# Patient Record
Sex: MALE | Race: OTHER | HISPANIC OR LATINO | Employment: FULL TIME | URBAN - METROPOLITAN AREA
[De-identification: names, ages, dates, MRNs, and addresses within clinical notes are randomized per-mention and may not be internally consistent; named-entity substitution may affect disease eponyms.]

---

## 2017-02-01 ENCOUNTER — TELEPHONE (OUTPATIENT)
Dept: SLEEP MEDICINE | Facility: CLINIC | Age: 52
End: 2017-02-01

## 2017-02-10 ENCOUNTER — OFFICE VISIT (OUTPATIENT)
Dept: SLEEP MEDICINE | Facility: CLINIC | Age: 52
End: 2017-02-10
Payer: COMMERCIAL

## 2017-02-10 VITALS
HEIGHT: 72 IN | DIASTOLIC BLOOD PRESSURE: 67 MMHG | HEART RATE: 82 BPM | BODY MASS INDEX: 23.41 KG/M2 | SYSTOLIC BLOOD PRESSURE: 124 MMHG | WEIGHT: 172.81 LBS

## 2017-02-10 DIAGNOSIS — G47.30 SLEEP APNEA, UNSPECIFIED TYPE: Primary | ICD-10-CM

## 2017-02-10 DIAGNOSIS — M26.19 RETROGNATHIA: ICD-10-CM

## 2017-02-10 PROCEDURE — 99204 OFFICE O/P NEW MOD 45 MIN: CPT | Mod: S$GLB,,, | Performed by: PSYCHIATRY & NEUROLOGY

## 2017-02-10 PROCEDURE — 99999 PR PBB SHADOW E&M-EST. PATIENT-LVL III: CPT | Mod: PBBFAC,,, | Performed by: PSYCHIATRY & NEUROLOGY

## 2017-02-10 NOTE — PATIENT INSTRUCTIONS
Patient is candidate for home sleep testing.  Set up testing with [facility].  Scheduled by [facility]    1. Eliza[facility] will contact you to schedule your sleep study [phone] (ext [phone])  2. Sleep Lab is located in the [facility], take [place] elevator to 7th floor; You will see sign for [facility]

## 2017-02-10 NOTE — LETTER
February 10, 2017      Dinesh Meza MD  [address] [facility] [address]           [facility]  [address]  [facility] [address]  Phone: [phone]          Patient: Arpan Gamboa   MR Number: [id number]   YOB: 1965   Date of Visit: 2/10/2017       Dear Dr. Dinesh Meza:    Thank you for referring Arpan Gamboa to me for evaluation. Attached you will find relevant portions of my assessment and plan of care.    If you have questions, please do not hesitate to call me. I look forward to following Arpan Gamboa along with you.    Sincerely,    Jesse Agudelo MD    Enclosure  CC:  No Recipients    If you would like to receive this communication electronically, please contact [email or web address] or [phone] to request more information on [facility] access.    For providers and/or their staff who would like to refer a patient to [facility], please contact us through our one-stop-shop provider referral line, [facility], at [phone].    If you feel you have received this communication in error or would no longer like to receive these types of communications, please e-mail [email or web address]

## 2017-02-10 NOTE — MR AVS SNAPSHOT
[facility]  [address]  [facility] [address]  Phone: [phone]                  Arpan Gamboa   2/10/2017 1:00 PM   Office Visit    Description:  Male : 1965   Provider:  Jesse Agudelo MD   Department:  [facility]           Reason for Visit     Sleep Apnea           Diagnoses this Visit        Comments    Sleep apnea, unspecified type    -  Primary     Retrognathia                To Do List           Goals (5 Years of Data)     None      Follow-Up and Disposition     Return after sleep study.      [facility]     [facility] On Call Nurse Care Line -  Assistance  Registered nurses in the [facility] Call Center provide clinical advisement, health education, appointment booking, and other advisory services.  Call for this free service at [phone].             Medications           Message regarding Medications     Verify the changes and/or additions to your medication regime listed below are the same as discussed with your clinician today.  If any of these changes or additions are incorrect, please notify your healthcare provider.             Verify that the below list of medications is an accurate representation of the medications you are currently taking.  If none reported, the list may be blank. If incorrect, please contact your healthcare provider. Carry this list with you in case of emergency.                Clinical Reference Information           Your Vitals Were     BP Pulse Height Weight BMI    124/67 82 6' (1.829 m) 78.4 kg (172 lb 13.5 oz) 23.44 kg/m2      Blood Pressure          Most Recent Value    BP  124/67      Allergies as of 2/10/2017     Dog Hair Standardized Allergenic Extract      Immunizations Administered on Date of Encounter - 2/10/2017     None      Orders Placed During Today's Visit     Future Labs/Procedures Expected by Expires    Home Sleep Studies  As directed 2/10/2018      [facility]     Activating your  [facility] account is as easy as 1-2-3!     1) Visit [email or web address], select Sign Up Now, enter this activation code and your date of birth, then select Next.  [email or web address]  Expires: 3/27/2017  1:32 PM      2) Create a username and password to use when you visit [facility] in the future and select a security question in case you lose your password and select Next.    3) Enter your e-mail address and click Sign Up!    Additional Information  If you have questions, please e-mail [email or web address] or call [phone] to talk to our [facility] staff. Remember, [facility] is NOT to be used for urgent needs. For medical emergencies, dial 911.         Instructions    Patient is candidate for home sleep testing.  Set up testing with [facility].  Scheduled by [facility]    1. Fawad will contact you to schedule your sleep study [phone] (ext [phone])  2. Sleep Lab is located in the [facility], take [place] elevator to 7th floor; You will see sign for [facility]         Language Assistance Services     ATTENTION: Language assistance services are available, free of charge. Please call [phone].      ATENCIÓN: Si habla español, tiene a connell disposición servicios gratuitos de asistencia lingüística. Llame al [phone].     CHÚ Ý: N?u b?n nói Ti?ng Vi?t, có các d?ch v? h? tr? ngôn ng? mi?n phí dành cho b?n. G?i s? [phone].         [facility] complies with applicable Federal civil rights laws and does not discriminate on the basis of race, color, national origin, age, disability, or sex.

## 2017-02-10 NOTE — PROGRESS NOTES
This 51 y.o. male patient presents for the evaluation of possible obstructive sleep apnea.    Snoring way too much, getting worse  Gasping for air in sleep, at times, self-perceived  Apnea witnessed by girlfriend  Excessive daytime sleepiness/ Waking up tired    ESS = 9    Mother had prior sleep study, and sleep apnea (presumably), then had corrective surgery    Frequent stuffy nose and respiratory allergies    Kulkarni-Parkinson White syndrome    SLEEP ROUTINE:   Bed partner: girlfriend   Time to bed: 11 pm - [place]   Sleep onset latency: no time   Disruptions or awakenings: 1-2 times   Wakeup time: 6-7 am   Perceived sleep quality: poor to fair   Daytime naps: none      Past Medical History   Diagnosis Date    Asthma        History reviewed. No pertinent past surgical history.    Family History   Problem Relation Age of Onset    Lupus Sister        Social History   Substance Use Topics    Smoking status: Never Smoker    Smokeless tobacco: None    Alcohol use None       ALLERGIES: Reviewed in EPIC    CURRENT MEDICATIONS: Reviewed in EPIC    REVIEW OF SYSTEMS:  Sleep related symptoms as per HPI;    Denies weight gain;    Denies sinus problems;    Sometimes dyspnea;    Sometimes palpitations;    Sometimes acid reflux;    Denies polyuria;    Sometimes headaches;    Denies mood disturbance;    Denies anemia;    Otherwise, a balance of systems reviewed is negative.    PHYSICAL EXAM:  Visit Vitals    /67    Pulse 82    Ht 6' (1.829 m)    Wt 78.4 kg (172 lb 13.5 oz)    BMI 23.44 kg/m2     GENERAL: Well groomed; Normally developed;  HEENT: Conjunctivae are non-erythematous; Pupils equal, round, and reactive to light;    Nose is symmetrical; Nasal mucosa is pink and moist; Septum is midline;    Turbinates are normal; Nasal airflow is normal;    Posterior pharynx is pink; Posterior palate is normal; Modified Mallampati: I   Uvula is normal; Tongue is normal; Tonsils +1;    Dentition is fair; No TMJ tenderness;     Mild-mod retrognathia   Jaw opening and protrusion without click and without discomfort.  NECK: Supple. No thyromegaly. No palpable nodes. Neck circumference in inches is 15.6  SKIN: On face and neck: No abrasions, no rashes, no lesions.     No subcutaneous nodules are palpable.  RESPIRATORY: Chest is clear to auscultation.     Normal chest expansion and non-labored breathing at rest.  CARDIOVASCULAR: Normal S1, S2.  No murmurs, gallops or rubs.   No carotid bruits bilaterally.  EXTREMITIES: No clubbing. No cyanosis. Edema is absent.   NEURO: Oriented to time, place and person.    Normal attention span and concentration.  Station normal. Gait normal.  PSYCH: Affect is full. Mood is normal.      ASSESSMENT:    1. Sleep Apnea, unspecified. The patient symptomatically has snoring and witnessed apnea and non-refreshing sleep with exam findings of a crowded oral airway with medical co-morbidities of moderate retrognathia. This warrants further investigation for possible obstructive sleep apnea.    2. Retrognathia - likely a component of increasing sleep apnea risk. May be a good oral appliance candidate, if obstructive sleep apnea is determined.         PLAN:    Diagnostic: Home sleep study. The nature of this procedure and its indication was discussed with the patient.    Education: During our discussion today, we talked about the etiology of obstructive sleep apnea as well as the potential ramifications of untreated sleep apnea, which could include daytime sleepiness, hypertension, heart disease and/or stroke.   Precautions: The patient was advised to abstain from driving should they feel sleepy or drowsy.    Follow up: I will see the patient back after the sleep study has been completed. At this time, we can review the data and discuss potential treatment options. MD/NP

## 2017-03-06 ENCOUNTER — TELEPHONE (OUTPATIENT)
Dept: SLEEP MEDICINE | Facility: OTHER | Age: 52
End: 2017-03-06

## 2017-03-10 ENCOUNTER — TELEPHONE (OUTPATIENT)
Dept: SLEEP MEDICINE | Facility: OTHER | Age: 52
End: 2017-03-10

## 2017-04-05 ENCOUNTER — TELEPHONE (OUTPATIENT)
Dept: SLEEP MEDICINE | Facility: OTHER | Age: 52
End: 2017-04-05

## 2017-04-06 ENCOUNTER — HOSPITAL ENCOUNTER (OUTPATIENT)
Dept: SLEEP MEDICINE | Facility: OTHER | Age: 52
Discharge: HOME | End: 2017-04-06
Attending: PSYCHIATRY & NEUROLOGY
Payer: COMMERCIAL

## 2017-04-06 DIAGNOSIS — G47.33 OSA (OBSTRUCTIVE SLEEP APNEA): ICD-10-CM

## 2017-04-06 DIAGNOSIS — G47.30 SLEEP APNEA, UNSPECIFIED TYPE: ICD-10-CM

## 2017-04-06 PROCEDURE — 95800 SLP STDY UNATTENDED: CPT

## 2017-04-06 PROCEDURE — 95800 SLP STDY UNATTENDED: CPT | Mod: 26,,, | Performed by: PSYCHIATRY & NEUROLOGY

## 2017-04-16 ENCOUNTER — TELEPHONE (OUTPATIENT)
Dept: SLEEP MEDICINE | Facility: OTHER | Age: 52
End: 2017-04-16

## 2017-04-21 ENCOUNTER — OFFICE VISIT (OUTPATIENT)
Dept: SLEEP MEDICINE | Facility: CLINIC | Age: 52
End: 2017-04-21
Payer: COMMERCIAL

## 2017-04-21 VITALS
BODY MASS INDEX: 23.11 KG/M2 | DIASTOLIC BLOOD PRESSURE: 71 MMHG | WEIGHT: 170.63 LBS | HEART RATE: 75 BPM | SYSTOLIC BLOOD PRESSURE: 119 MMHG | HEIGHT: 72 IN

## 2017-04-21 DIAGNOSIS — G47.33 OBSTRUCTIVE SLEEP APNEA: Primary | ICD-10-CM

## 2017-04-21 PROCEDURE — 1160F RVW MEDS BY RX/DR IN RCRD: CPT | Mod: S$GLB,,, | Performed by: NURSE PRACTITIONER

## 2017-04-21 PROCEDURE — 99214 OFFICE O/P EST MOD 30 MIN: CPT | Mod: S$GLB,,, | Performed by: NURSE PRACTITIONER

## 2017-04-21 PROCEDURE — 99999 PR PBB SHADOW E&M-EST. PATIENT-LVL III: CPT | Mod: PBBFAC,,, | Performed by: NURSE PRACTITIONER

## 2017-04-21 RX ORDER — TOBRAMYCIN AND DEXAMETHASONE 3; 1 MG/ML; MG/ML
SUSPENSION/ DROPS OPHTHALMIC
Refills: 1 | COMMUNITY
Start: 2017-03-20 | End: 2017-06-26

## 2017-04-21 NOTE — MR AVS SNAPSHOT
[facility]  [address]  [facility] [address]  Phone: [phone]                  Arpan Gamboa   2017 3:40 PM   Office Visit    Description:  Male : 1965   Provider:  Donna Major NP   Department:  [facility]           Reason for Visit     Snoring     Sleep Apnea           Diagnoses this Visit        Comments    Obstructive sleep apnea    -  Primary            To Do List           Goals (5 Years of Data)     None      [facility]     [facility] On Call Nurse Care Line -  Assistance  Unless otherwise directed by your provider, please contact [facility], our nurse care line that is available for  assistance.     Registered nurses in the [facility] On Call Center provide: appointment scheduling, clinical advisement, health education, and other advisory services.  Call: [phone] (toll free)               Medications           Message regarding Medications     Verify the changes and/or additions to your medication regime listed below are the same as discussed with your clinician today.  If any of these changes or additions are incorrect, please notify your healthcare provider.             Verify that the below list of medications is an accurate representation of the medications you are currently taking.  If none reported, the list may be blank. If incorrect, please contact your healthcare provider. Carry this list with you in case of emergency.           Current Medications     tobramycin-dexamethasone 0.3-0.1% (TOBRADEX) 0.3-0.1 % DrpS INSTILL 1 DROP INTO BOTH EYES 4 TIMES A DAY           Clinical Reference Information           Your Vitals Were     BP Pulse Height Weight BMI    119/71 75 6' (1.829 m) 77.4 kg (170 lb 10.2 oz) 23.14 kg/m2      Blood Pressure          Most Recent Value    BP  119/71      Allergies as of 2017     Dog Hair Standardized Allergenic Extract      Immunizations Administered on Date of Encounter -  4/21/2017     None      [facility] Sign-Up     Activating your [facility] account is as easy as 1-2-3!     1) Visit [email or web address], select Sign Up Now, enter this activation code and your date of birth, then select Next.  [email or web address]  Expires: 6/5/2017  4:23 PM      2) Create a username and password to use when you visit [facility] in the future and select a security question in case you lose your password and select Next.    3) Enter your e-mail address and click Sign Up!    Additional Information  If you have questions, please e-mail [email or web address] or call [phone] to talk to our [facility] staff. Remember, [facility] is NOT to be used for urgent needs. For medical emergencies, dial 911.         Instructions      Obstructive Sleep Apnea  Obstructive sleep apnea is a condition that causes your air passages to become narrowed or blocked during sleep. As a result, breathing stops for short periods. Your body wakes up enough for breathing to begin again, though you don't remember it. The cycle of stopped breathing and brief awakenings can repeat dozens of times a night. This prevents the body from getting to the deeper stages of sleep that are needed for good rest.  Signs of sleep apnea include loud snoring, noisy breathing, and gasping sounds during sleep. Daytime symptoms include waking up tired after a full night's sleep, waking up with headaches, feeling very sleepy or falling asleep during the day, and having problems with memory or concentration.  Risk factors for sleep apnea include:  · Being overweight  · Being a man, or a woman in menopause  · Smoking  · Using alcohol or sedating medications or herbs  · Having enlarged structures in the nose or throat  Home care  Lifestyle changes that can help treat snoring and sleep apnea include the following:  · If you are overweight, lose weight. Talk to your healthcare provider about a weight-loss plan for you.  · Avoid alcohol for 3 to 4 hours before  bedtime. Avoid sedating medications. Ask your healthcare provider about the medications you take.  · If you smoke, talk to your healthcare provider about ways to quit.  · Sleep on your side. This can help prevent gravity from pulling relaxed throat tissues into your breathing passages.  · If you have allergies or sinus problems that block your nose, ask your healthcare provider for help.  Follow up  Follow up with your healthcare provider as advised. A diagnosis of sleep apnea is made with a sleep study. Your healthcare provider can tell you more about this test.  When to seek medical care  Sleep apnea can make you more likely to have certain health problems. These include high blood pressure, heart attack, stroke, and sexual dysfunction. If you have sleep apnea, talk to your healthcare provider about the best treatments for you.  Date Last Reviewed: 5/3/2015  © 6327-1753 [employer]. [address] [place] [address]. All rights reserved. This information is not intended as a substitute for professional medical care. Always follow your healthcare professional's instructions.        Continuous Positive Air Pressure (CPAP)  Continuous positive air pressure (CPAP) uses gentle air pressure to hold the airway open. CPAP is often the most effective treatment for sleep apnea and severe snoring. It works very well for many people. But keep in mind that it can take several adjustments before the setup is right for you.    How CPAP works  The CPAPmachine  is a small portable pump beside the bed. The pump sends air through a hose, which is held over your nose and mouth by a mask. Mild air pressure is gently pushed through your airway. The air pressure nudges sagging tissues aside. This widens the airway so you can breathe better. CPAP may be combined with other kinds of therapy for sleep apnea.     A mask over the nose gently directs air into the throat to keep the airway open.   Types of air pressure  treatments  There are different types of CPAP. Your doctor or CPAP technician will help you decide which type is best for you:  · Basic CPAP keeps the pressure constant all night long.  · A bilevel device (BiPAP) provides more pressure when you breathe in and less when you breathe out. A BiPAP machine also may be set to provide automatic breaths to maintain breathing if you stop breathing while sleeping.  · An autoCPAP device automatically adjusts pressure throughout the night and in response to changes such as body position, sleep stage, and snoring.  Date Last Reviewed: 8/10/2015  © 6082-0247 [employer]. [address] [place] [address]. All rights reserved. This information is not intended as a substitute for professional medical care. Always follow your healthcare professional's instructions.        Mouthpieces for Sleep Apnea  For simple snoring or mild to moderate apnea, a special mouthpiece may help. A dental specialist works with your healthcare provider to build and fit a mouthpiece just for you. A follow-up sleep study checks how well the device is working for you. Mouthpieces are also called oral appliances.     Moving the jaw and tongue forward with a mouthpiece can open the airway to reduce sleep apnea.   Moving the jaw forward  Most mouthpieces move the jaw and tongue forward. That keeps the tongue from blocking the airway. Mouthpieces can work well, but they are not for everyone. Work with your healthcare provider to get a mouthpiece that fits just right for you. Oral appliances are usually custom made for you by a dental professional. And, avoid over-the-counter mouthpieces -- they often do not work.  Tips  To have the most success with your mouthpiece, keep these tips in mind:  · It will take some time to get used to wearing a mouthpiece. At first it may feel uncomfortable or make your mouth water. If these problems last, tell your healthcare provider.  · Expect several  rounds of adjustments to get the mouthpiece to fit and work just right for you.  · Mouthpieces dont cure the problems that cause snoring or sleep apnea. So you need to use your mouthpiece all night, every night.  · Follow your healthcare providers instructions for keeping the mouthpiece clean.  · When youre not wearing your mouthpiece, store it in its case.  Date Last Reviewed: 8/9/2015 © 2000-2016 [employer]. [address] [place] [address], [place] [address]. All rights reserved. This information is not intended as a substitute for professional medical care. Always follow your healthcare professional's instructions.             Language Assistance Services     ATTENTION: Language assistance services are available, free of charge. Please call [phone].      ATENCIÓN: Si paul eladio, tiene a connell disposición servicios gratuitos de asistencia lingüística. Llame al [phone].     CHÚ Ý: N?u b?n nói Ti?ng Vi?t, có các d?ch v? h? tr? ngôn ng? mi?n phí dành cho b?n. G?i s? [phone].         [facility] complies with applicable Federal civil rights laws and does not discriminate on the basis of race, color, national origin, age, disability, or sex.

## 2017-04-21 NOTE — PROGRESS NOTES
This 51 y.o. male patient returns today for the mgt of obstructive sleep apnea. Last seen by MD Agudelo 2/10/17, this is my initial visit with him.     Since then, he has undergone a home sleep study which was reviewed with him today. +snoring, + air gasping +witnessed apneas, + waking up tired/EDS, + disrupted sleep. Less loud snoring on his side. Tries to stay on his side. Nocturia now 3-4x! ESS=12. Occasional sinus congestion (dust allergy). Prn use flonase ns and allegra.     HST 4/7/17: AHI 35 (supine ahi 52/side ahi 7/low sat 86%        HISTORY:  2/10/17:  Snoring way too much, getting worse  Gasping for air in sleep, at times, self-perceived  Apnea witnessed by girlfriend  Excessive daytime sleepiness/ Waking up tired    ESS = 9    Mother had prior sleep study, and sleep apnea (presumably), then had corrective surgery    Frequent stuffy nose and respiratory allergies    Kulkarni-Parkinson White syndrome    SLEEP ROUTINE:   Bed partner: girlfriend   Time to bed: 11 pm - [place]   Sleep onset latency: no time   Disruptions or awakenings: 1-2 times   Wakeup time: 6-7 am   Perceived sleep quality: poor to fair   Daytime naps: none    REVIEW OF SYSTEMS:  Sleep related symptoms as per HPI; 2# loss, otherwise a balance review of 10-systems is negative.     PHYSICAL EXAM:  /71  Pulse 75  Ht 6' (1.829 m)  Wt 77.4 kg (170 lb 10.2 oz)  BMI 23.14 kg/m2  GENERAL: Well groomed; W/D, W/N   Mild-mod retrognathia     ASSESSMENT:    1. AMANDEEP, severe, supine worse. He has medical co-morbidities of moderate retrognathia. This warrants treatment for obstructive sleep apnea.           PLAN:  We discussed potential treatment options, which could include weight loss (10-15%), body positioning (showed position devices, side ahi mild and less snoring), continuous positive airway pressure (CPAP-defintive), mandibular advancement splint by dentist if intolerable to trial of PAP therapy, or referral for surgical consideration. Discussed  etiology of AMANDEEP and potential ramifications of untreated AMANDEEP, including heart disease, hypertension, cognitive difficulties, stroke, and diabetes. AHI>15 associated with increased cardiovascular risk.     The patient should avoid sleeping supine; the non-supine RDI is 5.7 times less severe than the supine RDI.    Do not drive sleepy.     He will consider options and notify me next week

## 2017-04-21 NOTE — PATIENT INSTRUCTIONS
Obstructive Sleep Apnea  Obstructive sleep apnea is a condition that causes your air passages to become narrowed or blocked during sleep. As a result, breathing stops for short periods. Your body wakes up enough for breathing to begin again, though you don't remember it. The cycle of stopped breathing and brief awakenings can repeat dozens of times a night. This prevents the body from getting to the deeper stages of sleep that are needed for good rest.  Signs of sleep apnea include loud snoring, noisy breathing, and gasping sounds during sleep. Daytime symptoms include waking up tired after a full night's sleep, waking up with headaches, feeling very sleepy or falling asleep during the day, and having problems with memory or concentration.  Risk factors for sleep apnea include:  · Being overweight  · Being a man, or a woman in menopause  · Smoking  · Using alcohol or sedating medications or herbs  · Having enlarged structures in the nose or throat  Home care  Lifestyle changes that can help treat snoring and sleep apnea include the following:  · If you are overweight, lose weight. Talk to your healthcare provider about a weight-loss plan for you.  · Avoid alcohol for 3 to 4 hours before bedtime. Avoid sedating medications. Ask your healthcare provider about the medications you take.  · If you smoke, talk to your healthcare provider about ways to quit.  · Sleep on your side. This can help prevent gravity from pulling relaxed throat tissues into your breathing passages.  · If you have allergies or sinus problems that block your nose, ask your healthcare provider for help.  Follow up  Follow up with your healthcare provider as advised. A diagnosis of sleep apnea is made with a sleep study. Your healthcare provider can tell you more about this test.  When to seek medical care  Sleep apnea can make you more likely to have certain health problems. These include high blood pressure, heart attack, stroke, and sexual  dysfunction. If you have sleep apnea, talk to your healthcare provider about the best treatments for you.  Date Last Reviewed: 5/3/2015  © 3600-4838 20:[employer]. [address] [place] [address]. All rights reserved. This information is not intended as a substitute for professional medical care. Always follow your healthcare professional's instructions.        Continuous Positive Air Pressure (CPAP)  Continuous positive air pressure (CPAP) uses gentle air pressure to hold the airway open. CPAP is often the most effective treatment for sleep apnea and severe snoring. It works very well for many people. But keep in mind that it can take several adjustments before the setup is right for you.    How CPAP works  The CPAPmachine  is a small portable pump beside the bed. The pump sends air through a hose, which is held over your nose and mouth by a mask. Mild air pressure is gently pushed through your airway. The air pressure nudges sagging tissues aside. This widens the airway so you can breathe better. CPAP may be combined with other kinds of therapy for sleep apnea.     A mask over the nose gently directs air into the throat to keep the airway open.   Types of air pressure treatments  There are different types of CPAP. Your doctor or CPAP technician will help you decide which type is best for you:  · Basic CPAP keeps the pressure constant all night long.  · A bilevel device (BiPAP) provides more pressure when you breathe in and less when you breathe out. A BiPAP machine also may be set to provide automatic breaths to maintain breathing if you stop breathing while sleeping.  · An autoCPAP device automatically adjusts pressure throughout the night and in response to changes such as body position, sleep stage, and snoring.  Date Last Reviewed: 8/10/2015  © 7359-1837 20:20 [employer]. [address] [place] [address]. All rights reserved. This information is not intended as a  substitute for professional medical care. Always follow your healthcare professional's instructions.        Mouthpieces for Sleep Apnea  For simple snoring or mild to moderate apnea, a special mouthpiece may help. A dental specialist works with your healthcare provider to build and fit a mouthpiece just for you. A follow-up sleep study checks how well the device is working for you. Mouthpieces are also called oral appliances.     Moving the jaw and tongue forward with a mouthpiece can open the airway to reduce sleep apnea.   Moving the jaw forward  Most mouthpieces move the jaw and tongue forward. That keeps the tongue from blocking the airway. Mouthpieces can work well, but they are not for everyone. Work with your healthcare provider to get a mouthpiece that fits just right for you. Oral appliances are usually custom made for you by a dental professional. And, avoid over-the-counter mouthpieces -- they often do not work.  Tips  To have the most success with your mouthpiece, keep these tips in mind:  · It will take some time to get used to wearing a mouthpiece. At first it may feel uncomfortable or make your mouth water. If these problems last, tell your healthcare provider.  · Expect several rounds of adjustments to get the mouthpiece to fit and work just right for you.  · Mouthpieces dont cure the problems that cause snoring or sleep apnea. So you need to use your mouthpiece all night, every night.  · Follow your healthcare providers instructions for keeping the mouthpiece clean.  · When youre not wearing your mouthpiece, store it in its case.  Date Last Reviewed: 8/9/2015  © 7682-1282 [employer]. [address] [place] [address], [place] [address]. All rights reserved. This information is not intended as a substitute for professional medical care. Always follow your healthcare professional's instructions.

## 2017-05-08 PROBLEM — I45.6 WOLFF-PARKINSON-WHITE SYNDROME: Status: ACTIVE | Noted: 2017-05-08

## 2017-05-17 ENCOUNTER — OFFICE VISIT (OUTPATIENT)
Dept: SLEEP MEDICINE | Facility: CLINIC | Age: 52
End: 2017-05-17
Payer: COMMERCIAL

## 2017-05-17 VITALS
SYSTOLIC BLOOD PRESSURE: 120 MMHG | BODY MASS INDEX: 22.99 KG/M2 | HEART RATE: 80 BPM | HEIGHT: 72 IN | WEIGHT: 169.75 LBS | DIASTOLIC BLOOD PRESSURE: 64 MMHG

## 2017-05-17 DIAGNOSIS — G47.33 OBSTRUCTIVE SLEEP APNEA: Primary | ICD-10-CM

## 2017-05-17 PROCEDURE — 99499 UNLISTED E&M SERVICE: CPT | Mod: S$GLB,,, | Performed by: NURSE PRACTITIONER

## 2017-05-17 PROCEDURE — 99999 PR PBB SHADOW E&M-EST. PATIENT-LVL III: CPT | Mod: PBBFAC,,, | Performed by: NURSE PRACTITIONER

## 2017-06-26 ENCOUNTER — OFFICE VISIT (OUTPATIENT)
Dept: UROLOGY | Facility: CLINIC | Age: 52
End: 2017-06-26
Payer: COMMERCIAL

## 2017-06-26 VITALS
BODY MASS INDEX: 23.57 KG/M2 | SYSTOLIC BLOOD PRESSURE: 127 MMHG | HEIGHT: 72 IN | WEIGHT: 174 LBS | HEART RATE: 96 BPM | DIASTOLIC BLOOD PRESSURE: 71 MMHG

## 2017-06-26 DIAGNOSIS — R35.1 NOCTURIA: ICD-10-CM

## 2017-06-26 DIAGNOSIS — N40.1 BENIGN NON-NODULAR PROSTATIC HYPERPLASIA WITH LOWER URINARY TRACT SYMPTOMS: Primary | ICD-10-CM

## 2017-06-26 DIAGNOSIS — N20.0 KIDNEY STONE: ICD-10-CM

## 2017-06-26 PROCEDURE — 99204 OFFICE O/P NEW MOD 45 MIN: CPT | Mod: S$GLB,,, | Performed by: UROLOGY

## 2017-06-26 PROCEDURE — 99999 PR PBB SHADOW E&M-EST. PATIENT-LVL III: CPT | Mod: PBBFAC,,, | Performed by: UROLOGY

## 2017-06-26 NOTE — PROGRESS NOTES
Subjective:      Patient ID: Arpan Gamboa is a 51 y.o. male.    Chief Complaint: No chief complaint on file.    Patient is a 51 y.o. male who is new to our clinic and referred by their PCP, Dr. Mckee for evaluation of BPH.     HPI  Benign Prostatic Hypertrophy  Patient complains of lower urinary tract symptoms. He reports frequency, nocturia four times a night and urgency. He denies incomplete emptying, intermittency, straining and weak stream. Patient states symptoms are of moderate severity. Onset of symptoms was 4 months ago and was sudden in onset. His AUA Symptom Score is, 18/3 manifested as irritative symptoms including frequency, urgency, nocturia. He has no personal history but a questionable family history of prostate cancer with his father who is undergoing biopsies currently. He reports a history of kidney stones. He denies flank pain, gross hematuria and recurrent UTI.    He denies any prostate medications.   He drinks 1-2 cups/day. Goes to bed at midnight.  Eats dinner at 8-9pm.  Drinks water or a glass of wine with dinner.  Does not drink fluid between dinner and bedtime.   He has AMANDEEP and uses CPAP--started 1 month ago, compliant 7days/week.      Review of Systems   All other systems reviewed and negative except pertinent positives noted in HPI.    Objective:     Physical Exam   Nursing note and vitals reviewed.  Constitutional: He is oriented to person, place, and time. Vital signs are normal. He appears well-developed and well-nourished. He is cooperative.   HENT:   Head: Normocephalic.   Neck: No tracheal deviation present.   Cardiovascular: Normal rate and intact distal pulses.    Pulmonary/Chest: Effort normal. No accessory muscle usage. No tachypnea. No respiratory distress.   Abdominal: Soft. Normal appearance. He exhibits no distension, no fluid wave, no ascites and no mass. There is no tenderness. There is no rebound and no CVA tenderness. No hernia. Hernia confirmed negative in the  right inguinal area and confirmed negative in the left inguinal area.   Liver/spleen non-palpable.   Genitourinary: Prostate normal, testes normal and penis normal. Rectal exam shows no external hemorrhoid, no mass, no tenderness and anal tone normal. Prostate is not tender. Right testis shows no mass and no tenderness. Right testis is descended. Left testis shows no mass and no tenderness. Left testis is descended. Uncircumcised.   Genitourinary Comments: Scrotum showed no rashes or lesions.  Anus/perineum without lesion.  Epididymis showed no masses or tenderness. No meatal stenosis, meatus in normal location. No penile discharge/plaques. Prostate smooth, no nodules, 30 grams.  Seminal vesicles non-palpable.  Normal sphincter tone.        Musculoskeletal: Normal range of motion.   Lymphadenopathy: No inguinal adenopathy noted on the right or left side.        Right: No inguinal adenopathy present.        Left: No inguinal adenopathy present.   Neurological: He is alert and oriented to person, place, and time. He has normal strength.   Skin: No bruising and no rash noted.     Psychiatric: He has a normal mood and affect. His speech is normal and behavior is normal. Thought content normal.        Assessment:     1. Benign non-nodular prostatic hyperplasia with lower urinary tract symptoms    2. Nocturia    3. Kidney stone      Plan:     1. BPH:  -A post void residual bladder scan was performed immediately after voiding. The patient's PVR was noted to be 47mL.  -Avoid bladder irritants including but not limited to caffeine, alcohol, smoking, spicy foods, acidic foods, tomato-based products, citrus, artificial sweeteners, chocolate, coffee or tea.  -prostate meds: none; offered flomax.  We will hold off for right now.       2. Nocturia:  -Limit fluids 2 hours before bedtime.  Elevated legs 2 hours before bedtime.  No caffeine, cokes, teas after 3 pm in the afternoon.    3. Kidney stones:  -CT RSS to assess stone  burden.

## 2017-06-30 ENCOUNTER — HOSPITAL ENCOUNTER (OUTPATIENT)
Dept: RADIOLOGY | Facility: HOSPITAL | Age: 52
Discharge: HOME | End: 2017-06-30
Attending: UROLOGY
Payer: COMMERCIAL

## 2017-06-30 DIAGNOSIS — N20.0 KIDNEY STONE: ICD-10-CM

## 2017-06-30 PROCEDURE — 74176 CT ABD & PELVIS W/O CONTRAST: CPT | Mod: 26,,, | Performed by: RADIOLOGY

## 2017-06-30 PROCEDURE — 74176 CT ABD & PELVIS W/O CONTRAST: CPT | Mod: TC

## 2017-07-11 ENCOUNTER — TELEPHONE (OUTPATIENT)
Dept: CARDIOLOGY | Facility: CLINIC | Age: 52
End: 2017-07-11

## 2017-07-24 ENCOUNTER — OFFICE VISIT (OUTPATIENT)
Dept: SLEEP MEDICINE | Facility: CLINIC | Age: 52
End: 2017-07-24
Payer: COMMERCIAL

## 2017-07-24 VITALS
BODY MASS INDEX: 23.71 KG/M2 | DIASTOLIC BLOOD PRESSURE: 70 MMHG | WEIGHT: 175.06 LBS | HEART RATE: 64 BPM | SYSTOLIC BLOOD PRESSURE: 140 MMHG | HEIGHT: 72 IN

## 2017-07-24 DIAGNOSIS — G47.33 OBSTRUCTIVE SLEEP APNEA: Primary | ICD-10-CM

## 2017-07-24 PROCEDURE — 99999 PR PBB SHADOW E&M-EST. PATIENT-LVL III: CPT | Mod: PBBFAC,,, | Performed by: NURSE PRACTITIONER

## 2017-07-24 PROCEDURE — 99213 OFFICE O/P EST LOW 20 MIN: CPT | Mod: S$GLB,,, | Performed by: NURSE PRACTITIONER

## 2017-07-24 NOTE — PROGRESS NOTES
This 51 y.o. male patient returns today for the mgt of obstructive sleep apnea. Last seen 4/2/17    He has since been setup with apap (6/10/17) 4-16cm. Hated it initially but since acclimated, he now likes it and can use it. Traveling with equipment. Feels more mentally sharp/clearer thinking. Snoring minimal. Much less disrupted sleep, now once. ESS=9. Denies nasal drying despite low heat setting (adds water though).Denies pressure intolernace. Using chin strap with dreamwear mask. Occasional mask leaks.     Interrogation- AHI 7.6-9.6, 0% periodic. avg use 5.1h/n. 23/30d>4h. 86-95 (1d)% leak (was blowing in hotel room recently while not in use).           HISTORY:  2/10/17:  Snoring way too much, getting worse  Gasping for air in sleep, at times, self-perceived  Apnea witnessed by girlfriend  Excessive daytime sleepiness/ Waking up tired    ESS = 9    Mother had prior sleep study, and sleep apnea (presumably), then had corrective surgery    Frequent stuffy nose and respiratory allergies    Kulkarni-Parkinson White syndrome    SLEEP ROUTINE:   Bed partner: girlfriend   Time to bed: 11 pm - [place]   Sleep onset latency: no time   Disruptions or awakenings: 1-2 times   Wakeup time: 6-7 am   Perceived sleep quality: poor to fair   Daytime naps: none    4/21/17:   Since then, he has undergone a home sleep study which was reviewed with him today. +snoring, + air gasping +witnessed apneas, + waking up tired/EDS, + disrupted sleep. Less loud snoring on his side. Tries to stay on his side. Nocturia now 3-4x! ESS=12. Occasional sinus congestion (dust allergy). Prn use flonase ns and allegra.     HST 4/7/17: AHI 35 (supine ahi 52/side ahi 7/low sat 86%      REVIEW OF SYSTEMS:  Sleep related symptoms as per HPI;5# gain, otherwise a balance review of 10-systems is negative.     PHYSICAL EXAM:  BP (!) 140/70   Pulse 64   Ht 6' (1.829 m)   Wt 79.4 kg (175 lb 0.7 oz)   BMI 23.74 kg/m²   GENERAL: Well groomed; W/D, W/N   Mild-mod  retrognathia     ASSESSMENT:    1. AMANDEEP, severe, supine worse. 7/24/17: Adherent with apap, benefiting from therapy. Residual elevated mild ahi could be from leak or pressure adjustment.Symptoms improved  He has medical co-morbidities of moderate retrognathia. This warrants treatment for obstructive sleep apnea.           PLAN:  Continue APAP, ajdust today 7-14cm. Continue to improve mask on time/sleep time. Avoid mask leaks    Discussed etiology of AMANDEEP and potential ramifications of untreated AMANDEEP, including heart disease, hypertension, cognitive difficulties, stroke, and diabetes. AHI>15 associated with increased cardiovascular risk.     RTC 5 wk adherence

## 2017-07-31 DIAGNOSIS — I45.6 WPW (WOLFF-PARKINSON-WHITE SYNDROME): Primary | ICD-10-CM

## 2017-08-29 ENCOUNTER — TELEPHONE (OUTPATIENT)
Dept: ELECTROPHYSIOLOGY | Facility: CLINIC | Age: 52
End: 2017-08-29

## 2017-08-29 NOTE — TELEPHONE ENCOUNTER
----- Message from Ata Hines MD sent at 8/25/2017  4:56 PM CDT -----  Tx Conor  He canceled his appointment on 8/2 for some scheduling conflict  We will reach out to him for rescheduling  Best  Ata  ----- Message -----  From: Conor Garcia MD  Sent: 5/8/2017   2:41 PM  To: MD Dr. Ata Wei,    Advised to see you for WPW.    Please see my enclosed office note.    Conor Garcia M.D.

## 2017-08-29 NOTE — TELEPHONE ENCOUNTER
Pt scheduled for consult apt on 9/28. Called pt to offer sooner apt, no answer. Left voicemail requesting pt call back to clinic if he prefers to be seen sooner by Dr Valerio.

## 2018-05-04 ENCOUNTER — OFFICE VISIT (OUTPATIENT)
Dept: CARDIOLOGY | Facility: CLINIC | Age: 53
End: 2018-05-04
Payer: COMMERCIAL

## 2018-05-04 ENCOUNTER — CLINICAL SUPPORT (OUTPATIENT)
Dept: CARDIOLOGY | Facility: CLINIC | Age: 53
End: 2018-05-04
Payer: COMMERCIAL

## 2018-05-04 VITALS
DIASTOLIC BLOOD PRESSURE: 70 MMHG | WEIGHT: 182 LBS | HEIGHT: 72 IN | BODY MASS INDEX: 24.65 KG/M2 | SYSTOLIC BLOOD PRESSURE: 124 MMHG | HEART RATE: 72 BPM

## 2018-05-04 DIAGNOSIS — I45.6 WPW (WOLFF-PARKINSON-WHITE SYNDROME): ICD-10-CM

## 2018-05-04 DIAGNOSIS — R00.2 PALPITATIONS: ICD-10-CM

## 2018-05-04 DIAGNOSIS — I47.10 PSVT (PAROXYSMAL SUPRAVENTRICULAR TACHYCARDIA): ICD-10-CM

## 2018-05-04 DIAGNOSIS — G47.33 OBSTRUCTIVE SLEEP APNEA SYNDROME: ICD-10-CM

## 2018-05-04 DIAGNOSIS — I45.6 WOLFF-PARKINSON-WHITE SYNDROME: Primary | ICD-10-CM

## 2018-05-04 PROCEDURE — 93000 ELECTROCARDIOGRAM COMPLETE: CPT | Mod: S$GLB,,, | Performed by: INTERNAL MEDICINE

## 2018-05-04 PROCEDURE — 99999 PR PBB SHADOW E&M-EST. PATIENT-LVL II: CPT | Mod: PBBFAC,,, | Performed by: INTERNAL MEDICINE

## 2018-05-04 PROCEDURE — 99245 OFF/OP CONSLTJ NEW/EST HI 55: CPT | Mod: S$GLB,,, | Performed by: INTERNAL MEDICINE

## 2018-05-04 RX ORDER — ALBUTEROL SULFATE 90 UG/1
2 AEROSOL, METERED RESPIRATORY (INHALATION) DAILY PRN
COMMUNITY
Start: 2017-12-12 | End: 2019-09-08 | Stop reason: SDUPTHER

## 2018-05-04 RX ORDER — OXYBUTYNIN CHLORIDE 10 MG/1
10 TABLET, EXTENDED RELEASE ORAL DAILY
COMMUNITY
End: 2020-06-09

## 2018-05-04 NOTE — PROGRESS NOTES
Subjective:   Patient ID:  Arpan Gamboa is a 52 y.o. male     Chief complaint:Irregular Heart Beat (WPW since teenager)      HPI  Background:  Arpan Gamboa is a 52 y.o. male from [facility].   Initially seen by dr Garcia.   Referred to me last year. Canceled several appts in [place].   He was initially diagnosed with WPW at age 17 when in [facility]. This was after an SVT episode that started while playing basket ball and it lasted for 2 hrs. He was given propranolol (once a day) but had frequent tachycardias.He stopped the med after a couple years. He then resumed exercising w/o issues.    Between 1987 and 2006 he essentially did not have any spells. Since then however, he has been having essentially yearly spellls of tachycardias.   In 2007 at age 42, he was admitted to a hospital in [place] with a 4 hour long tachycardia and then had a heart rate of 230-240 bpm.   More recently, he has been having a bit more issues. He has had 2 attacks the past year, the last of which was last month.  On average, each episode lasts 10-20 minutes. Nothing aggravates the symptoms.   Episodes occur always as he is going to bed.   He has never been offered an ablation. He was told about vagal maneuvers and he uses that effectively at times (cold bucket)  Pertinent negatives include no anxiety, chest fullness, chest pain, coughing, diaphoresis, dizziness, fever, irregular heartbeat, malaise/fatigue, nausea, near-syncope, numbness, shortness of breath, syncope, vomiting or weakness. He has tried beta blockers for the symptoms.   He does not exercise as much due to knee injury (during a half marathon).  Travels a lot. Works for [employer].   Has sleep apnea and uses CPAP-- loves it.   I have reviewed the actual image of the ECG tracing obtained today and it shows NSR with possible pre-excitation and . If there is true preexcitation, the delta wave axis may be c/w an AS BT.          Current Outpatient Prescriptions    Medication Sig    oxybutynin (DITROPAN XL) 10 MG 24 hr tablet Take 10 mg by mouth once daily.     No current facility-administered medications for this visit.      Review of Systems   Constitution: Negative for decreased appetite, weakness, malaise/fatigue, weight gain and weight loss.   Eyes: Negative for blurred vision.   Cardiovascular: Positive for irregular heartbeat and palpitations. Negative for chest pain, claudication, cyanosis, dyspnea on exertion, leg swelling, near-syncope and orthopnea.   Respiratory: Positive for snoring. Negative for cough, shortness of breath, sleep disturbances due to breathing and wheezing.    Endocrine: Negative for heat intolerance.   Hematologic/Lymphatic: Does not bruise/bleed easily.   Musculoskeletal: Negative for muscle weakness and myalgias.   Gastrointestinal: Negative for melena, nausea and vomiting.   Genitourinary: Positive for frequency. Negative for nocturia.   Neurological: Negative for excessive daytime sleepiness, dizziness, headaches and light-headedness.   Psychiatric/Behavioral: Negative for depression, memory loss and substance abuse. The patient does not have insomnia and is not nervous/anxious.        Objective:   Physical Exam   Constitutional: He is oriented to person, place, and time. He appears well-developed and well-nourished.   HENT:   Head: Normocephalic and atraumatic.   Right Ear: External ear normal.   Left Ear: External ear normal.   Eyes: Conjunctivae are normal. Pupils are equal, round, and reactive to light. Right eye exhibits no discharge. Left eye exhibits no discharge. Right conjunctiva is not injected. Left conjunctiva has no hemorrhage.   Neck: Neck supple. No JVD present. No thyromegaly present.   Cardiovascular: Normal rate, regular rhythm, normal heart sounds and intact distal pulses.  PMI is not displaced.  Exam reveals no gallop, no friction rub, no midsystolic click and no opening snap.    No murmur heard.  Pulses:       Carotid  pulses are 2+ on the right side, and 2+ on the left side.       Radial pulses are 2+ on the right side, and 2+ on the left side.        Dorsalis pedis pulses are 2+ on the right side, and 2+ on the left side.        Posterior tibial pulses are 2+ on the right side, and 2+ on the left side.   Pulmonary/Chest: Effort normal and breath sounds normal. No respiratory distress. He has no wheezes. He has no rales. He exhibits no tenderness.   Abdominal: Soft. Normal appearance. He exhibits no pulsatile liver. There is no hepatomegaly. There is no tenderness. There is no rebound and no guarding.   Musculoskeletal: Normal range of motion. He exhibits no edema or tenderness.        Right knee: He exhibits no swelling.        Left knee: He exhibits no swelling.        Right ankle: He exhibits no swelling.        Left ankle: He exhibits no swelling.        Right lower leg: He exhibits no swelling.        Left lower leg: He exhibits no swelling.        Right foot: There is no swelling.        Left foot: There is no swelling.   Neurological: He is alert and oriented to person, place, and time. He has normal strength and normal reflexes. No cranial nerve deficit. Coordination normal.   Skin: Skin is warm, dry and intact. No rash noted. He is not diaphoretic. No cyanosis.   Psychiatric: He has a normal mood and affect. His behavior is normal.   Nursing note and vitals reviewed.    /70 (BP Location: Right arm, Patient Position: Sitting)   Pulse 72   Ht 6' (1.829 m)   Wt 82.6 kg (182 lb)   BMI 24.68 kg/m²      Assessment:      1. Sebastián-Parkinson-White syndrome    2. Palpitations    3. PSVT (paroxysmal supraventricular tachycardia)    4. Obstructive sleep apnea syndrome        Plan:    Discussed options -- he would like to proceed with RFA  But in a few weeks or so -- he'll discuss with his wife first  I have discussed the procedure in detail with the patient. I described its benefits and risks. I reviewed alternative  therapies and discussed their potential value. The patient was given ample opportunity to express concerns and ask questions and I provided appropriate responses and  answers to such.The patient understands and agrees to proceed.  Consent form was signed today by patient and myself and appropriately witnessed.     No orders of the defined types were placed in this encounter.    Follow-up in about 6 months (around 11/4/2018), or post op.  There are no discontinued medications.  New Prescriptions    No medications on file     Modified Medications    No medications on file

## 2018-06-13 ENCOUNTER — OFFICE VISIT (OUTPATIENT)
Dept: SPORTS MEDICINE | Facility: CLINIC | Age: 53
End: 2018-06-13
Payer: COMMERCIAL

## 2018-06-13 ENCOUNTER — HOSPITAL ENCOUNTER (OUTPATIENT)
Dept: RADIOLOGY | Facility: HOSPITAL | Age: 53
Discharge: HOME | End: 2018-06-13
Attending: FAMILY MEDICINE
Payer: COMMERCIAL

## 2018-06-13 VITALS — HEIGHT: 72 IN | WEIGHT: 182 LBS | TEMPERATURE: 98 F | BODY MASS INDEX: 24.65 KG/M2

## 2018-06-13 DIAGNOSIS — M25.561 RIGHT KNEE PAIN, UNSPECIFIED CHRONICITY: Primary | ICD-10-CM

## 2018-06-13 DIAGNOSIS — M25.561 RIGHT KNEE PAIN, UNSPECIFIED CHRONICITY: ICD-10-CM

## 2018-06-13 PROCEDURE — 99999 PR PBB SHADOW E&M-EST. PATIENT-LVL III: CPT | Mod: PBBFAC,,, | Performed by: FAMILY MEDICINE

## 2018-06-13 PROCEDURE — 73564 X-RAY EXAM KNEE 4 OR MORE: CPT | Mod: 26,RT,, | Performed by: INTERNAL MEDICINE

## 2018-06-13 PROCEDURE — 99214 OFFICE O/P EST MOD 30 MIN: CPT | Mod: 25,S$GLB,, | Performed by: FAMILY MEDICINE

## 2018-06-13 PROCEDURE — 3008F BODY MASS INDEX DOCD: CPT | Mod: CPTII,S$GLB,, | Performed by: FAMILY MEDICINE

## 2018-06-13 PROCEDURE — 73564 X-RAY EXAM KNEE 4 OR MORE: CPT | Mod: TC,50,FY,PO

## 2018-06-13 PROCEDURE — 73564 X-RAY EXAM KNEE 4 OR MORE: CPT | Mod: 26,LT,, | Performed by: INTERNAL MEDICINE

## 2018-06-13 PROCEDURE — 20611 DRAIN/INJ JOINT/BURSA W/US: CPT | Mod: RT,S$GLB,, | Performed by: FAMILY MEDICINE

## 2018-06-13 RX ORDER — TRIAMCINOLONE ACETONIDE 40 MG/ML
40 INJECTION, SUSPENSION INTRA-ARTICULAR; INTRAMUSCULAR
Status: DISCONTINUED | OUTPATIENT
Start: 2018-06-13 | End: 2018-06-13 | Stop reason: HOSPADM

## 2018-06-13 RX ADMIN — TRIAMCINOLONE ACETONIDE 40 MG: 40 INJECTION, SUSPENSION INTRA-ARTICULAR; INTRAMUSCULAR at 02:06

## 2018-06-13 NOTE — PROGRESS NOTES
Arpan Gamboa, a 52 y.o. male, presents today for evaluation of his RIGHT knee.      History of Present Illness (HPI)  Location: anterior knee  Onset: insidious, chronic  Palliative:    Relative rest   Oral analgesics  Provocative:    Prolonged ambulation  Prior: none  Progression: plateau discomfort  Quality: sharp pain  Radiation: none  Severity: per nursing documentation  Timing: intermittent w/ use  Trauma:     Review of Systems (ROS)  A 10+ review of systems was performed with pertinent positives and negatives noted above in the history of present illness. Other systems were negative unless otherwise specified.    Physical Examination (PE)  General:  The patient is alert and oriented x 3. Mood is pleasant. Observation of ears, eyes and nose reveal no gross abnormalities. HEENT: NCAT, sclera anicteric.   Lungs: Respirations are equal and unlabored.  Gait is coordinated. Patient can toe walk and heel walk without difficulty.    KNEE EXAMINATION    Observation/Inspection  Gait:   Nonantalgic   Alignment:  Neutral   Scars:   None   Muscle atrophy: Mild  Effusion:  None   Warmth:  None   Discoloration:   none     Tenderness / Crepitus (T / C):         T / C      T / C  Patella   - / -   Lateral joint line   - / -     Peripatellar medial  -  Medial joint line    + / -  Peripatellar lateral -  Medial plica   - / -  Patellar tendon -   Popliteal fossa   - / -  Quad tendon   -   Gastrocnemius   -  Prepatellar Bursa - / -   Quadricep   -  Tibial tubercle  -  Thigh/hamstring  -  Pes anserine/HS -  Fibula    -  ITB   - / -  Tibia     -  Tib/fib joint  - / -  LCL    -    MFC   - / -   MCL: Proximal  -    LFC   - / -   Distal    -          ROM: (* = pain)  PASSIVE   ACTIVE    Left :   5 / 0 / 145   5 / 0 / 145     Right :    5 / 0 / 145   5 / 0 / 145    Patellofemoral examination:  See above noted areas of tenderness.   Patella position    Subluxation / dislocation: Centered        Sup. / Inf;   Normal   Crepitus  (PF):    Absent   Patellar Mobility:       Medial-lateral:   Normal    Superior-inferior:  Normal    Inferior tilt   Normal    Patellar tendon:  Normal   Lateral tilt:    Normal   J-sign:     None   Patellofemoral grind:   No pain     Meniscal Signs:     Pain on terminal extension:  +  Pain on terminal flexion:  +  Jeannies maneuver:  +*  Squat     NT    Ligament Examination:  ACL / Lachman:  WNL  PCL-Post.  drawer: normal 0 to 2mm  MCL- Valgus:  normal 0 to 2mm  LCL- Varus:    normal 0 to 2mm  Pivot shift:  guarding   Dial Test:   difference c/w other side   At 30° flexion: normal (< 5°)    At 90° flexion: normal (< 5°)   Reverse Pivot Shift:   normal (Equal)     Strength: (* = with pain) Painful Side  Quadriceps   5/5  Hamstrin/5    Extremity Neuro-vascular Examination:   Sensation:  Grossly intact to light touch all dermatomal regions.   Motor Function:  Fully intact motor function at hip, knee, foot and ankle    DTRs;  quadriceps and  achilles 2+.  No clonus and downgoing Babinski.    Vascular status:  DP and PT pulses 2+, brisk capillary refill, symmetric.     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, right   W/ medial meniscal inflammatory changes    No evidence of neurologic pathology  No evidence of vascular pathology    Imaging studies reviewed:   X-ray knee, bilateral 18.07    Plan:    We discussed the importance of appropriate diet, weight, and regular exercise including quadriceps strengthening     We discussed options including:  #1 watchful waiting  #2 re start physical therapy aimed at:   Core stability   RoM knee   Strengthening quadriceps   Gait training   #3 injection therapy:   CSI iaknee     Right, effective good%, repeat     Left,    VSI iaknee    Right,     Left,    Orthobiologics   #4 consultation      The patient chooses #2 and #3 csi iaknee right    Pain management required: handout given  Bracing required:   Physical  therapy required: fPT, @ [facility], ruben start as above  Activity (e.g. sports, work) restrictions: as tolerated   school/vocation: active lifestyle (walks 15min to work everyday), would like to return to recreational running    Works for [employer]    Follow up in 12 weeks  A/e fPT, a/e csi iaknee right  Ineffective-->consider vsi iaknee right  Should symptoms worsen or fail to resolve, consider:  Revisiting the above options

## 2018-06-13 NOTE — PROCEDURES
"Large Joint Aspiration/Injection  Date/Time: 6/13/2018 2:55 PM  Performed by: MARIUM HINTON  Authorized by: MARIUM HINTON     Consent Done?:  Yes (Verbal)  Indications:  Pain  Procedure site marked: Yes    Timeout: Prior to procedure the correct patient, procedure, and site was verified      Location:  Knee  Site:  R knee  Prep: Patient was prepped and draped in usual sterile fashion    Ultrasonic Guidance for needle placement: Yes  Images are saved and documented.  Needle size:  18 G  Approach:  Lateral  Medications:  40 mg triamcinolone acetonide 40 mg/mL  Patient tolerance:  Patient tolerated the procedure well with no immediate complications    Additional Comments: Description of ultrasound utilization for needle guidance:   Ultrasound guidance used for needle localization.  Images saved and stored for documentation.  The knee joint was visualized.  Dynamic visualization of the 20g x 1.5"  needle was continuous throughout the procedure.      "

## 2018-06-27 ENCOUNTER — CLINICAL SUPPORT (OUTPATIENT)
Dept: REHABILITATION | Facility: HOSPITAL | Age: 53
End: 2018-06-27
Attending: FAMILY MEDICINE
Payer: COMMERCIAL

## 2018-06-27 DIAGNOSIS — M25.561 RIGHT MEDIAL KNEE PAIN: ICD-10-CM

## 2018-06-27 PROCEDURE — 97110 THERAPEUTIC EXERCISES: CPT

## 2018-06-27 PROCEDURE — 97161 PT EVAL LOW COMPLEX 20 MIN: CPT

## 2018-06-27 NOTE — PLAN OF CARE
[facility]  Physical Therapy Initial Evaluation    Name: Arpan Gamboa  Clinic Number: [id number]    Therapy Diagnosis: No diagnosis found.  Physician: Win Rahman, *    Physician Orders: PT Eval and Treat   Medical Diagnosis: Right knee pain  Evaluation Date: 6/27/2018  Authorization Period Expiration: 12/31/2018  Plan of Care Certification Period: 10/17/2018  Visit # / Visits authorized: 1/ 20  Precautions: None  Date of Surgery: NA    Time In: 720a  Time Out: 820a  Total Billable Time: 60 minutes    Subjective     Date of onset: Insidious  History of current condition - Arpan reports: right knee pain that began after running a half marathon about 1.5 years ago.  He states that he felt like his knee locked a couple of days later.  He states that he had MRI and PT that helped, but he still has limitation in ROM, strength, and pain.  He states that he has not been able to run since then.  He states that he is trying to walk for exercise, 3-4 miles, 2x/week.  He states that he walks regularly for activity.  He states that he has pain in medial and posterior knee that is mostly constant.  He states that he feels difficulty with going up and down stairs, squatting, sit to stand.     Past Medical History:   Diagnosis Date    Asthma     Sleep apnea     Tachycardia     WPW     Arpan Gamboa  has a past surgical history that includes hernia repair at 3 yrs old.    Arpan has a current medication list which includes the following prescription(s): albuterol and oxybutynin.    Review of patient's allergies indicates:   Allergen Reactions    Dog hair standardized allergenic extract         Prior Therapy: For previous onset of same condition in 2016  Social History: Active lifestyle, runner, lives with their family  Occupation: [employer]  Prior Level of Function: No limitation with running, walking, squatting  Current Level of Function: Weakness and pain with stairs,  "squatting, jogging    Pain:  Current 1/10, worst 4/10, best 1/10   Location: right knee   Description: Aching, Dull and Tight    Pts goals: Return to running    Objective     Observation: Patient arrives with no obvious gait deviation or assistive device    Functional tests:    SL Squat: Excessive femoral lateral rotation right     Range of Motion:   Knee Right (AROM/PROM) Left (AROM/PROM)   Flexion 142 degrees 144 degrees   Extension -2 degrees 4 degrees     Lower Extremity Strength:  Right LE  Left LE    Quadriceps: 4+/5 Quadriceps: 5/5   Hamstrings: 4+/5 Hamstrings: 5/5   Hip flexion (supine): 4+/5 Hip flexion (supine): 5/5   Hip extension:  4/5 Hip extension: 4+/5   Glute Med: 3/5 Glute Med:  4/5     Special Tests:   Right Left   Valgus Stress Test - -   Varus Stress test - -   Lachman's test - -   Posterior Lachman - -   Davian's Test - -   Thessaly's Test - -     Patellar Mobility: WNL     Palpation: Tenderness to medial tibiofemoral joint line    CMS Impairment/Limitation/Restriction for FOTO Knee Survey    Therapist reviewed FOTO scores for Arpan Gamboa on 6/27/2018.   FOTO documents entered into [employer] - see Media section.    Limitation Score: 43%  Category: Mobility    Current : CK = at least 40% but < 60% impaired, limited or restricted  Goal: CI = at least 1% but < 20% impaired, limited or restricted  Discharge:          TREATMENT     Treatment Time In: 745a  Treatment Time Out: 815a  Total Treatment time separate from Evaluation time:30    Arpan received therapeutic exercises to develop strength, endurance and ROM for 30 minutes including:  Education in diagnosis and plan of care  Bridge - 2 x 10, 5", green  HS stretch - 5 x 10"  SLR - 2 x 10  Standing hip abduction - 2 x 10, orange    Arpan received the following manual therapy techniques: for 0 minutes    Home Exercises and Patient Education Provided    Education provided re: therapeutic diagnosis and plan of care    Written Home Exercises " Provided: Yes  Exercises were reviewed and Arpan was able to demonstrate them prior to the end of the session.   Pt received a written copy of exercises to perform at home. Arpan demonstrated good  understanding of the education provided.     See EMR under patient instructions for exercises given.         Assessment     Arapn is a 52 y.o. male referred to outpatient Physical Therapy with a medical diagnosis of right knee pain that began after running a half marathon about 2 years ago. Pt presents with objective deficits in right knee extension AROM in stance and non-weightbearing, right quadriceps and hamstrings strength, right hip abduction strength that limits functional ability to squat, go up and down stairs, jog.    Pt prognosis is Good.   Pt will benefit from skilled outpatient Physical Therapy to address the deficits stated above and in the chart below, provide pt/family education, and to maximize pt's level of independence.     Plan of care discussed with patient: Yes  Pt's spiritual, cultural and educational needs considered and patient is agreeable to the plan of care and goals as stated below:     Anticipated Barriers for therapy: None    Medical Necessity is demonstrated by the following  History  Co-morbidities and personal factors that may impact the plan of care Co-morbidities:   sleep apnea    Personal Factors:   None     low   Examination  Body Structures and Functions, activity limitations and participation restrictions that may impact the plan of care Body Regions:   lower extremities    Body Systems:    ROM  strength  gait    Participation Restrictions:   None    Activity limitations:   Learning and applying knowledge  no deficits    General Tasks and Commands  no deficits    Communication  no deficits    Mobility  no deficits    Self care  no deficits    Domestic Life  no deficits    Interactions/Relationships  no deficits    Life Areas  no deficits    Community and Social Life  no deficits          low   Clinical Presentation stable and uncomplicated low   Decision Making/ Complexity Score: low     Goals:  Short Term Goals: 4 weeks   1. The patient will demonstrate 0 degrees of affected knee extension AROM to increase ease with symmetry of ambulation.  2. The patient will demonstrate ability to ascend steps reciprocally without pain.  3. The patient will demonstrate 5/5 strength of affected quadriceps and hamstrings to increase ease with going up and down stairs, squatting.    Long Term Goals: 16 weeks   1. The patient will demonstrate 4 degrees of affected knee extension AROM to increase ease with return to jogging and symmetry of ambulation.  2. The patient will demonstrate ability to squat with no discomfort.  3. The patient will demonstrate 4+/5 strength of affected gluteus medius to increase ease with squatting, stairs.    Plan   Certification Period/Plan of care expiration: 6/27/2018 to 10/17/2018.    Outpatient Physical Therapy 2 times weekly for 16 weeks to include the following interventions: manual therapy as needed, therapeutic exercises to address functional deficits, modalities prn, wound care prn, treatment by a skilled PTA at times.    Jeovanny Esquivel, PT

## 2018-06-27 NOTE — PATIENT INSTRUCTIONS
"BRIDGING ELASTIC BAND ABDUCTION  While lying on your back, place an elastic  band around your knees and pull your knees  apart. Hold this and then tighten your lower  abdominals, squeeze your buttocks and raise  your buttocks off the floor/bed as creating a  "Bridge" with your body.  Repeat 10 Times  Hold 10 Seconds  Complete 1 Set  Perform 1 Time(s) a Day  HAMSTRING STRETCH - SUPINE  While lying on the ground, hold the back of  your knee/thigh area and straighten your  knee until a stretch is felt along the back of  your leg.  Repeat 15 Times  Hold 1 Second  Complete 2 Sets  Perform 1 Time(s) a Day  LOOPED ELASTIC BAND HIP ABDUCTION  While standing with an elastic band looped  around your ankles, move the target leg out  to the side as shown.  Powered by [email or web address] Created By joanne clay Jun 27th, 2018 - Page 1 of 2  Repeat 15 Times  Hold 3 Seconds  Complete 2 Sets  Perform 1 Time(s) a Day  STRAIGHT LEG RAISE - SLR  While lying on your back, raise up your leg  with a straight knee. Keep the opposite knee  bent with the foot planted on the ground.  Powered  "

## 2018-06-29 ENCOUNTER — CLINICAL SUPPORT (OUTPATIENT)
Dept: REHABILITATION | Facility: HOSPITAL | Age: 53
End: 2018-06-29
Attending: FAMILY MEDICINE
Payer: COMMERCIAL

## 2018-06-29 DIAGNOSIS — M25.561 RIGHT MEDIAL KNEE PAIN: ICD-10-CM

## 2018-06-29 PROCEDURE — 97140 MANUAL THERAPY 1/> REGIONS: CPT

## 2018-06-29 PROCEDURE — 97110 THERAPEUTIC EXERCISES: CPT

## 2018-06-29 NOTE — PROGRESS NOTES
"                            Physical Therapy Daily Treatment Note     Name: Arpan Gamboa  Clinic Number: [id number]    Therapy Diagnosis:   Encounter Diagnosis   Name Primary?    Right medial knee pain      Physician: Win Rahman, *    Visit Date: 6/29/2018  Physician Orders: PT Eval and Treat   Medical Diagnosis: Right knee pain  Evaluation Date: 6/27/2018  Authorization Period Expiration: 12/31/2018  Plan of Care Certification Period: 10/17/2018  Precautions: None  Date of Surgery: NA  Visit # / Visits authorized: 2/20    Time In: 715a  Time Out: 810a  Total Billable Time: 55 minutes    Subjective      Pt reports: he was compliant with home exercise program given last session.   Response to previous treatment:some increased soreness of affected knee after HEP yesterday  Functional change: none yet    Pain: 2/10  Location: right knee      Objective     Arpan received therapeutic exercises to develop strength, ROM, flexibility and core stabilization for 40 minutes including:  Education in diagnosis and plan of care  SAQ - 2 x 10, 3"  SB bridge - 2 x 10, 3"  Step up with resistance to knee extension - 2 x 15, 6", green  Shuttle DLP - 2 x 15, 2 black  Calf stretch SL - 3 x 30"  Plyo toss - 2 x 20, airex, lunge stance  Wall sit - 2 x 45"    Arpan received the following manual therapy techniques for 10 minutes, including:  Patellar mobilizations in all directions Gr II-III  Medial hamstrings stretching    Home Exercises Provided and Patient Education Provided     Education provided:   Continue current HEP    Written Home Exercises Provided: Continue with current HEP  Exercises were reviewed and Arpan was able to demonstrate them prior to the end of the session.      Pt received a written copy of exercises to perform at home.   See EMR under patient instructions for exercises given.     Arpan demonstrated good  understanding of the education provided.     Assessment     The patient had some medial knee pain " with full active knee extension, lacking knee extension in gait  Arpan is progressing well towards his goals.   Pt prognosis is Excellent.     Pt will continue to benefit from skilled outpatient physical therapy to address the deficits listed in the problem list box on initial evaluation, provide pt/family education and to maximize pt's level of independence in the home and community environment.     Pt's spiritual, cultural and educational needs considered and pt agreeable to plan of care and goals.    Anticipated barriers to physical therapy: None    Goals:   Short Term Goals: 4 weeks   1. The patient will demonstrate 0 degrees of affected knee extension AROM to increase ease with symmetry of ambulation.  2. The patient will demonstrate ability to ascend steps reciprocally without pain.  3. The patient will demonstrate 5/5 strength of affected quadriceps and hamstrings to increase ease with going up and down stairs, squatting.     Long Term Goals: 16 weeks   1. The patient will demonstrate 4 degrees of affected knee extension AROM to increase ease with return to jogging and symmetry of ambulation.  2. The patient will demonstrate ability to squat with no discomfort.  3. The patient will demonstrate 4+/5 strength of affected gluteus medius to increase ease with squatting, stairs.    Plan     Continue to address lack of knee extension    Jeovanny Esquivel, PT

## 2018-07-05 ENCOUNTER — LAB VISIT (OUTPATIENT)
Dept: LAB | Facility: HOSPITAL | Age: 53
End: 2018-07-05
Attending: PODIATRIST
Payer: COMMERCIAL

## 2018-07-05 ENCOUNTER — OFFICE VISIT (OUTPATIENT)
Dept: PODIATRY | Facility: CLINIC | Age: 53
End: 2018-07-05
Payer: COMMERCIAL

## 2018-07-05 VITALS
WEIGHT: 182 LBS | HEIGHT: 72 IN | DIASTOLIC BLOOD PRESSURE: 80 MMHG | BODY MASS INDEX: 24.65 KG/M2 | HEART RATE: 83 BPM | SYSTOLIC BLOOD PRESSURE: 133 MMHG

## 2018-07-05 DIAGNOSIS — M79.675 PAIN DUE TO ONYCHOMYCOSIS OF TOENAILS OF BOTH FEET: ICD-10-CM

## 2018-07-05 DIAGNOSIS — M79.674 PAIN DUE TO ONYCHOMYCOSIS OF TOENAILS OF BOTH FEET: ICD-10-CM

## 2018-07-05 DIAGNOSIS — B35.1 PAIN DUE TO ONYCHOMYCOSIS OF TOENAILS OF BOTH FEET: ICD-10-CM

## 2018-07-05 DIAGNOSIS — B35.1 ONYCHOMYCOSIS DUE TO DERMATOPHYTE: Primary | ICD-10-CM

## 2018-07-05 DIAGNOSIS — B35.1 ONYCHOMYCOSIS DUE TO DERMATOPHYTE: ICD-10-CM

## 2018-07-05 LAB
ALBUMIN SERPL BCP-MCNC: 3.7 G/DL
ALP SERPL-CCNC: 135 U/L
ALT SERPL W/O P-5'-P-CCNC: 34 U/L
AST SERPL-CCNC: 23 U/L
BILIRUB DIRECT SERPL-MCNC: 0.1 MG/DL
BILIRUB SERPL-MCNC: 0.4 MG/DL
PROT SERPL-MCNC: 7.4 G/DL

## 2018-07-05 PROCEDURE — 3008F BODY MASS INDEX DOCD: CPT | Mod: CPTII,S$GLB,, | Performed by: PODIATRIST

## 2018-07-05 PROCEDURE — 99999 PR PBB SHADOW E&M-EST. PATIENT-LVL III: CPT | Mod: PBBFAC,,, | Performed by: PODIATRIST

## 2018-07-05 PROCEDURE — 99203 OFFICE O/P NEW LOW 30 MIN: CPT | Mod: S$GLB,,, | Performed by: PODIATRIST

## 2018-07-05 PROCEDURE — 80076 HEPATIC FUNCTION PANEL: CPT

## 2018-07-05 PROCEDURE — 36415 COLL VENOUS BLD VENIPUNCTURE: CPT

## 2018-07-05 RX ORDER — TERBINAFINE HYDROCHLORIDE 250 MG/1
250 TABLET ORAL DAILY
Qty: 45 TABLET | Refills: 0 | Status: SHIPPED | OUTPATIENT
Start: 2018-07-05 | End: 2018-08-19

## 2018-07-05 RX ORDER — TERBINAFINE HYDROCHLORIDE 250 MG/1
250 TABLET ORAL DAILY
Qty: 45 TABLET | Refills: 0 | Status: SHIPPED | OUTPATIENT
Start: 2018-07-05 | End: 2018-07-05 | Stop reason: CLARIF

## 2018-07-05 NOTE — PROGRESS NOTES
Subjective:      Patient ID: Arpan Gamboa is a 52 y.o. male.    Chief Complaint: Nail Problem (deformity)    Arpan is a 52 y.o. male who presents to the clinic complaining of thick and discolored toenails on both feet. Arpan is inquiring about treatment options. Has tried topical prescription medication long time ago but did not complete course. States the nails are becoming thicker and worse in appearance and starting to bother him in shoes. He would like to try further treatment to help improve this.     Vitals:    07/05/18 0750   BP: 133/80   Pulse: 83   Weight: 82.6 kg (182 lb)   Height: 6' (1.829 m)   PainSc: 0-No pain     Chief Complaint   Patient presents with    Nail Problem     deformity     Past Medical History:   Diagnosis Date    Asthma     Sleep apnea     Tachycardia     WPW       Past Surgical History:   Procedure Laterality Date    hernia repair at 3 yrs old         Family History   Problem Relation Age of Onset    Hypertension Mother     Prostatitis Father     COPD Father     Lupus Sister        Social History     Social History    Marital status:      Spouse name: N/A    Number of children: N/A    Years of education: N/A     Social History Main Topics    Smoking status: Never Smoker    Smokeless tobacco: Never Used    Alcohol use 0.0 oz/week      Comment: socially    Drug use: Unknown    Sexual activity: Not Asked     Other Topics Concern    None     Social History Narrative    None       Current Outpatient Prescriptions   Medication Sig Dispense Refill    albuterol (VENTOLIN HFA) 90 mcg/actuation inhaler Inhale 2 puffs into the lungs daily as needed.      oxybutynin (DITROPAN XL) 10 MG 24 hr tablet Take 10 mg by mouth once daily.      terbinafine HCl (LAMISIL) 250 mg tablet Take 1 tablet (250 mg total) by mouth once daily. 45 tablet 0     No current facility-administered medications for this visit.        Review of patient's allergies indicates:   Allergen Reactions     Dog hair standardized allergenic extract            Review of Systems   Constitution: Negative for chills and fever.   Cardiovascular: Negative for chest pain, claudication and leg swelling.   Respiratory: Negative for cough and shortness of breath.    Skin: Positive for dry skin and nail changes.   Musculoskeletal:        Toenail pain   Gastrointestinal: Negative for nausea and vomiting.   Neurological: Negative for numbness and paresthesias.   Psychiatric/Behavioral: Negative for altered mental status.           Objective:      Physical Exam   Constitutional: He is oriented to person, place, and time. He appears well-developed and well-nourished.   HENT:   Head: Normocephalic.   Cardiovascular: Intact distal pulses.    Pulses:       Dorsalis pedis pulses are 2+ on the right side, and 2+ on the left side.        Posterior tibial pulses are 2+ on the right side, and 2+ on the left side.   CRT < 3 sec to tips of toes.     Pulmonary/Chest: No respiratory distress.   Musculoskeletal:   Mild pain with palpation of b/l hallux and 5th toenails. Otherwise pedal joints with normal and complete ROM without symptoms.   Neurological: He is alert and oriented to person, place, and time. He has normal strength. No sensory deficit.   Light touch, proprioception, and sharp/dull sensation are all intact bilaterally.     Skin: Skin is warm, dry and intact. No bruising, no ecchymosis, no lesion and no rash noted. No erythema.   No open lesions, lacerations or wounds noted. Nails are thickened, elongated, discolored yellow/brown with subungual debris and brittleness to R 1,5 and L 1,5. Remaining toenails normotrophic. Interdigital spaces clean, dry and intact b/l. No erythema noted to b/l foot.     Psychiatric: He has a normal mood and affect. His behavior is normal. Judgment and thought content normal.   Vitals reviewed.            Assessment:       Encounter Diagnoses   Name Primary?    Onychomycosis due to dermatophyte Yes     Pain due to onychomycosis of toenails of both feet          Plan:       Arpan was seen today for nail problem.    Diagnoses and all orders for this visit:    Onychomycosis due to dermatophyte  -     Hepatic function panel; Future  -     Hepatic function panel; Future    Pain due to onychomycosis of toenails of both feet    Other orders  -     Discontinue: terbinafine HCl (LAMISIL) 250 mg tablet; Take 1 tablet (250 mg total) by mouth once daily.  -     terbinafine HCl (LAMISIL) 250 mg tablet; Take 1 tablet (250 mg total) by mouth once daily.      I counseled the patient on his conditions, their implications and medical management.    Discussed treatment options for fungal toenails including topical home remedies, topical OTC and Rx medications as well as oral Rx medications. All pros and cons discussed of each treatment. Patient leaning towards Vicks vaporub topically and Oral Lamisil daily for 3 months.      I advised him on keeping nails neatly trimmed, removing all sharp and loose edges. Filing the nail as necessary to prevent damage to nail plate and prevent unnecessary pulling of toenail. Also advised to avoid tight fitting shoes to prevent pressure related issues.     We discussed using Lamisil for onychomycosis. This drug offers a fairly high but not universal cure rate. A 12 week treatment course is recommended. The patient is aware that rare cases of liver injury have been reported; and agrees to come in for liver function tests at 6 weeks of treatment. The symptoms of liver disease have been discussed; call if such occurs. In addition, some insurance plans do not cover the expense of this drug for treating a cosmetic condition, and the patient understands they may have to pay for the medication. Other side effects, such as headaches and rashes, have also been discussed.    D/c any alcohol and Tylenol while taking oral Lamisil.     LFT today. Rx Lamisil 250mg once dialy for 45 days if lab normal. Follow up  LFT in 6 weeks after which the second half of treatment will be sent to pharmacy.     RTC 4 months for reassessment, sooner PRN

## 2018-07-06 ENCOUNTER — CLINICAL SUPPORT (OUTPATIENT)
Dept: REHABILITATION | Facility: HOSPITAL | Age: 53
End: 2018-07-06
Attending: FAMILY MEDICINE
Payer: COMMERCIAL

## 2018-07-06 DIAGNOSIS — M25.561 RIGHT MEDIAL KNEE PAIN: ICD-10-CM

## 2018-07-06 PROCEDURE — 97110 THERAPEUTIC EXERCISES: CPT

## 2018-07-06 PROCEDURE — 97140 MANUAL THERAPY 1/> REGIONS: CPT

## 2018-07-06 NOTE — PROGRESS NOTES
"                            Physical Therapy Daily Treatment Note     Name: Arpan Gamboa  Clinic Number: [id number]    Therapy Diagnosis:   Encounter Diagnosis   Name Primary?    Right medial knee pain      Physician: Win Rahman, *    Visit Date: 7/6/2018  Physician Orders: PT Eval and Treat   Medical Diagnosis: Right knee pain  Evaluation Date: 6/27/2018  Authorization Period Expiration: 12/31/2018  Plan of Care Certification Period: 10/17/2018  Precautions: None  Date of Surgery: NA  Visit # / Visits authorized: 3/20    Time In: 905a  Time Out: 1010a  Total Billable Time: 65 minutes    Subjective      Pt reports: he was compliant with home exercise program given last session.   Response to previous treatment:feeling decreased pain with stairs  Functional change: none yet    Pain: 2/10  Location: right knee      Objective     Arpan received therapeutic exercises to develop strength, ROM, flexibility and core stabilization for 40 minutes including:  Education in diagnosis and plan of care  SAQ - 2 x 10, 3"  SB bridge - 2 x 10, 3"  HS stretch - 4 x 20"  Step up with resistance to knee extension - 2 x 15, 6", green  Shuttle DLP - 2 x 15, 2 black  Calf stretch SL - 3 x 30"  SL balance on airex - 3 x 30 tosses, 6# ball  Runners pose - 3 x 30"  Wall sit - 2 x 45"    Arpan received the following manual therapy techniques for 10 minutes, including:  Patellar mobilizations in all directions Gr II-III  Medial hamstrings stretching    Home Exercises Provided and Patient Education Provided     Education provided:   Continue current HEP    Written Home Exercises Provided: Continue with current HEP  Exercises were reviewed and Arpan was able to demonstrate them prior to the end of the session.      Pt received a written copy of exercises to perform at home.   See EMR under patient instructions for exercises given.     Arpan demonstrated good  understanding of the education provided.     Assessment     The patient " demonstrates increased ease with active knee extension in gait and decreased medial knee pain with passive knee extension    Arpan is progressing well towards his goals.   Pt prognosis is Excellent.     Pt will continue to benefit from skilled outpatient physical therapy to address the deficits listed in the problem list box on initial evaluation, provide pt/family education and to maximize pt's level of independence in the home and community environment.     Pt's spiritual, cultural and educational needs considered and pt agreeable to plan of care and goals.    Anticipated barriers to physical therapy: None    Goals:   Short Term Goals: 4 weeks   1. The patient will demonstrate 0 degrees of affected knee extension AROM to increase ease with symmetry of ambulation.  2. The patient will demonstrate ability to ascend steps reciprocally without pain.  3. The patient will demonstrate 5/5 strength of affected quadriceps and hamstrings to increase ease with going up and down stairs, squatting.     Long Term Goals: 16 weeks   1. The patient will demonstrate 4 degrees of affected knee extension AROM to increase ease with return to jogging and symmetry of ambulation.  2. The patient will demonstrate ability to squat with no discomfort.  3. The patient will demonstrate 4+/5 strength of affected gluteus medius to increase ease with squatting, stairs.    Plan     Continue to address lack of knee extension    Jeovanny Esquivel, PT

## 2018-07-09 ENCOUNTER — CLINICAL SUPPORT (OUTPATIENT)
Dept: REHABILITATION | Facility: HOSPITAL | Age: 53
End: 2018-07-09
Attending: FAMILY MEDICINE
Payer: COMMERCIAL

## 2018-07-09 DIAGNOSIS — M25.561 RIGHT MEDIAL KNEE PAIN: ICD-10-CM

## 2018-07-09 PROCEDURE — 97140 MANUAL THERAPY 1/> REGIONS: CPT

## 2018-07-09 PROCEDURE — 97110 THERAPEUTIC EXERCISES: CPT

## 2018-07-09 NOTE — PROGRESS NOTES
"                            Physical Therapy Daily Treatment Note     Name: Arpan Gamboa  Clinic Number: [id number]    Therapy Diagnosis:   Encounter Diagnosis   Name Primary?    Right medial knee pain      Physician: Win Rahman, *    Visit Date: 7/9/2018  Physician Orders: PT Eval and Treat   Medical Diagnosis: Right knee pain  Evaluation Date: 6/27/2018  Authorization Period Expiration: 12/31/2018  Plan of Care Certification Period: 10/17/2018  Precautions: None  Date of Surgery: NA  Visit # / Visits authorized: 4/20    Time In: 720a  Time Out:800a  Total Billable Time: 40 minutes    Subjective      Pt reports: he was compliant with home exercise program given last session.   Response to previous treatment: feels less intensity of sofya  Functional change: increased endurance for gait    Pain: 1/10  Location: right knee      Objective     Arpan received therapeutic exercises to develop strength, ROM, flexibility and core stabilization for 30 minutes including:  Education in diagnosis and plan of care  SAQ - 2 x 10, 3"  SB bridge - 2 x 10, 3"  HS stretch - 4 x 20"  Step up with resistance to knee extension - 2 x 15, 6", green  Shuttle DLP - 2 x 15, 2 black  Calf stretch SL - 3 x 30" - ND  SL balance on airex - 3 x 30 tosses, 6# ball - ND  Runners pose - 3 x 30" - ND  Wall sit - 2 x 45" - ND    Arpan received the following manual therapy techniques for 10 minutes, including:  Patellar mobilizations in all directions Gr II-III  Medial hamstrings stretching    Home Exercises Provided and Patient Education Provided     Education provided:   Continue current HEP    Written Home Exercises Provided: Continue with current HEP  Exercises were reviewed and Arpan was able to demonstrate them prior to the end of the session.      Pt received a written copy of exercises to perform at home.   See EMR under patient instructions for exercises given.     Arpan demonstrated good  understanding of the education provided. "     Assessment     The patient has improved tolerance for slr holding and quad control with stepping up    Arpan is progressing well towards his goals.   Pt prognosis is Excellent.     Pt will continue to benefit from skilled outpatient physical therapy to address the deficits listed in the problem list box on initial evaluation, provide pt/family education and to maximize pt's level of independence in the home and community environment.     Pt's spiritual, cultural and educational needs considered and pt agreeable to plan of care and goals.    Anticipated barriers to physical therapy: None    Goals:   Short Term Goals: 4 weeks   1. The patient will demonstrate 0 degrees of affected knee extension AROM to increase ease with symmetry of ambulation.  2. The patient will demonstrate ability to ascend steps reciprocally without pain.  3. The patient will demonstrate 5/5 strength of affected quadriceps and hamstrings to increase ease with going up and down stairs, squatting.     Long Term Goals: 16 weeks   1. The patient will demonstrate 4 degrees of affected knee extension AROM to increase ease with return to jogging and symmetry of ambulation.  2. The patient will demonstrate ability to squat with no discomfort.  3. The patient will demonstrate 4+/5 strength of affected gluteus medius to increase ease with squatting, stairs.    Plan     Progress to light impact if able to perform single leg squat    Jeovanny Esquivel, PT

## 2018-07-13 ENCOUNTER — CLINICAL SUPPORT (OUTPATIENT)
Dept: REHABILITATION | Facility: HOSPITAL | Age: 53
End: 2018-07-13
Attending: FAMILY MEDICINE
Payer: COMMERCIAL

## 2018-07-13 DIAGNOSIS — M25.561 RIGHT MEDIAL KNEE PAIN: ICD-10-CM

## 2018-07-13 PROCEDURE — 97110 THERAPEUTIC EXERCISES: CPT

## 2018-07-13 PROCEDURE — 97140 MANUAL THERAPY 1/> REGIONS: CPT

## 2018-07-13 NOTE — PROGRESS NOTES
"                            Physical Therapy Daily Treatment Note     Name: Arpan Gamboa  Clinic Number: [id number]    Therapy Diagnosis:   Encounter Diagnosis   Name Primary?    Right medial knee pain      Physician: Win Rahman, *    Visit Date: 7/13/2018  Physician Orders: PT Eval and Treat   Medical Diagnosis: Right knee pain  Evaluation Date: 6/27/2018  Authorization Period Expiration: 12/31/2018  Plan of Care Certification Period: 10/17/2018  Precautions: None  Date of Surgery: NA  Visit # / Visits authorized: 5/20    Time In: 210p  Time Out: 305p  Total Billable Time: 55 minutes    Subjective      Pt reports: he was compliant with home exercise program given last session.   Response to previous treatment: feels like he is getting better, less frequent pain  Functional change: increased endurance for gait    Pain: 1/10  Location: right knee      Objective     Arpan received therapeutic exercises to develop strength, ROM, flexibility and core stabilization for 40 minutes including:  Education in diagnosis and plan of care  SAQ - 2 x 10, 3"  SB bridge - 2 x 10, 3"  HS stretch - 4 x 20"  Step up with resistance to knee extension - 2 x 15, 6", green  Shuttle DLP - 2 x 15, 2 black  Calf stretch SL - 3 x 30"  SL balance on airex - 3 x 30 tosses, 6# ball - ND  Runners pose - 3 x 30"   Wall sit - 2 x 45" - ND    Arpan received the following manual therapy techniques for 10 minutes, including:  Patellar mobilizations in all directions Gr II-III  Medial hamstrings stretching    Home Exercises Provided and Patient Education Provided     Education provided:   Continue current HEP    Written Home Exercises Provided: Continue with current HEP  Exercises were reviewed and Arpan was able to demonstrate them prior to the end of the session.      Pt received a written copy of exercises to perform at home.   See EMR under patient instructions for exercises given.     Arpan demonstrated good  understanding of the " education provided.     Assessment     The patient has excessive valgus and poor eccentric quad strength with attempted single leg squat    Arpan is progressing well towards his goals.   Pt prognosis is Excellent.     Pt will continue to benefit from skilled outpatient physical therapy to address the deficits listed in the problem list box on initial evaluation, provide pt/family education and to maximize pt's level of independence in the home and community environment.     Pt's spiritual, cultural and educational needs considered and pt agreeable to plan of care and goals.    Anticipated barriers to physical therapy: None    Goals:   Short Term Goals: 4 weeks   1. The patient will demonstrate 0 degrees of affected knee extension AROM to increase ease with symmetry of ambulation.  2. The patient will demonstrate ability to ascend steps reciprocally without pain.  3. The patient will demonstrate 5/5 strength of affected quadriceps and hamstrings to increase ease with going up and down stairs, squatting.     Long Term Goals: 16 weeks   1. The patient will demonstrate 4 degrees of affected knee extension AROM to increase ease with return to jogging and symmetry of ambulation.  2. The patient will demonstrate ability to squat with no discomfort.  3. The patient will demonstrate 4+/5 strength of affected gluteus medius to increase ease with squatting, stairs.    Plan     Progress to light impact if able to perform single leg squat    Jeovanny Esquivel, PT

## 2018-07-25 ENCOUNTER — CLINICAL SUPPORT (OUTPATIENT)
Dept: REHABILITATION | Facility: HOSPITAL | Age: 53
End: 2018-07-25
Attending: FAMILY MEDICINE
Payer: COMMERCIAL

## 2018-07-25 DIAGNOSIS — M25.561 RIGHT MEDIAL KNEE PAIN: ICD-10-CM

## 2018-07-25 PROCEDURE — 97110 THERAPEUTIC EXERCISES: CPT

## 2018-07-25 PROCEDURE — 97140 MANUAL THERAPY 1/> REGIONS: CPT

## 2018-07-25 NOTE — PROGRESS NOTES
"                            Physical Therapy Daily Treatment Note     Name: Arpan Gamboa  Clinic Number: [id number]    Therapy Diagnosis:   Encounter Diagnosis   Name Primary?    Right medial knee pain      Physician: Win Rahman, *    Visit Date: 7/25/2018  Physician Orders: PT Eval and Treat   Medical Diagnosis: Right knee pain  Evaluation Date: 6/27/2018  Authorization Period Expiration: 12/31/2018  Plan of Care Certification Period: 10/17/2018  Precautions: None  Date of Surgery: NA  Visit # / Visits authorized: 6/20    Time In: 720a  Time Out: 815a  Total Billable Time: 55 minutes    Subjective      Pt reports: he was compliant with home exercise program given last session.   Response to previous treatment: feels like he has not gotten much better over the last 3-4 days  Functional change: increased endurance for gait    Pain: 1/10  Location: right knee      Objective     Arpan received therapeutic exercises to develop strength, ROM, flexibility and core stabilization for 40 minutes including:  Education in diagnosis and plan of care  SAQ - 2 x 10, 3"  Prolonged extension stretch - 4', 4#  SB bridge - 2 x 10, 3"  HS stretch - 4 x 20"  Step up with resistance to knee extension - 2 x 15, 6", green  TKE with SL  - 3 x 10, 10  Calf stretch SL - 3 x 30"  SL balance on airex - 3 x 30 tosses, 6# ball - ND  Runners pose - 3 x 30"   Wall sit - 2 x 45" - ND    Arpan received the following manual therapy techniques for 10 minutes, including:  Patellar mobilizations in all directions Gr II-III  Medial hamstrings stretching    Home Exercises Provided and Patient Education Provided     Education provided:   Continue current HEP    Written Home Exercises Provided: Continue with current HEP  Exercises were reviewed and Arpan was able to demonstrate them prior to the end of the session.      Pt received a written copy of exercises to perform at home.   See EMR under patient instructions for exercises given.     Arpan " demonstrated good  understanding of the education provided.     Assessment     The patient demonstrates pain with passive knee extension today, possible extension block caused by meniscal tearing    Arpan is progressing well towards his goals.   Pt prognosis is Excellent.     Pt will continue to benefit from skilled outpatient physical therapy to address the deficits listed in the problem list box on initial evaluation, provide pt/family education and to maximize pt's level of independence in the home and community environment.     Pt's spiritual, cultural and educational needs considered and pt agreeable to plan of care and goals.    Anticipated barriers to physical therapy: None    Goals:   Short Term Goals: 4 weeks   1. The patient will demonstrate 0 degrees of affected knee extension AROM to increase ease with symmetry of ambulation.  2. The patient will demonstrate ability to ascend steps reciprocally without pain.  3. The patient will demonstrate 5/5 strength of affected quadriceps and hamstrings to increase ease with going up and down stairs, squatting.     Long Term Goals: 16 weeks   1. The patient will demonstrate 4 degrees of affected knee extension AROM to increase ease with return to jogging and symmetry of ambulation.  2. The patient will demonstrate ability to squat with no discomfort.  3. The patient will demonstrate 4+/5 strength of affected gluteus medius to increase ease with squatting, stairs.    Plan     Progress to light impact if able to perform single leg squat    Jeovanny Esquivel, PT

## 2018-07-27 ENCOUNTER — CLINICAL SUPPORT (OUTPATIENT)
Dept: REHABILITATION | Facility: HOSPITAL | Age: 53
End: 2018-07-27
Attending: FAMILY MEDICINE
Payer: COMMERCIAL

## 2018-07-27 ENCOUNTER — PATIENT MESSAGE (OUTPATIENT)
Dept: ELECTROPHYSIOLOGY | Facility: CLINIC | Age: 53
End: 2018-07-27

## 2018-07-27 DIAGNOSIS — M25.561 RIGHT MEDIAL KNEE PAIN: ICD-10-CM

## 2018-07-27 PROCEDURE — 97110 THERAPEUTIC EXERCISES: CPT

## 2018-07-27 PROCEDURE — 97140 MANUAL THERAPY 1/> REGIONS: CPT

## 2018-07-27 NOTE — PROGRESS NOTES
"                            Physical Therapy Daily Treatment Note     Name: Arpan Gamboa  Clinic Number: [id number]    Therapy Diagnosis:   Encounter Diagnosis   Name Primary?    Right medial knee pain      Physician: Win Rahman, *    Visit Date: 7/27/2018  Physician Orders: PT Eval and Treat   Medical Diagnosis: Right knee pain  Evaluation Date: 6/27/2018  Authorization Period Expiration: 12/31/2018  Plan of Care Certification Period: 10/17/2018  Precautions: None  Date of Surgery: NA  Visit # / Visits authorized: 7/20    Time In: 220p  Time Out: 330p  Total Billable Time: 70 minutes    Subjective      Pt reports: he was compliant with home exercise program given last session.   Response to previous treatment: feels like he is looser and knee is straighter  Functional change: increased endurance for gait    Pain: 1/10  Location: right knee      Objective     Arpan received therapeutic exercises to develop strength, ROM, flexibility and core stabilization for 49 minutes including:  Education in diagnosis and plan of care  SAQ - 2 x 10, 3"  Prolonged extension stretch - 4', 4#  SB bridge - 2 x 10, 3"  HS stretch - 4 x 20"  Step up with resistance to knee extension - 2 x 15, 6", green  TKE with SL  - 3 x 10, 10  Calf stretch SL - 3 x 30"  SL balance on airex - 3 x 30 tosses, 6# ball   Runners pose - 3 x 30"     Arpan received the following manual therapy techniques for 10 minutes, including:  Patellar mobilizations in all directions Gr II-III  Medial hamstrings stretching    Home Exercises Provided and Patient Education Provided     Education provided:   Continue current HEP    Written Home Exercises Provided: Continue with current HEP  Exercises were reviewed and Arpan was able to demonstrate them prior to the end of the session.      Pt received a written copy of exercises to perform at home.   See EMR under patient instructions for exercises given.     Arpan demonstrated good  understanding of the " education provided.     Assessment     The patient demonstrates improvement in passive knee extension    Arpan is progressing well towards his goals.   Pt prognosis is Excellent.     Pt will continue to benefit from skilled outpatient physical therapy to address the deficits listed in the problem list box on initial evaluation, provide pt/family education and to maximize pt's level of independence in the home and community environment.     Pt's spiritual, cultural and educational needs considered and pt agreeable to plan of care and goals.    Anticipated barriers to physical therapy: None    Goals:   Short Term Goals: 4 weeks   1. The patient will demonstrate 0 degrees of affected knee extension AROM to increase ease with symmetry of ambulation.  2. The patient will demonstrate ability to ascend steps reciprocally without pain.  3. The patient will demonstrate 5/5 strength of affected quadriceps and hamstrings to increase ease with going up and down stairs, squatting.     Long Term Goals: 16 weeks   1. The patient will demonstrate 4 degrees of affected knee extension AROM to increase ease with return to jogging and symmetry of ambulation.  2. The patient will demonstrate ability to squat with no discomfort.  3. The patient will demonstrate 4+/5 strength of affected gluteus medius to increase ease with squatting, stairs.    Plan     Progress to light impact if able to perform single leg squat    Jeovanny Esquivel, PT

## 2018-07-31 ENCOUNTER — TELEPHONE (OUTPATIENT)
Dept: ELECTROPHYSIOLOGY | Facility: CLINIC | Age: 53
End: 2018-07-31

## 2018-07-31 NOTE — TELEPHONE ENCOUNTER
Attempt to call patient- To schedule for EP procedure, called all numbers on demographics- no answer, left message requesting return call.     Chelly ADAMS CCM

## 2018-08-01 ENCOUNTER — CLINICAL SUPPORT (OUTPATIENT)
Dept: REHABILITATION | Facility: HOSPITAL | Age: 53
End: 2018-08-01
Attending: FAMILY MEDICINE
Payer: COMMERCIAL

## 2018-08-01 DIAGNOSIS — M25.561 RIGHT MEDIAL KNEE PAIN: ICD-10-CM

## 2018-08-01 PROCEDURE — 97110 THERAPEUTIC EXERCISES: CPT

## 2018-08-06 ENCOUNTER — OFFICE VISIT (OUTPATIENT)
Dept: OPTOMETRY | Facility: CLINIC | Age: 53
End: 2018-08-06
Payer: COMMERCIAL

## 2018-08-06 DIAGNOSIS — H00.025 HORDEOLUM INTERNUM LEFT LOWER EYELID: ICD-10-CM

## 2018-08-06 DIAGNOSIS — H10.12 ALLERGIC CONJUNCTIVITIS, LEFT EYE: Primary | ICD-10-CM

## 2018-08-06 PROCEDURE — 99999 PR PBB SHADOW E&M-EST. PATIENT-LVL II: CPT | Mod: PBBFAC,,, | Performed by: OPTOMETRIST

## 2018-08-06 PROCEDURE — 92004 COMPRE OPH EXAM NEW PT 1/>: CPT | Mod: S$GLB,,, | Performed by: OPTOMETRIST

## 2018-08-06 RX ORDER — FLUOROMETHOLONE 1 MG/ML
1 SUSPENSION/ DROPS OPHTHALMIC 4 TIMES DAILY
Qty: 5 ML | Refills: 0 | Status: SHIPPED | OUTPATIENT
Start: 2018-08-06 | End: 2018-08-16

## 2018-08-06 RX ORDER — LORATADINE 10 MG/1
10 TABLET ORAL DAILY PRN
COMMUNITY
End: 2020-09-16

## 2018-08-06 NOTE — PROGRESS NOTES
HPI     Mr. Arpan Gamboa is here for an urgent visit.    He says OS has been swollen at lower lid margin to his upper cheeck for   the last 3 days, along with redness of OS, itching, mucus discharge   throughout the day, and pain level 2.5 out of 10 (sharp pain).  Pt says he does suffer from allergies, he also reports that he had been   moving into a new home and had been moving items on Thursday. He does not   remember getting anything in his eye. For relief he tried taking a   Claritin, used Refresh drops once, and also tried cold camomile tea   compress with no relief. He denies any vision changes since onset of   symptoms.   Pt denies any recent symptoms of upper respiratory infection or exposure   to people with red eyes (although there were around lots of people at the   [facility] recently).     (+)drops: Refresh has only used once or twice   (-)flashes  (-)floaters  (-)diplopia    Diabetic no   No results found for: HGBA1C    OCULAR HISTORY   Last Eye Exam April 2016 for conjunctivitis   (-)eye surgery   (-)diagnosed or treated for any eye conditions or diseases none     FAMILY HISTORY  (+)Glaucoma: mother, sister, maternal grandmother         Last edited by Irasema Kurtz, OD on 8/6/2018  3:45 PM. (History)            Assessment /Plan     For exam results, see Encounter Report.    Allergic conjunctivitis, left eye   Start FML drops QID OS  x 7-10 days. Shake bottle well before each use. Discussed potential adverse effects of topical steroids including elevated IOP, stressed importance of using drops no longer than 7-10 days. RTC if symptoms worsen.   Also recommended cool compresses and chilled artificial tears prn.    -     fluorometholone 0.1% (FML) 0.1 % DrpS; Place 1 drop into the left eye 4 (four) times daily. for 10 days  Dispense: 5 mL; Refill: 0    Hordeolum internum left lower eyelid   Recommended warm compresses for 10 minutes at least QID. Avoid rubbing eyes. Advised pt to RTC immediately if  symptoms worsen, vision changes, or fever develops.       RTC prn

## 2018-08-06 NOTE — PATIENT INSTRUCTIONS
You have ocular allergies that are causing your eyes to feel itchy. To help with your symptoms please use Fluorometholone drops: 1 drop, left eye, 4 times per day, for 7-10 days. You can also use over-the-counter Zaditor or Alaway; 1 drop in each eye 2 times per day. You can also use cool compresses and chilled artificial tears as needed.     Also, please note, steroid eye drops can sometimes increase the pressure of the fluid inside the eye. If this happens you may not feel any symptoms, but a high eye pressure can cause permanent damage to your eyes resulting in vision loss (this is called steroid-induced glaucoma). If we find that your eye pressure is elevated, then you need to take medication to lower the eye pressure until you finish your course of steroid eye drops. This is one reason why it is very important that you return for all follow-up visits recommended by your eye doctor.    If you have any problems after hours or over the weekend, please call the [facility]  at [phone] and ask that the on-call Ophthalmologist be paged.         ==============================================    You have a hordeolum, or stye, which is an infection of one of the glands in your eyelids. Please use warm compresses for 10 minutes, at least 3-4 times per day.   Heat a wash cloth by running it under hot water. Then hold this wash cloth over your closed eyelids and allow your eyelids to absorb the heat. After 20-30 seconds once the wash cloth cools down you'll need to heat it up again.  (An alternative heat source is a gel pack warmed in the mircrowave or in a dish of water,  wrapped with a warm wet washcloth).     If you use warm compresses consistently then the hordeolum should resolve in 1-2 weeks. If your symptoms get worse, the redness spreads across your entire eyelid, or you develop any vision changes, please let us know immediately.

## 2018-08-07 ENCOUNTER — TELEPHONE (OUTPATIENT)
Dept: ELECTROPHYSIOLOGY | Facility: CLINIC | Age: 53
End: 2018-08-07

## 2018-08-07 NOTE — TELEPHONE ENCOUNTER
Spoke with patient. Patient reports that he is no longer interested in ablation procedure at this time. Message routed to .      Chelly ADAMS CCM

## 2018-08-09 ENCOUNTER — NURSE TRIAGE (OUTPATIENT)
Dept: ADMINISTRATIVE | Facility: CLINIC | Age: 53
End: 2018-08-09

## 2018-08-09 NOTE — TELEPHONE ENCOUNTER
"    Reason for Disposition   Eye pain present > 24 hours    Answer Assessment - Initial Assessment Questions  1. ONSET: "When did the pain start?" (e.g., minutes, hours, days)      Few days  2. TIMING: "Does the pain come and go, or has it been constant since it started?" (e.g., constant, intermittent, fleeting)      constant  3. SEVERITY: "How bad is the pain?"   (Scale 1-10; mild, moderate or severe)    - MILD (1-3): doesn't interfere with normal activities     - MODERATE (4-7): interferes with normal activities or awakens from sleep     - SEVERE (8-10): excruciating pain and patient unable to do normal activities      Mild to moderate, 4/10  4. LOCATION: "Where does it hurt?"  (e.g., eyelid, eye, cheekbone)      eye  5. CAUSE: "What do you think is causing the pain?"      Computers  6. VISION: "Do you have blurred vision or changes in your vision?"       no  7. EYE DISCHARGE: "Is there any discharge (pus) from the eye(s)?"  If yes, ask: "What color is it?"       no  8. FEVER: "Do you have a fever?" If so, ask: "What is it, how was it measured, and when did it start?"       no  9. OTHER SYMPTOMS: "Do you have any other symptoms?" (e.g., headache, nasal discharge, facial rash)      no  10. PREGNANCY: "Is there any chance you are pregnant?" "When was your last menstrual period?"        n/a    Protocols used: ST EYE PAIN-A-      "

## 2018-08-10 ENCOUNTER — TELEPHONE (OUTPATIENT)
Dept: OPTOMETRY | Facility: CLINIC | Age: 53
End: 2018-08-10

## 2018-08-10 ENCOUNTER — OFFICE VISIT (OUTPATIENT)
Dept: OPTOMETRY | Facility: CLINIC | Age: 53
End: 2018-08-10
Payer: COMMERCIAL

## 2018-08-10 DIAGNOSIS — H10.12 ALLERGIC CONJUNCTIVITIS, LEFT EYE: ICD-10-CM

## 2018-08-10 DIAGNOSIS — H00.025 HORDEOLUM INTERNUM LEFT LOWER EYELID: ICD-10-CM

## 2018-08-10 DIAGNOSIS — H52.223 REGULAR ASTIGMATISM WITH PRESBYOPIA, BILATERAL: ICD-10-CM

## 2018-08-10 DIAGNOSIS — H53.022 MERIDIONAL AMBLYOPIA, LEFT: ICD-10-CM

## 2018-08-10 DIAGNOSIS — H57.12 EYE PAIN, LEFT: Primary | ICD-10-CM

## 2018-08-10 DIAGNOSIS — H52.4 REGULAR ASTIGMATISM WITH PRESBYOPIA, BILATERAL: ICD-10-CM

## 2018-08-10 DIAGNOSIS — H52.31 ANISOMETROPIA: ICD-10-CM

## 2018-08-10 PROCEDURE — 92012 INTRM OPH EXAM EST PATIENT: CPT | Mod: S$GLB,,, | Performed by: OPTOMETRIST

## 2018-08-10 PROCEDURE — 92015 DETERMINE REFRACTIVE STATE: CPT | Mod: S$GLB,,, | Performed by: OPTOMETRIST

## 2018-08-10 PROCEDURE — 99999 PR PBB SHADOW E&M-EST. PATIENT-LVL II: CPT | Mod: PBBFAC,,, | Performed by: OPTOMETRIST

## 2018-08-10 NOTE — PATIENT INSTRUCTIONS
"Computer Use Tips     1.   Position your computer monitor five to nine inches below the horizontal line of sight so that when you look straight ahead, you look just over the top of the monitor. An upward gaze exposes 40% more of the cornea, which dries out the tear film and compounds the effects of the already dry environment in many office buildings.  2.   Sit far enough away that you can't touch the monitor without leaning forward. A good minimum working distance is about 24 to 26 inches.   3.   If you use a laptop frequently, get a separate monitor and/or keyboard.   4.   Set computer monitor contrast at high.   5.   Turn down overall room lighting and use a task lamp for reading papers.   6.   If you have a window, position the computer so that the window is off to the side, not directly in front of, or behind the desk.   7.   Put a note that says "Blink" next to your computer screen.  Studies show that we spontaneously blink 18 to 22 times per minute under normal conditions. But during computer use, most likely due to the increased visual attention required, the average blink rate drops to seven per minute, or about one-third the normal rate.  8.   Every 20 minutes, take a 20-second break and look at something at least 20 feet away.    ==============================================    DRY EYES     Dry eyes is a common condition. It can be caused by an insufficient volume of tears, or by tears that do not spread evenly over the cornea. An uneven tear film can also cause vision to blur intermittently.   Dry eyes are often associated with burning, stinging, and/or red eyes.As we age, the eyes usually produce fewer tears and result in decreased normal eye lubrication. Paradoxically, dry eye can make eyes water. When they water, that watery production actually makes the dry eye situation worse because the watery tears do not lubricate the eye well at all. Watery tears really serve to flush foreign material out of " "the eye, not to lubricate. Unfortunately, when the eyes get really dry they become irritated and stimulate the formation of watery tears.   Lubricants, in the form of drops or ointments, are the principal treatment for dry eyes. The following are recommendations for managing your dry eye condition:    1) Use over-the-counter artificial tears or lubricants (such as Systane Balance, Soothe XP, Refresh Optive, Blink, or Genteal), 1 drop in each eye 3 to 4 times a day. Please avoid drops that advertise redness relief since these can be unhealthy for your eyes and can cause permanent redness if used long-term.     It is best to take artificial tears on a schedule, like you would for a medication. Taking them routinely at breakfast, lunch, and dinner for example will provide better relief and better use of the tear drop than taking them whenever your eyes "feel" dry.    2) Optional use of over-the-counter lubricating gels (such as Genteal Gel, Systane Gel, or Refresh PM) applied to the inside of the lower lid of both eyes at bedtime. This gel is very thick and may cause blurred vision, so we recommend you use it before bedtime. In the morning you can gently wipe your eyes with a damp washcloth to remove any residual gel.    3) Warm compresses applied to the closed eyelids twice per day, followed with lid massage.    4) Always drink an adequate amount of water daily (4-6 cups a day).    5) 1000 mg of good quality fish oil (labeled DHA and/or EPA). Flaxseed oil can also be beneficial. Do not take fish oil if you are currently taking a medication called warfarin or coumadin.    6) Use a humidifier in your bedroom.    7) If the dryness continues there may be additional options of punctal plugs, or prescription dry eye drops such as Restasis or Xiidra. Punctal plugs are medical devices inserted into the puncta or the drain of the eye to block some of your natural tears from draining off the eye. Restasis and Xiidra are " prescription eye drops that, over time, may help your body make more tears naturally.    It is important to maintain this treatment plan until your symptoms have improved. Once improved, you can reduce the frequency of lubricants and warm compresses. If the symptoms recur, then apply as needed for comfort.     ==============================================    If you have any problems after hours or over the weekend, please call the [facility]  at [phone] and ask that the on-call Ophthalmologist be paged.

## 2018-08-10 NOTE — PROGRESS NOTES
HPI     Mr. Arpan Gamboa is here for an urgent visit.    He reports eye pain at top/behind OS for 2-3 days, was at it worst   yesterday (pain scale of 4), he says this is a different pain than he   reported at his previous visit. Pain affects the globe, he denies eyelid   pain.   This pain occurs around mid-afternoon (after sustained computer use), and   subsides in the evening.    He says eyelid pain and itchiness reported at last visit have nearly fully   resolved. FML drops not in stock so he has not yet picked them up. He has   been using Zaditor BID OS and hot compresses a few times per day.    (+)drops: Zaditor BID OS   (-)flashes  (-)floaters  (-)diplopia    Diabetic no   No results found for: HGBA1C    OCULAR HISTORY   Last Eye Exam 08/06/18 with Dr. Kurtz   (-)eye surgery   Allergic conjunctivitis, left eye  Hordeolum internum left lower eyelid  Pt says vision OS has always been worse than OD since childhood.    FAMILY HISTORY  (+)Glaucoma: mother, sister, maternal grandmother         Last edited by Irasema Kurtz, OD on 8/10/2018  3:46 PM. (History)            Assessment /Plan     For exam results, see Encounter Report.    Eye pain, left   Due to asthenopia and/or dry eyes. Recommended pt make adjustments to his computer set-up so he is looking through add power of PAL glasses. Also recommended artificial tears BID OU.     Allergic conjunctivitis, left eye   Improving signs and symptoms with Zaditor BID OS, continue same management.   FML drops were out of stock, pt declines prescription for alternative medication (okay to call in prescription if pt changes his mind later, consider Maxitrol TID OS x 7 days).    Hordeolum internum left lower eyelid   Improving. Continue warm compresses until fully resolved.     Regular astigmatism with presbyopia, bilateral  Anisometropia   Stable OD, small change OS. New glasses prescription released, adaptation expected.  New glasses optional.   Eyeglass Final Rx      Eyeglass Final Rx       Sphere Cylinder Axis Add    Right -2.75 +0.50 095 +2.00    Left [place] +2.25 073 +2.00    Type:  PAL    Expiration Date:  8/11/2019            Meridional amblyopia, left   Cause of reduced best-corrected visual acuity OS (20/30). Monitor.      RTC prn

## 2018-08-24 ENCOUNTER — CLINICAL SUPPORT (OUTPATIENT)
Dept: REHABILITATION | Facility: HOSPITAL | Age: 53
End: 2018-08-24
Attending: FAMILY MEDICINE
Payer: COMMERCIAL

## 2018-08-24 DIAGNOSIS — M25.561 RIGHT MEDIAL KNEE PAIN: ICD-10-CM

## 2018-08-24 PROCEDURE — 97110 THERAPEUTIC EXERCISES: CPT

## 2018-08-24 NOTE — PROGRESS NOTES
"                            Physical Therapy Daily Treatment Note     Name: Arpan Gamboa  Clinic Number: [id number]    Therapy Diagnosis:   Encounter Diagnosis   Name Primary?    Right medial knee pain      Physician: Win Rahman, *    Visit Date: 8/24/2018  Physician Orders: PT Eval and Treat   Medical Diagnosis: Right knee pain  Evaluation Date: 6/27/2018  Authorization Period Expiration: 12/31/2018  Plan of Care Certification Period: 10/17/2018  Precautions: None  Date of Surgery: NA  Visit # / Visits authorized: 9/20    Time In: 205p  Time Out: 310a  Total Billable Time: 65 minutes    Subjective      Pt reports: he was compliant with home exercise program given last session.   Response to previous treatment: feels like he is having more restriction with straightening his knee when walking.t    Pain: 1/10  Location: right knee      Objective     Arpan received therapeutic exercises to develop strength, ROM, flexibility and core stabilization for 49 minutes including:  Education in diagnosis and plan of care  SAQ - 2 x 10, 3"  Prolonged extension stretch - 4', 4#  SB bridge - 2 x 10, 3"  HS stretch - 4 x 20"  SL RDL with knee extension pull - 2 x 10, green  TKE with SL  - 3 x 10, 10  Calf stretch SL - 3 x 30"  SL balance on airex - 3 x 30 tosses, 6# ball   Runners pose - 3 x 30"     Arpan received the following manual therapy techniques for 6 minutes, including:  Patellar mobilizations in all directions Gr II-III  Medial hamstrings stretching    Home Exercises Provided and Patient Education Provided     Education provided:   Continue current HEP    Written Home Exercises Provided: Continue with current HEP  Exercises were reviewed and Arpan was able to demonstrate them prior to the end of the session.      Pt received a written copy of exercises to perform at home.   See EMR under patient instructions for exercises given.     Arpan demonstrated good  understanding of the education provided. "     Assessment     The patient demonstrates increased extension block and pain with passive extension, may be appropriate for follow up with doctor at this time    Arpan is progressing well towards his goals.   Pt prognosis is Excellent.     Pt will continue to benefit from skilled outpatient physical therapy to address the deficits listed in the problem list box on initial evaluation, provide pt/family education and to maximize pt's level of independence in the home and community environment.     Pt's spiritual, cultural and educational needs considered and pt agreeable to plan of care and goals.    Anticipated barriers to physical therapy: None    Goals:   Short Term Goals: 4 weeks   1. The patient will demonstrate 0 degrees of affected knee extension AROM to increase ease with symmetry of ambulation.  2. The patient will demonstrate ability to ascend steps reciprocally without pain.  3. The patient will demonstrate 5/5 strength of affected quadriceps and hamstrings to increase ease with going up and down stairs, squatting.     Long Term Goals: 16 weeks   1. The patient will demonstrate 4 degrees of affected knee extension AROM to increase ease with return to jogging and symmetry of ambulation.  2. The patient will demonstrate ability to squat with no discomfort.  3. The patient will demonstrate 4+/5 strength of affected gluteus medius to increase ease with squatting, stairs.    Plan     Patient to f/u with Dr. ERAZO on Monday and determine appropriate plan    Jeovanny Esquivel, PT

## 2018-08-24 NOTE — PROGRESS NOTES
Arpan Gamboa, a 52 y.o. male, presents today for evaluation of his RIGHT knee.      History of Present Illness (HPI)  Location: anterior knee  Onset: insidious, chronic  Palliative:    Relative rest   Oral analgesics   06/13/18, CSI iarennyee, No improvement   fPT, - on 8/24/18 visit [facility] ROSADO noted knee extension issues  Provocative:    Prolonged ambulation  Prior: none  Progression: plateau discomfort  Quality: sharp pain  Radiation: none  Severity: per nursing documentation  Timing: intermittent w/ use  Trauma:     Review of Systems (ROS)  A 10+ review of systems was performed with pertinent positives and negatives noted above in the history of present illness. Other systems were negative unless otherwise specified.    Physical Examination (PE)  General:  The patient is alert and oriented x 3. Mood is pleasant. Observation of ears, eyes and nose reveal no gross abnormalities. HEENT: NCAT, sclera anicteric.   Lungs: Respirations are equal and unlabored.  Gait is coordinated. Patient can toe walk and heel walk without difficulty.    KNEE EXAMINATION    Observation/Inspection  Gait:   Nonantalgic   Alignment:  Neutral   Scars:   None   Muscle atrophy: Mild  Effusion:  None   Warmth:  None   Discoloration:   none     Tenderness / Crepitus (T / C):         T / C      T / C  Patella   - / -   Lateral joint line   - / -     Peripatellar medial  -  Medial joint line    + / -  Peripatellar lateral -  Medial plica   - / -  Patellar tendon -   Popliteal fossa   - / -  Quad tendon   -   Gastrocnemius   -  Prepatellar Bursa - / -   Quadricep   -  Tibial tubercle  -  Thigh/hamstring  -  Pes anserine/HS -  Fibula    -  ITB   - / -  Tibia     -  Tib/fib joint  - / -  LCL    -    MFC   - / -   MCL: Proximal  -    LFC   - / -   Distal    -          ROM: (* = pain)  PASSIVE   ACTIVE    Left :   5 / 0 / 145   5 / 0 / 145     Right :    5 / 0 / 145   5 / 0 / 145    Patellofemoral examination:  See above noted areas of tenderness.    Patella position    Subluxation / dislocation: Centered        Sup. / Inf;   Normal   Crepitus (PF):    Absent   Patellar Mobility:       Medial-lateral:   Normal    Superior-inferior:  Normal    Inferior tilt   Normal    Patellar tendon:  Normal   Lateral tilt:    Normal   J-sign:     None   Patellofemoral grind:   No pain     Meniscal Signs:     Pain on terminal extension:  +  Pain on terminal flexion:  +  Jeannies maneuver:  +*  Squat     NT    Ligament Examination:  ACL / Lachman:  WNL  PCL-Post.  drawer: normal 0 to 2mm  MCL- Valgus:  normal 0 to 2mm  LCL- Varus:    normal 0 to 2mm  Pivot shift:  guarding   Dial Test:   difference c/w other side   At 30° flexion: normal (< 5°)    At 90° flexion: normal (< 5°)   Reverse Pivot Shift:   normal (Equal)     Strength: (* = with pain) Painful Side  Quadriceps   5/5  Hamstrin/5    Extremity Neuro-vascular Examination:   Sensation:  Grossly intact to light touch all dermatomal regions.   Motor Function:  Fully intact motor function at hip, knee, foot and ankle    DTRs;  quadriceps and  achilles 2+.  No clonus and downgoing Babinski.    Vascular status:  DP and PT pulses 2+, brisk capillary refill, symmetric.     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     [We discussed the importance of appropriate diet, weight, and regular exercise including quadriceps strengthening     We discussed options including:  #1 watchful waiting  #2 re start physical therapy aimed  at:   Core stability   RoM knee   Strengthening quadriceps   Gait training   #3 injection therapy:   CSI iaknee     Right, effective good%, repeat     Left,    VSI iaknee    Right,     Left,    Orthobiologics   #4 consultation      The patient chooses #]    Pain management required: handout given  Bracing required:   Physical therapy required: fPT, @ [facility],  as above ([place])  Activity (e.g. sports, work) restrictions: as tolerated   school/vocation: active lifestyle (walks 15min to work everyday), would like to return to recreational running    Works for [employer]    Follow up after MRI knee   Should symptoms worsen or fail to resolve, consider:  Revisiting the above options

## 2018-08-27 ENCOUNTER — OFFICE VISIT (OUTPATIENT)
Dept: SPORTS MEDICINE | Facility: CLINIC | Age: 53
End: 2018-08-27
Payer: COMMERCIAL

## 2018-08-27 VITALS — TEMPERATURE: 98 F | WEIGHT: 178 LBS | BODY MASS INDEX: 24.11 KG/M2 | HEIGHT: 72 IN

## 2018-08-27 DIAGNOSIS — R53.81 PHYSICAL DECONDITIONING: ICD-10-CM

## 2018-08-27 DIAGNOSIS — M25.561 CHRONIC PAIN OF RIGHT KNEE: ICD-10-CM

## 2018-08-27 DIAGNOSIS — M25.561 MECHANICAL KNEE PAIN, RIGHT: Primary | ICD-10-CM

## 2018-08-27 DIAGNOSIS — G89.29 CHRONIC PAIN OF RIGHT KNEE: ICD-10-CM

## 2018-08-27 PROCEDURE — 99214 OFFICE O/P EST MOD 30 MIN: CPT | Mod: S$GLB,,, | Performed by: FAMILY MEDICINE

## 2018-08-27 PROCEDURE — 3008F BODY MASS INDEX DOCD: CPT | Mod: CPTII,S$GLB,, | Performed by: FAMILY MEDICINE

## 2018-08-27 PROCEDURE — 99999 PR PBB SHADOW E&M-EST. PATIENT-LVL III: CPT | Mod: PBBFAC,,, | Performed by: FAMILY MEDICINE

## 2018-09-05 ENCOUNTER — PATIENT MESSAGE (OUTPATIENT)
Dept: SPORTS MEDICINE | Facility: CLINIC | Age: 53
End: 2018-09-05

## 2018-09-05 ENCOUNTER — TELEPHONE (OUTPATIENT)
Dept: SPORTS MEDICINE | Facility: CLINIC | Age: 53
End: 2018-09-05

## 2018-09-05 NOTE — TELEPHONE ENCOUNTER
Calling patient RE F/U for Mri results. Patient cancelled his MRI.  Wanting to know if he is feeling better?    NADIA Milton   Sports Medicine Assistant   [facility]

## 2018-10-08 ENCOUNTER — TELEPHONE (OUTPATIENT)
Dept: DERMATOLOGY | Facility: CLINIC | Age: 53
End: 2018-10-08

## 2018-10-08 NOTE — TELEPHONE ENCOUNTER
----- Message from Aurora Menendez sent at 10/8/2018  3:46 PM CDT -----  Contact: pt   Name of Who is Calling: DORINA CABRERA [[id number]]    What is the request in detail:Patieint states he is returning a call to staff.....Please contact to further discuss and advise      Can the clinic reply by [facility]: No     What Number to Call Back if not in [facility]: [phone].

## 2018-11-09 ENCOUNTER — OFFICE VISIT (OUTPATIENT)
Dept: DERMATOLOGY | Facility: CLINIC | Age: 53
End: 2018-11-09
Payer: COMMERCIAL

## 2018-11-09 DIAGNOSIS — L72.0 EIC (EPIDERMAL INCLUSION CYST): ICD-10-CM

## 2018-11-09 DIAGNOSIS — L21.9 SEBORRHEIC DERMATITIS: Primary | ICD-10-CM

## 2018-11-09 DIAGNOSIS — L24.3 IRRITANT CONTACT DERMATITIS DUE TO COSMETICS: ICD-10-CM

## 2018-11-09 DIAGNOSIS — L82.1 SEBORRHEIC KERATOSIS: ICD-10-CM

## 2018-11-09 DIAGNOSIS — D22.5 MELANOCYTIC NEVI OF TRUNK: ICD-10-CM

## 2018-11-09 PROCEDURE — 99203 OFFICE O/P NEW LOW 30 MIN: CPT | Mod: S$GLB,,, | Performed by: DERMATOLOGY

## 2018-11-09 RX ORDER — KETOCONAZOLE 20 MG/ML
SHAMPOO, SUSPENSION TOPICAL
Qty: 240 ML | Refills: 3 | Status: SHIPPED | OUTPATIENT
Start: 2018-11-09 | End: 2020-09-16

## 2018-11-09 RX ORDER — TRIAMCINOLONE ACETONIDE 1 MG/G
CREAM TOPICAL
Qty: 80 G | Refills: 0 | Status: SHIPPED | OUTPATIENT
Start: 2018-11-09 | End: 2020-06-09

## 2018-11-09 NOTE — LETTER
November 9, 2018      Other  [address] [place] [address]           [facility] - Dermatology  [address] [place] [address]  Phone: [phone]  Fax: [phone]          Patient: Arpan Gamboa   MR Number: [id number]   YOB: 1965   Date of Visit: 11/9/2018       Dear Other:    Thank you for referring Arpan Gamboa to me for evaluation. Attached you will find relevant portions of my assessment and plan of care.    If you have questions, please do not hesitate to call me. I look forward to following Arpan Gamboa along with you.    Sincerely,    Lilo Cash MD    Enclosure  CC:  No Recipients    If you would like to receive this communication electronically, please contact [email or web address] or [phone] to request more information on [employer] Link access.    For providers and/or their staff who would like to refer a patient to [facility], please contact us through our one-stop-shop provider referral line, [facility], at [phone].    If you feel you have received this communication in error or would no longer like to receive these types of communications, please e-mail [email or web address]

## 2018-11-09 NOTE — PROGRESS NOTES
Subjective:       Patient ID:  Arpan Gamboa is a 53 y.o. male who presents for   Chief Complaint   Patient presents with    Hair/Scalp Problem     flakey    Growth     back     Hair/Scalp Problem  - Initial  Affected locations: scalp  Duration: 2 months  Signs / symptoms: scaling, peeling, redness and itching  Severity: mild to moderate  Timing: intermittent  Exacerbated by: new cologne.  Treatments tried: stopped spraying cologne on skin.  Improvement on treatment: significant    Growth  - Initial  Affected locations: back  Duration: 5 years  Signs / symptoms: growing and itching  Severity: mild to moderate  Timing: constant  Aggravated by: pressure, friction and scratching  Relieving factors/Treatments tried: nothing      Review of Systems   Musculoskeletal: Positive for joint swelling and arthralgias (hands).   Skin: Positive for itching, rash and dry skin. Negative for recent sunburn.   Hematologic/Lymphatic: Does not bruise/bleed easily.        Objective:    Physical Exam   Constitutional: He appears well-developed and well-nourished. No distress.   HENT:   Mouth/Throat: Lips normal.    Eyes: Lids are normal.  No conjunctival no injection.   Neurological: He is alert and oriented to person, place, and time. He is not disoriented.   Psychiatric: He has a normal mood and affect.   Skin:   Areas Examined (abnormalities noted in diagram):   Scalp / Hair Palpated and Inspected  Head / Face Inspection Performed  Neck Inspection Performed  Chest / Axilla Inspection Performed  Abdomen Inspection Performed  Back Inspection Performed  RUE Inspected  LUE Inspection Performed  Nails and Digits Inspection Performed                   Diagram Legend     Erythematous scaling macule/papule c/w actinic keratosis       Vascular papule c/w angioma      Pigmented verrucoid papule/plaque c/w seborrheic keratosis      Yellow umbilicated papule c/w sebaceous hyperplasia      Irregularly shaped tan macule c/w lentigo     1-2  mm smooth white papules consistent with Milia      Movable subcutaneous cyst with punctum c/w epidermal inclusion cyst      Subcutaneous movable cyst c/w pilar cyst      Firm pink to brown papule c/w dermatofibroma      Pedunculated fleshy papule(s) c/w skin tag(s)      Evenly pigmented macule c/w junctional nevus     Mildly variegated pigmented, slightly irregular-bordered macule c/w mildly atypical nevus      Flesh colored to evenly pigmented papule c/w intradermal nevus       Pink pearly papule/plaque c/w basal cell carcinoma      Erythematous hyperkeratotic cursted plaque c/w SCC      Surgical scar with no sign of skin cancer recurrence      Open and closed comedones      Inflammatory papules and pustules      Verrucoid papule consistent consistent with wart     Erythematous eczematous patches and plaques     Dystrophic onycholytic nail with subungual debris c/w onychomycosis     Umbilicated papule    Erythematous-base heme-crusted tan verrucoid plaque consistent with inflamed seborrheic keratosis     Erythematous Silvery Scaling Plaque c/w Psoriasis     See annotation      Assessment / Plan:        Seborrheic dermatitis  -     ketoconazole (NIZORAL) 2 % shampoo; Lather scalp for 5-10 min, then rinse. Use 2-3 times per week.  Dispense: 240 mL; Refill: 3    Irritant contact dermatitis due to cosmetics  -     triamcinolone acetonide 0.1% (KENALOG) 0.1 % cream; Apply to affected areas of SCALP bid prn itching/flaking.  Dispense: 80 g; Refill: 0    Melanocytic nevi of trunk  Benign-appearing nevi on exam today. Counseled patient to monitor mole(s) and return to clinic if any changes in size, shape, or color are noted or if it becomes symptomatic (bleeding, itching, pain, etc).    EIC (epidermal inclusion cyst)  This is a benign cyst of the hair follicle. Reassurance provided. No treatment is necessary unless it is symptomatic.     Seborrheic keratosis  These are benign, inherited growths without a malignant  potential. Reassurance given to patient. No treatment is necessary. Handout was provided.    Follow-up if symptoms worsen or fail to improve.

## 2018-11-19 ENCOUNTER — OFFICE VISIT (OUTPATIENT)
Dept: OTOLARYNGOLOGY | Facility: CLINIC | Age: 53
End: 2018-11-19
Payer: COMMERCIAL

## 2018-11-19 VITALS
DIASTOLIC BLOOD PRESSURE: 65 MMHG | SYSTOLIC BLOOD PRESSURE: 109 MMHG | BODY MASS INDEX: 24.64 KG/M2 | HEART RATE: 94 BPM | WEIGHT: 181.69 LBS

## 2018-11-19 DIAGNOSIS — R09.81 NASAL CONGESTION: Primary | ICD-10-CM

## 2018-11-19 PROCEDURE — 99203 OFFICE O/P NEW LOW 30 MIN: CPT | Mod: S$GLB,,, | Performed by: OTOLARYNGOLOGY

## 2018-11-19 PROCEDURE — 99999 PR PBB SHADOW E&M-EST. PATIENT-LVL III: CPT | Mod: PBBFAC,,, | Performed by: OTOLARYNGOLOGY

## 2018-11-19 PROCEDURE — 3008F BODY MASS INDEX DOCD: CPT | Mod: CPTII,S$GLB,, | Performed by: OTOLARYNGOLOGY

## 2018-11-19 NOTE — LETTER
November 27, 2018      Dinesh Meza MD  [address] [facility] [address]           [facility]  [address]  [facility] [address]  Phone: [phone]  Fax: [phone]          Patient: Arpan Gamboa   MR Number: [id number]   YOB: 1965   Date of Visit: 11/19/2018       Dear Dr. Dinesh Meza:    Thank you for referring Arpan Gamboa to me for evaluation. Attached you will find relevant portions of my assessment and plan of care.    If you have questions, please do not hesitate to call me. I look forward to following Arpan Gamboa along with you.    Sincerely,    Ayaka Osorio MD    Enclosure  CC:  No Recipients    If you would like to receive this communication electronically, please contact [email or web address] or [phone] to request more information on [employer] Link access.    For providers and/or their staff who would like to refer a patient to [facility], please contact us through our one-stop-shop provider referral line, [facility], at [phone].    If you feel you have received this communication in error or would no longer like to receive these types of communications, please e-mail [email or web address]

## 2018-11-27 NOTE — PROGRESS NOTES
Chief Complaint   Patient presents with    Consult     Acute nasal congestion and pain         53 y.o. male presents with recently increased nasal congestion with associated pain. He is on CPAP at night for AMANDEEP. He has a baseline of nasal congestion from allergies but recently has gotten worse. No facial pain, tooth pain or change in smell. He denies fevers. No epistaxis. Currently on Floase and Claritin prn.      Review of Systems     Constitutional: Negative for fatigue and unexpected weight change.   HENT: per HPI.  Eyes: Negative for visual disturbance.   Respiratory: Negative for shortness of breath, hemoptysis  Cardiovascular: Negative for chest pain and palpitations.   Genitourinary: Negative for dysuria and difficulty urinating.   Musculoskeletal: Negative for decreased ROM, back pain.   Skin: Negative for rash, sunburn, itching.   Neurological: Negative for dizziness and seizures.   Hematological: Negative for adenopathy. Does not bruise/bleed easily.   Psychiatric/Behavioral: Negative for agitation. The patient is not nervous/anxious.   Endocrine: Negative for rapid weight loss/weight gain, heat/cold intolerance.     Past Medical History:   Diagnosis Date    Allergy     Asthma     Sleep apnea     Tachycardia     WPW       Past Surgical History:   Procedure Laterality Date    hernia repair at 3 yrs old         family history includes COPD in his father; Hypertension in his mother; Lupus in his sister; Prostatitis in his father.    Pt  reports that  has never smoked. he has never used smokeless tobacco. He reports that he drinks alcohol.    Review of patient's allergies indicates:   Allergen Reactions    Dog hair standardized allergenic extract         Physical Exam    Vitals:    11/19/18 1537   BP: 109/65   Pulse: 94     Body mass index is 24.64 kg/m².    Physical Exam   Constitutional: He is oriented to person, place, and time. He appears well-developed and well-nourished. No distress.   HENT:    Head: Normocephalic and atraumatic.   Right Ear: Hearing, tympanic membrane, external ear and ear canal normal. Tympanic membrane mobility is normal. No middle ear effusion. No decreased hearing is noted.   Left Ear: Hearing, tympanic membrane, external ear and ear canal normal. Tympanic membrane mobility is normal.  No middle ear effusion. No decreased hearing is noted.   Nose: Mucosal edema present. Right sinus exhibits no maxillary sinus tenderness and no frontal sinus tenderness. Left sinus exhibits no maxillary sinus tenderness and no frontal sinus tenderness.   Mouth/Throat: Oropharynx is clear and moist.   Bilateral nasal cavities with dry mucosa. No polyps, lesions or purulent fluid seen.    Eyes: Conjunctivae, EOM and lids are normal. Pupils are equal, round, and reactive to light. Right eye exhibits no discharge. Left eye exhibits no discharge.   Neck: Trachea normal, normal range of motion and phonation normal. Neck supple. No tracheal tenderness present. No tracheal deviation, no edema and no erythema present. No thyroid mass and no thyromegaly present.   Cardiovascular: Normal heart sounds.   Pulmonary/Chest: Breath sounds normal. No stridor.   Abdominal: Soft.   Lymphadenopathy:     He has no cervical adenopathy.   Neurological: He is alert and oriented to person, place, and time.   Skin: Skin is warm and dry. No rash noted. He is not diaphoretic. No erythema. No pallor.   Psychiatric: He has a normal mood and affect.   Nursing note and vitals reviewed.        Assessment     1. Nasal congestion          Plan  In summary, Mr. Gamboa is a 53 year old male with increased nasal congestion and pain. Exam shows dry nasal mucosa, likely from CPAP use. Recommend addition of nasal saline for improved mucociliary clearance. Vaseline at night as tolerated. RTC if symptoms fail to improve or worsen.

## 2018-12-17 ENCOUNTER — OFFICE VISIT (OUTPATIENT)
Dept: URGENT CARE | Facility: CLINIC | Age: 53
End: 2018-12-17
Payer: COMMERCIAL

## 2018-12-17 VITALS
SYSTOLIC BLOOD PRESSURE: 120 MMHG | RESPIRATION RATE: 18 BRPM | HEART RATE: 89 BPM | OXYGEN SATURATION: 96 % | TEMPERATURE: 99 F | DIASTOLIC BLOOD PRESSURE: 68 MMHG

## 2018-12-17 DIAGNOSIS — B34.9 VIRAL SYNDROME: Primary | ICD-10-CM

## 2018-12-17 DIAGNOSIS — J06.9 UPPER RESPIRATORY TRACT INFECTION, UNSPECIFIED TYPE: ICD-10-CM

## 2018-12-17 LAB
CTP QC/QA: YES
CTP QC/QA: YES
FLUAV AG NPH QL: NEGATIVE
FLUBV AG NPH QL: NEGATIVE
S PYO RRNA THROAT QL PROBE: NEGATIVE

## 2018-12-17 PROCEDURE — 87880 STREP A ASSAY W/OPTIC: CPT | Mod: QW,S$GLB,, | Performed by: NURSE PRACTITIONER

## 2018-12-17 PROCEDURE — 99213 OFFICE O/P EST LOW 20 MIN: CPT | Mod: S$GLB,,, | Performed by: NURSE PRACTITIONER

## 2018-12-17 PROCEDURE — 87804 INFLUENZA ASSAY W/OPTIC: CPT | Mod: 59,QW,S$GLB, | Performed by: NURSE PRACTITIONER

## 2018-12-17 NOTE — LETTER
December 17, 2018      [facility] [place] [address] [facility] [address]  Phone: [phone]  Fax: [phone]       Patient: Arpan Gamboa   YOB: 1965  Date of Visit: 12/17/2018    To Whom It May Concern:    Danette Gamboa  was at [facility] on 12/17/2018. He may return to work/school on 12/18/2018 with no restrictions. If you have any questions or concerns, or if I can be of further assistance, please do not hesitate to contact me.    Sincerely,    Claritza Capone MA

## 2018-12-17 NOTE — PATIENT INSTRUCTIONS
"Continue flonase and claritin.   Take over the counter Ibuprofen or Tylenol for fever/pain relief.  Please arrange follow up with your primary medical clinic as soon as possible. You must understand that you've received an Urgent Care treatment only and that you may be released before all of your medical problems are known or treated. You, the patient, will arrange for follow up as instructed. If your symptoms worsen or fail to improve you should go to the Emergency Room.      Viral Syndrome (Adult)  A viral illness may cause a number of symptoms. The symptoms depend on the part of the body that the virus affects. If it settles in your nose, throat, and lungs, it may cause cough, sore throat, congestion, and sometimes headache. If it settles in your stomach and intestinal tract, it may cause vomiting and diarrhea. Sometimes it causes vague symptoms like "aching all over," feeling tired, loss of appetite, or fever.  A viral illness usually lasts 1 to 2 weeks, but sometimes it lasts longer. In some cases, a more serious infection can look like a viral syndrome in the first few days of the illness. You may need another exam and additional tests to know the difference. Watch for the warning signs listed below.  Home care  Follow these guidelines for taking care of yourself at home:  · If symptoms are severe, rest at home for the first 2 to 3 days.  · Stay away from cigarette smoke - both your smoke and the smoke from others.  · You may use over-the-counter acetaminophen or ibuprofen for fever, muscle aching, and headache, unless another medicine was prescribed for this. If you have chronic liver or kidney disease or ever had a stomach ulcer or GI bleeding, talk with your doctor before using these medicines. No one who is younger than 18 and ill with a fever should take aspirin. It may cause severe disease or death.  · Your appetite may be poor, so a light diet is fine. Avoid dehydration by drinking 8 to 12 8-ounce " glasses of fluids each day. This may include water; orange juice; lemonade; apple, grape, and cranberry juice; clear fruit drinks; electrolyte replacement and sports drinks; and decaffeinated teas and coffee. If you have been diagnosed with a kidney disease, ask your doctor how much and what types of fluids you should drink to prevent dehydration. If you have kidney disease, drinking too much fluid can cause it build up in the your body and be dangerous to your health.  · Over-the-counter remedies won't shorten the length of the illness but may be helpful for cough, sore throat; and nasal and sinus congestion. Don't use decongestants if you have high blood pressure.  Follow-up care  Follow up with your healthcare provider if you do not improve over the next week.  Call 911  Get emergency medical care if any of the following occur:  · Convulsion  · Feeling weak, dizzy, or like you are going to faint  · Chest pain, shortness of breath, wheezing, or difficulty breathing  When to seek medical advice  Call your healthcare provider right away if any of these occur:  · Cough with lots of colored sputum (mucus) or blood in your sputum  · Chest pain, shortness of breath, wheezing, or difficulty breathing  · Severe headache; face, neck, or ear pain  · Severe, constant pain in the lower right side of your belly (abdominal)  · Continued vomiting (cant keep liquids down)  · Frequent diarrhea (more than 5 times a day); blood (red or black color) or mucus in diarrhea  · Feeling weak, dizzy, or like you are going to faint  · Extreme thirst  · Fever of 100.4°F (38°C) or higher, or as directed by your healthcare provider  Date Last Reviewed: 9/25/2015  © 6446-8795 [employer]. [address] [place] [address], [place] [address]. All rights reserved. This information is not intended as a substitute for professional medical care. Always follow your healthcare professional's instructions.

## 2018-12-17 NOTE — PROGRESS NOTES
Subjective:       Patient ID: Arpan Gamboa is a 53 y.o. male.    Vitals:  oral temperature is 98.8 °F (37.1 °C). His blood pressure is 120/68 and his pulse is 89. His respiration is 18 and oxygen saturation is 96%.     Chief Complaint: Sore Throat    Pt states he has been experiencing sinus problems for one week. Pt states he went to an urgent care on Saturday and was given a steroid shot and amoxicillin. Pt states he started taking the amoxicillin last night. Also taking flonase and claritin. Pt. States he is experiencing sore throat, ear pain, and sinus pressure.       Sinus Problem   This is a new problem. The current episode started in the past 7 days. The problem has been gradually worsening since onset. There has been no fever. Associated symptoms include congestion, ear pain, headaches, sinus pressure and a sore throat. Pertinent negatives include no chills, coughing or shortness of breath. Past treatments include antibiotics. The treatment provided mild relief.       Constitution: Negative for chills, fatigue and fever.   HENT: Positive for ear pain, congestion, sinus pressure and sore throat.    Neck: Negative for painful lymph nodes.   Cardiovascular: Negative for chest pain and leg swelling.   Eyes: Negative for double vision and blurred vision.   Respiratory: Negative for cough and shortness of breath.    Gastrointestinal: Negative for nausea, vomiting and diarrhea.   Genitourinary: Negative for dysuria, frequency and urgency.   Musculoskeletal: Negative for joint pain, joint swelling, muscle cramps and muscle ache.   Skin: Negative for color change, pale and rash.   Allergic/Immunologic: Negative for seasonal allergies.   Neurological: Positive for headaches. Negative for dizziness, history of vertigo, light-headedness and passing out.   Hematologic/Lymphatic: Negative for swollen lymph nodes, easy bruising/bleeding and history of blood clots. Does not bruise/bleed easily.    Psychiatric/Behavioral: Negative for nervous/anxious, sleep disturbance and depression. The patient is not nervous/anxious.        Objective:      Physical Exam   Constitutional: He is oriented to person, place, and time. He appears well-developed and well-nourished.   HENT:   Head: Normocephalic and atraumatic.   Right Ear: Hearing, tympanic membrane, external ear and ear canal normal. Tympanic membrane is not erythematous.   Left Ear: Hearing, tympanic membrane, external ear and ear canal normal. Tympanic membrane is not erythematous.   Nose: Nose normal. No mucosal edema or rhinorrhea. Right sinus exhibits no maxillary sinus tenderness and no frontal sinus tenderness. Left sinus exhibits no maxillary sinus tenderness and no frontal sinus tenderness.   Mouth/Throat: Uvula is midline, oropharynx is clear and moist and mucous membranes are normal. No posterior oropharyngeal edema or posterior oropharyngeal erythema.   Eyes: Conjunctivae, EOM and lids are normal. Right conjunctiva is not injected. Left conjunctiva is not injected.   Neck: Normal range of motion and full passive range of motion without pain. Neck supple.   Cardiovascular: Normal rate, regular rhythm, S1 normal, S2 normal, normal heart sounds and normal pulses.   Pulmonary/Chest: Effort normal and breath sounds normal. No respiratory distress. He has no decreased breath sounds. He has no wheezes.   Neurological: He is alert and oriented to person, place, and time.   Skin: Skin is warm and dry.   Nursing note and vitals reviewed.      Assessment:       1. Viral syndrome    2. Upper respiratory tract infection, unspecified type        Plan:         Viral syndrome  -     POCT rapid strep A  -     POCT Influenza A/B    Upper respiratory tract infection, unspecified type

## 2018-12-20 ENCOUNTER — TELEPHONE (OUTPATIENT)
Dept: URGENT CARE | Facility: CLINIC | Age: 53
End: 2018-12-20

## 2019-01-11 PROBLEM — R06.83 SNORING: Status: ACTIVE | Noted: 2019-01-11

## 2019-01-11 PROBLEM — J45.909 UNCOMPLICATED ASTHMA: Status: ACTIVE | Noted: 2019-01-11

## 2019-01-11 PROBLEM — R10.10 UPPER ABDOMINAL PAIN: Status: ACTIVE | Noted: 2019-01-11

## 2019-01-11 PROBLEM — R35.0 INCREASED FREQUENCY OF URINATION: Status: ACTIVE | Noted: 2019-01-11

## 2019-01-11 PROBLEM — N40.1 ENLARGED PROSTATE WITH LOWER URINARY TRACT SYMPTOMS (LUTS): Status: ACTIVE | Noted: 2019-01-11

## 2019-01-11 PROBLEM — J20.9 ACUTE BRONCHITIS, UNSPECIFIED: Status: ACTIVE | Noted: 2019-01-11

## 2019-01-11 PROBLEM — J30.1 ALLERGIC RHINITIS DUE TO POLLEN: Status: ACTIVE | Noted: 2019-01-11

## 2019-01-11 PROBLEM — I45.6 PRE-EXCITATION SYNDROME: Status: ACTIVE | Noted: 2019-01-11

## 2019-05-17 NOTE — PROGRESS NOTES
Subjective:       Patient ID: Arpan Gamboa is a 53 y.o. male with PSVT, Asthma, BPH, AMANDEEP who presents today to establish care, but has an acute issue with abdominal pain and diarrhea.  Will address his acute concerns     Chief Complaint: Abdominal Pain (Diarrhea)    HPI   Patient with complaints of abdominal pain and diarrhea - 5-6 days.  BM watery and 2-4 times per day.  Starting taking loperamide, simethicone, antacids, and probiotic.  Diarrhea improved, but still with gas.  Having a continuous mid-abdominal pain (above umbilicus) - was a 5/10 last week and today is a 2/10.  [place].  No change in diet.  Had fried catfish sandwich last week and had a mufaleta sandwich on Tuesday before his pains started on Wednesday.  Has been under much stress lately.  Had reflux 20 years ago - had EGD in past.  Lost 5 pounds in the last week.  No melena or hematochezia.  Had temp of 37.4 on the first day.  Had chills Saturday.   Has urinary frequency, but had this last year and treated with medication.  No dysuria.  Gets weak when he ingests gluten.    Patient denies n/v.  No chest pain or SOB.  No headaches or change in vision.  No dizziness.  No significant  weight gain or weight loss.  Remaining ROS negative.    Review of Systems   Constitutional: Positive for unexpected weight change. Negative for appetite change, diaphoresis and fatigue.   HENT: Negative for congestion, ear pain, hearing loss, rhinorrhea, sinus pressure, sore throat, trouble swallowing and voice change.    Eyes: Negative for photophobia, pain and visual disturbance.   Respiratory: Negative for cough, chest tightness, shortness of breath and wheezing.    Cardiovascular: Negative for chest pain, palpitations and leg swelling.   Gastrointestinal: Positive for abdominal pain and diarrhea. Negative for blood in stool, constipation, nausea and vomiting.   Endocrine: Negative for cold intolerance, heat intolerance, polydipsia,  polyphagia and polyuria.   Genitourinary: Negative for decreased urine volume, difficulty urinating, discharge, dysuria, flank pain, hematuria, scrotal swelling, testicular pain and urgency.   Musculoskeletal: Negative for arthralgias, back pain, myalgias and neck pain.   Skin: Negative for rash.   Neurological: Negative for dizziness, tremors, syncope, weakness, numbness and headaches.   Hematological: Does not bruise/bleed easily.   Psychiatric/Behavioral: Negative for agitation, confusion and sleep disturbance. The patient is not nervous/anxious.        Objective:      Physical Exam   Constitutional: He is oriented to person, place, and time. He appears well-developed and well-nourished.   HENT:   Head: Normocephalic.   Nose: Nose normal.   Mouth/Throat: Oropharynx is clear and moist.   Eyes: Pupils are equal, round, and reactive to light. Conjunctivae and EOM are normal. Right eye exhibits no discharge. Left eye exhibits no discharge. No scleral icterus.   Neck: Normal range of motion. Neck supple. No thyromegaly present.   Cardiovascular: Normal rate, regular rhythm, normal heart sounds and intact distal pulses. Exam reveals no gallop and no friction rub.   No murmur heard.  Pulmonary/Chest: Effort normal and breath sounds normal. No respiratory distress. He has no wheezes. He has no rales.   Abdominal: Soft. Bowel sounds are normal. He exhibits no distension and no mass. There is tenderness (taty-umbilical). There is no rebound and no guarding. No hernia.   Musculoskeletal: Normal range of motion. He exhibits no edema.   Lymphadenopathy:     He has no cervical adenopathy.   Neurological: He is alert and oriented to person, place, and time. No cranial nerve deficit or sensory deficit.   Skin: Skin is warm and dry. No rash noted. No erythema.   Psychiatric: He has a normal mood and affect. His behavior is normal. Thought content normal.       Assessment:       1. Encounter to establish care    2. PSVT  "(paroxysmal supraventricular tachycardia)    3. Mild intermittent asthma without complication    4. Obstructive sleep apnea syndrome    5. Generalized abdominal pain    6. Diarrhea, unspecified type        Plan:   -Today's Visit - Abdominal Pain with Diarrhea - Patient with complaints of abdominal pain and diarrhea - 5-6 days.  BM watery and 2-4 times per day.  Starting taking loperamide, simethicone, antacids, and probiotic.  Diarrhea improved, but still with gas.  Having a continuous mid-abdominal pain (above umbilicus) - was a 5/10 last week and today is a 2/10.  Last out of the country 3 years ago.  No change in diet.  Had fried catfish sandwich last week and had a mufaleta sandwich on Tuesday before his pains started on Wednesday.  Has been under much stress lately.  Had reflux 20 years ago - had EGD in past.  Lost 5 pounds in the last week.  No melena or hematochezia.  Had temp of 37.4 on the first day.  Had chills Saturday.   Has urinary frequency, but had this last year and treated with medication.  No dysuria.  Gets weak when he ingests gluten.  No recent antibiotic use.    Exam with taty-umbilical tenderness with no rebound.  Will check CMP, CBC, Amylase/Lipase, H. Pylori, Celiac Panel, Stool culture, Giardia.  Will check CT of Abd-Pelvis.    Stop Loperamide.  Start protonix 20mg once a day.    I will [facility] to [facility] for further evaluation if pain does not improve.    Follow up in once week.      ------------------------------------------------------------    -Cards - PSVT/WPW - last seen by Cardiology at [facility] ([place], [place]) on 1/10/2019 - "  He presents with paroxysmal tachycardia > 200 bpm since the age of 16 which occurs rarely (about 4 times yearly), but which is very symptomatic. He was told as a child that he had WPW, however neither today's EKG nor the EKG and Holter from 3/2017 show evidence of pre-excitation. No strip of the arrhythmia is available, but overall the clinical " "story is most suggestive of an SVT, likely AVNRT; ORT via a concealed accessory pathway remains in the differential as do AT, AF, AFL and VT, although these are less likely.  We discussed that capturing these rare episodes of tachycardia on a monitor is difficult, and so we proposed two reasonable management options:  1) Proceed to EP study +/- ablation  2) Trial of medical therapy (calcium channel blocker vs. Beta blocker).  After a lengthy discussion of the risks/benefits of each approach, the Mr. Gamboa and his wife preferred to start with medical therapy. They will see how he responds to and tolerates the medication, and consider EPS +/- ablation in the future if there is an inadequate response or medication intolerance.  --Start verapamil SR 120mg every evening; we discussed side effects such as orthostatic hypotension, bradycardia, constipation  --Continue to use vagal maneuvers to abort any SVT episodes  --The patient will try to obtain an EKG of the arrhythmia from his 2007 [place] hospitalization  --He will consider EPS +/- ablation for SVT  --Follow up in 1 year or sooner if needed".    BP today is good at 120/68.    Check Fasting Labs.    -Pulmonary - Mild Intermittent Asthma and AMANDEEP -  Seen by Sleep on 7/24/2017 - "AMANDEEP, severe, supine worse. 7/24/17: Adherent with apap, benefiting from therapy. Residual elevated mild ahi could be from leak or pressure adjustment.Symptoms improved  He has medical co-morbidities of moderate retrognathia. This warrants treatment for obstructive sleep apnea.  Continue APAP, ajdust today 7-14cm. Continue to improve mask on time/sleep time. Avoid mask leaks  Discussed etiology of AMANDEEP and potential ramifications of untreated AMANDEEP, including heart disease, hypertension, cognitive difficulties, stroke, and diabetes. AHI>15 associated with increased cardiovascular risk".     -ENT - Nasal Congestion - seen by ENT on 11/19/2018 - "In summary, Mr. Gamboa is a 53 year old male with " "increased nasal congestion and pain. Exam shows dry nasal mucosa, likely from CPAP use. Recommend addition of nasal saline for improved mucociliary clearance. Vaseline at night as tolerated. RTC if symptoms fail to improve or worsen".     -Derm - Seborrheic Dermatitis - Seen by Derm on 11/9/2018 -"-     ketoconazole (NIZORAL) 2 % shampoo; Lather scalp for 5-10 min, then rinse. Use 2-3 times per week.  Benign-appearing nevi on exam today. Counseled patient to monitor mole(s) and return to clinic if any changes in size, shape, or color are noted or if it becomes symptomatic (bleeding, itching, pain, etc)".    -MSK - Right Knee Pain - Seen by Ortho on 8/27/2018 - "Given extreme dysfunction and discomfort, coupled with physical examination suggesting meniscal pathology and non-diagnostic x-ray imaging, we will obtain MRI imaging for further evaluation of the soft tissue structures of the knee".     -GI - Discussed Screening Colonoscopy - will refer to [facility].    - - BPH - We discussed Prostate cancer screening - Check PSA.  We discussed STD screening.    Seen by Urology on 6/26/2017 -"-A post void residual bladder scan was performed immediately after voiding. The patient's PVR was noted to be 47mL.  -Avoid bladder irritants including but not limited to caffeine, alcohol, smoking, spicy foods, acidic foods, tomato-based products, citrus, artificial sweeteners, chocolate, coffee or tea.  -prostate meds: none; offered flomax.  We will hold off for right now".      -HCM - Discussed Flu and Tdap Vaccines.  We discussed Shingles (waiting for Shingrix Availability) and Pneumococcal (23 and 13) vaccinations.      -Follow Up - 6 months  "

## 2019-05-20 ENCOUNTER — PATIENT MESSAGE (OUTPATIENT)
Dept: PRIMARY CARE CLINIC | Facility: CLINIC | Age: 54
End: 2019-05-20

## 2019-05-20 ENCOUNTER — OFFICE VISIT (OUTPATIENT)
Dept: PRIMARY CARE CLINIC | Facility: CLINIC | Age: 54
End: 2019-05-20
Payer: COMMERCIAL

## 2019-05-20 ENCOUNTER — LAB VISIT (OUTPATIENT)
Dept: LAB | Facility: HOSPITAL | Age: 54
End: 2019-05-20
Attending: INTERNAL MEDICINE
Payer: COMMERCIAL

## 2019-05-20 VITALS
HEART RATE: 87 BPM | BODY MASS INDEX: 24.24 KG/M2 | SYSTOLIC BLOOD PRESSURE: 120 MMHG | WEIGHT: 179 LBS | OXYGEN SATURATION: 97 % | HEIGHT: 72 IN | DIASTOLIC BLOOD PRESSURE: 68 MMHG

## 2019-05-20 DIAGNOSIS — R19.7 DIARRHEA, UNSPECIFIED TYPE: ICD-10-CM

## 2019-05-20 DIAGNOSIS — G47.33 OBSTRUCTIVE SLEEP APNEA SYNDROME: ICD-10-CM

## 2019-05-20 DIAGNOSIS — I47.10 PSVT (PAROXYSMAL SUPRAVENTRICULAR TACHYCARDIA): ICD-10-CM

## 2019-05-20 DIAGNOSIS — R10.84 GENERALIZED ABDOMINAL PAIN: ICD-10-CM

## 2019-05-20 DIAGNOSIS — J45.20 MILD INTERMITTENT ASTHMA WITHOUT COMPLICATION: ICD-10-CM

## 2019-05-20 DIAGNOSIS — Z76.89 ENCOUNTER TO ESTABLISH CARE: Primary | ICD-10-CM

## 2019-05-20 LAB
ALBUMIN SERPL BCP-MCNC: 3.8 G/DL (ref 3.5–5.2)
ALP SERPL-CCNC: 77 U/L (ref 55–135)
ALT SERPL W/O P-5'-P-CCNC: 49 U/L (ref 10–44)
AMYLASE SERPL-CCNC: 88 U/L (ref 20–110)
ANION GAP SERPL CALC-SCNC: 11 MMOL/L (ref 8–16)
AST SERPL-CCNC: 32 U/L (ref 10–40)
BILIRUB SERPL-MCNC: 0.5 MG/DL (ref 0.1–1)
BUN SERPL-MCNC: 13 MG/DL (ref 6–20)
CALCIUM SERPL-MCNC: 9.2 MG/DL (ref 8.7–10.5)
CHLORIDE SERPL-SCNC: 105 MMOL/L (ref 95–110)
CO2 SERPL-SCNC: 25 MMOL/L (ref 23–29)
CREAT SERPL-MCNC: 0.8 MG/DL (ref 0.5–1.4)
EST. GFR  (AFRICAN AMERICAN): >60 ML/MIN/1.73 M^2
EST. GFR  (NON AFRICAN AMERICAN): >60 ML/MIN/1.73 M^2
GLUCOSE SERPL-MCNC: 69 MG/DL (ref 70–110)
LIPASE SERPL-CCNC: 19 U/L (ref 4–60)
POTASSIUM SERPL-SCNC: 3.9 MMOL/L (ref 3.5–5.1)
PROT SERPL-MCNC: 7.2 G/DL (ref 6–8.4)
SODIUM SERPL-SCNC: 141 MMOL/L (ref 136–145)
T4 FREE SERPL-MCNC: 1.05 NG/DL (ref 0.71–1.51)
TSH SERPL DL<=0.005 MIU/L-ACNC: 0.36 UIU/ML (ref 0.4–4)

## 2019-05-20 PROCEDURE — 99999 PR PBB SHADOW E&M-EST. PATIENT-LVL III: ICD-10-PCS | Mod: PBBFAC,,, | Performed by: INTERNAL MEDICINE

## 2019-05-20 PROCEDURE — 84439 ASSAY OF FREE THYROXINE: CPT

## 2019-05-20 PROCEDURE — 3008F PR BODY MASS INDEX (BMI) DOCUMENTED: ICD-10-PCS | Mod: CPTII,S$GLB,, | Performed by: INTERNAL MEDICINE

## 2019-05-20 PROCEDURE — 99214 OFFICE O/P EST MOD 30 MIN: CPT | Mod: S$GLB,,, | Performed by: INTERNAL MEDICINE

## 2019-05-20 PROCEDURE — 83690 ASSAY OF LIPASE: CPT

## 2019-05-20 PROCEDURE — 3008F BODY MASS INDEX DOCD: CPT | Mod: CPTII,S$GLB,, | Performed by: INTERNAL MEDICINE

## 2019-05-20 PROCEDURE — 84443 ASSAY THYROID STIM HORMONE: CPT

## 2019-05-20 PROCEDURE — 83516 IMMUNOASSAY NONANTIBODY: CPT | Mod: 59

## 2019-05-20 PROCEDURE — 99214 PR OFFICE/OUTPT VISIT, EST, LEVL IV, 30-39 MIN: ICD-10-PCS | Mod: S$GLB,,, | Performed by: INTERNAL MEDICINE

## 2019-05-20 PROCEDURE — 82150 ASSAY OF AMYLASE: CPT

## 2019-05-20 PROCEDURE — 80053 COMPREHEN METABOLIC PANEL: CPT

## 2019-05-20 PROCEDURE — 99999 PR PBB SHADOW E&M-EST. PATIENT-LVL III: CPT | Mod: PBBFAC,,, | Performed by: INTERNAL MEDICINE

## 2019-05-20 RX ORDER — VERAPAMIL HYDROCHLORIDE 120 MG/1
120 CAPSULE, EXTENDED RELEASE ORAL DAILY
COMMUNITY
End: 2019-09-07

## 2019-05-20 RX ORDER — PANTOPRAZOLE SODIUM 20 MG/1
20 TABLET, DELAYED RELEASE ORAL DAILY
Qty: 30 TABLET | Refills: 2 | Status: SHIPPED | OUTPATIENT
Start: 2019-05-20 | End: 2019-08-15 | Stop reason: SDUPTHER

## 2019-05-20 NOTE — PATIENT INSTRUCTIONS
For your abdominal pain and diarrhea - I would like to order symptoms specific non-fasting labs (blood, urine, stool).  I will order a CT of Abd-pelvis.    Stop the loperamide for now.  Start Protonix 20mg once a day for now.  If any worsening of pain, please go to the ED for further evaluation.    I will refer you to [facility] for a screening colonoscopy.    Return in 1 week - sooner if needed.  Please come at least 15-20 minutes before your scheduled appointment time.

## 2019-05-22 ENCOUNTER — PATIENT MESSAGE (OUTPATIENT)
Dept: PRIMARY CARE CLINIC | Facility: CLINIC | Age: 54
End: 2019-05-22

## 2019-05-22 ENCOUNTER — HOSPITAL ENCOUNTER (OUTPATIENT)
Dept: RADIOLOGY | Facility: OTHER | Age: 54
Discharge: HOME | End: 2019-05-22
Attending: INTERNAL MEDICINE
Payer: COMMERCIAL

## 2019-05-22 DIAGNOSIS — R10.84 GENERALIZED ABDOMINAL PAIN: ICD-10-CM

## 2019-05-22 DIAGNOSIS — R19.7 DIARRHEA, UNSPECIFIED TYPE: ICD-10-CM

## 2019-05-22 LAB
GLIADIN PEPTIDE IGA SER-ACNC: 6 UNITS
GLIADIN PEPTIDE IGG SER-ACNC: 4 UNITS
IGA SERPL-MCNC: 413 MG/DL (ref 70–400)
TTG IGA SER-ACNC: 7 UNITS
TTG IGG SER-ACNC: 3 UNITS

## 2019-05-22 PROCEDURE — 74177 CT ABD & PELVIS W/CONTRAST: CPT | Mod: 26,,, | Performed by: RADIOLOGY

## 2019-05-22 PROCEDURE — 74177 CT ABDOMEN PELVIS WITH CONTRAST: ICD-10-PCS | Mod: 26,,, | Performed by: RADIOLOGY

## 2019-05-22 PROCEDURE — 25500020 PHARM REV CODE 255: Performed by: INTERNAL MEDICINE

## 2019-05-22 PROCEDURE — 74177 CT ABD & PELVIS W/CONTRAST: CPT | Mod: TC

## 2019-05-22 RX ADMIN — IOHEXOL 100 ML: 350 INJECTION, SOLUTION INTRAVENOUS at 02:05

## 2019-05-22 RX ADMIN — IOHEXOL 30 ML: 350 INJECTION, SOLUTION INTRAVENOUS at 01:05

## 2019-05-23 NOTE — PROGRESS NOTES
Subjective:       Patient ID: Arpan Gamboa is a 53 y.o. male with PSVT, Asthma, BPH, AMANDEEP who was seen initially on 5/20/2019.    Chief Complaint: Follow-up (Abdominal)    HPI     Patient overall feeling better.  Still with some abdominal discomfort, but much improved.  He has an appointment with [facility] in June.  He also has an appointment with his wife's GI (Dr. Tyson) doctor today - he will see his Fellow (Dr. Islas).    Patient denies n/v.  No chest pain or SOB.  No headaches or change in vision.  No dizziness.  No significant  weight gain or weight loss.  Remaining ROS negative.    Review of Systems   Constitutional: Negative for appetite change, diaphoresis, fatigue and unexpected weight change.   HENT: Negative for congestion, ear pain, hearing loss, rhinorrhea, sinus pressure, sore throat, trouble swallowing and voice change.    Eyes: Negative for photophobia, pain and visual disturbance.   Respiratory: Negative for cough, chest tightness, shortness of breath and wheezing.    Cardiovascular: Negative for chest pain, palpitations and leg swelling.   Gastrointestinal: Negative for abdominal pain, blood in stool, constipation, diarrhea, nausea and vomiting.   Endocrine: Negative for cold intolerance, heat intolerance, polydipsia, polyphagia and polyuria.   Genitourinary: Negative for decreased urine volume, difficulty urinating, discharge, dysuria, flank pain, hematuria, scrotal swelling, testicular pain and urgency.   Musculoskeletal: Negative for arthralgias, back pain, myalgias and neck pain.   Skin: Negative for rash.   Neurological: Negative for dizziness, tremors, syncope, weakness, numbness and headaches.   Hematological: Does not bruise/bleed easily.   Psychiatric/Behavioral: Negative for agitation, confusion and sleep disturbance. The patient is not nervous/anxious.        Objective:      Physical Exam   Constitutional: He is oriented to person, place, and time. He appears well-developed and  well-nourished.   HENT:   Head: Normocephalic.   Nose: Nose normal.   Mouth/Throat: Oropharynx is clear and moist.   Eyes: Pupils are equal, round, and reactive to light. Conjunctivae and EOM are normal. Right eye exhibits no discharge. Left eye exhibits no discharge. No scleral icterus.   Neck: Normal range of motion. Neck supple. No thyromegaly present.   Cardiovascular: Normal rate, regular rhythm, normal heart sounds and intact distal pulses. Exam reveals no gallop and no friction rub.   No murmur heard.  Pulmonary/Chest: Effort normal and breath sounds normal. No respiratory distress. He has no wheezes. He has no rales.   Abdominal: Soft. Bowel sounds are normal. He exhibits no distension and no mass. There is no tenderness. There is no rebound and no guarding. No hernia.   Musculoskeletal: Normal range of motion. He exhibits no edema.   Lymphadenopathy:     He has no cervical adenopathy.   Neurological: He is alert and oriented to person, place, and time. No cranial nerve deficit or sensory deficit.   Skin: Skin is warm and dry. No rash noted. No erythema.   Psychiatric: He has a normal mood and affect. His behavior is normal. Thought content normal.       Assessment:       1. Upper abdominal pain    2. PSVT (paroxysmal supraventricular tachycardia)    3. Mild intermittent asthma without complication    4. Obstructive sleep apnea syndrome    5. Annual physical exam    6. Prostate cancer screening        Plan:   -Today's Visit - patient is awake and alert today.    -----------------------------------------------------  -Last Visit 5/20/2019 - Abdominal Pain with Diarrhea - Patient with complaints of abdominal pain and diarrhea - 5-6 days.  BM watery and 2-4 times per day.  Starting taking loperamide, simethicone, antacids, and probiotic.  Diarrhea improved, but still with gas.  Having a continuous mid-abdominal pain (above umbilicus) - was a 5/10 last week and today is a 2/10.  [place] out of the country 3 years  "ago.  No change in diet.  Had fried catfish sandwich last week and had a mufaleta sandwich on Tuesday before his pains started on Wednesday.  Has been under much stress lately.  Had reflux 20 years ago - had EGD in past.  Lost 5 pounds in the last week.  No melena or hematochezia.  Had temp of 37.4 on the first day.  Had chills Saturday.   Has urinary frequency, but had this last year and treated with medication.  No dysuria.  Gets weak when he ingests gluten.  No recent antibiotic use.  Exam with taty-umbilical tenderness with no rebound.  Recommended to Stop Loperamide and   Start protonix 20mg once a day.    CMP with elevated ALT at 49 only, Amylase/Lipase normal, H. Pylori not done, Celiac Panel negative, Stool culture and Giardia not done.   TSH low at 0.355, but fT4 normal.  Repeat in 3-6 months.  CT of Abd-Pelvis 5/22/2019 with No acute process or significant abnormality seen.  Liver lesions most likely cysts.    Repeat Hepatic panel and consider further work-up.    Patient overall feeling better.  Still with some abdominal discomfort, but much improved.  Exam with no tenderness today.  Continue Protonix for now.  He has an appointment with [facility] in June.  He also has an appointment with his wife's GI (Dr. Tyson) doctor today - he will see his Fellow (Dr. Islas).  -----------------------------------------------------------    -Cards - PSVT/WPW - last seen by Cardiology at [facility] ([place], [place]) on 1/10/2019 - "  He presents with paroxysmal tachycardia > 200 bpm since the age of 16 which occurs rarely (about 4 times yearly), but which is very symptomatic. He was told as a child that he had WPW, however neither today's EKG nor the EKG and Holter from 3/2017 show evidence of pre-excitation. No strip of the arrhythmia is available, but overall the clinical story is most suggestive of an SVT, likely AVNRT; ORT via a concealed accessory pathway remains in the differential as do AT, AF, AFL and VT, " "although these are less likely.  We discussed that capturing these rare episodes of tachycardia on a monitor is difficult, and so we proposed two reasonable management options:  1) Proceed to EP study +/- ablation  2) Trial of medical therapy (calcium channel blocker vs. Beta blocker).  After a lengthy discussion of the risks/benefits of each approach, the Mr. Gamboa and his wife preferred to start with medical therapy. They will see how he responds to and tolerates the medication, and consider EPS +/- ablation in the future if there is an inadequate response or medication intolerance.  --Start verapamil SR 120mg every evening; we discussed side effects such as orthostatic hypotension, bradycardia, constipation  --Continue to use vagal maneuvers to abort any SVT episodes  --The patient will try to obtain an EKG of the arrhythmia from his 2007 [place] hospitalization  --He will consider EPS +/- ablation for SVT  --Follow up in 1 year or sooner if needed".    BP today is good at 120/68.    Check Fasting Labs.    -Pulmonary - Mild Intermittent Asthma and AMANDEEP -  Seen by Sleep on 7/24/2017 - "AMANDEEP, severe, supine worse. 7/24/17: Adherent with apap, benefiting from therapy. Residual elevated mild ahi could be from leak or pressure adjustment.Symptoms improved  He has medical co-morbidities of moderate retrognathia. This warrants treatment for obstructive sleep apnea.  Continue APAP, ajdust today 7-14cm. Continue to improve mask on time/sleep time. Avoid mask leaks  Discussed etiology of AMANDEEP and potential ramifications of untreated AMANDEEP, including heart disease, hypertension, cognitive difficulties, stroke, and diabetes. AHI>15 associated with increased cardiovascular risk".     He wants to see an allergist, as he always need to take Claritin and wants to knwo what his allergies are.    -ENT - Nasal Congestion - seen by ENT on 11/19/2018 - "In summary, Mr. Gamboa is a 53 year old male with increased nasal congestion and " "pain. Exam shows dry nasal mucosa, likely from CPAP use. Recommend addition of nasal saline for improved mucociliary clearance. Vaseline at night as tolerated. RTC if symptoms fail to improve or worsen".     -Derm - Seborrheic Dermatitis - Seen by Derm on 11/9/2018 -"-     ketoconazole (NIZORAL) 2 % shampoo; Lather scalp for 5-10 min, then rinse. Use 2-3 times per week.  Benign-appearing nevi on exam today. Counseled patient to monitor mole(s) and return to clinic if any changes in size, shape, or color are noted or if it becomes symptomatic (bleeding, itching, pain, etc)".    -MSK - Right Knee Pain - Seen by Ortho on 8/27/2018 - "Given extreme dysfunction and discomfort, coupled with physical examination suggesting meniscal pathology and non-diagnostic x-ray imaging, we will obtain MRI imaging for further evaluation of the soft tissue structures of the knee".     He wants a second opinion.  Will refer to another orthopedist.    -GI - Discussed Screening Colonoscopy - will refer to [facility].  See above.    -Endocrine - TSH low at 0.355, but fT4 normal in 5/2019.   Repeat in 3-6 months.    - - BPH - We discussed Prostate cancer screening - Check PSA.  We discussed STD screening.    Seen by Urology on 6/26/2017 -"-A post void residual bladder scan was performed immediately after voiding. The patient's PVR was noted to be 47mL.  -Avoid bladder irritants including but not limited to caffeine, alcohol, smoking, spicy foods, acidic foods, tomato-based products, citrus, artificial sweeteners, chocolate, coffee or tea.  -prostate meds: none; offered flomax.  We will hold off for right now".     -Psych - complains of stress and is affecting sleeping, work, and relationships.  Will refer to Psychotherapist     -HCM - Discussed Flu and Tdap (he thinks he had it in 1/2019 as part of his Green Card) Vaccines.  We discussed Shingles (Recommend to get on a waiting list) and Pneumococcal (23 and 13) vaccinations.      -Follow Up - 6 " months

## 2019-05-27 ENCOUNTER — OFFICE VISIT (OUTPATIENT)
Dept: PRIMARY CARE CLINIC | Facility: CLINIC | Age: 54
End: 2019-05-27
Payer: COMMERCIAL

## 2019-05-27 ENCOUNTER — OFFICE VISIT (OUTPATIENT)
Dept: GASTROENTEROLOGY | Facility: CLINIC | Age: 54
End: 2019-05-27
Payer: COMMERCIAL

## 2019-05-27 VITALS
HEART RATE: 79 BPM | BODY MASS INDEX: 24.91 KG/M2 | HEIGHT: 71 IN | WEIGHT: 177.94 LBS | SYSTOLIC BLOOD PRESSURE: 106 MMHG | DIASTOLIC BLOOD PRESSURE: 60 MMHG

## 2019-05-27 VITALS
HEART RATE: 77 BPM | BODY MASS INDEX: 24.37 KG/M2 | DIASTOLIC BLOOD PRESSURE: 57 MMHG | WEIGHT: 179.69 LBS | OXYGEN SATURATION: 97 % | SYSTOLIC BLOOD PRESSURE: 118 MMHG

## 2019-05-27 DIAGNOSIS — M25.561 CHRONIC PAIN OF RIGHT KNEE: ICD-10-CM

## 2019-05-27 DIAGNOSIS — R79.89 ELEVATED LFTS: ICD-10-CM

## 2019-05-27 DIAGNOSIS — Z88.9 H/O MULTIPLE ALLERGIES: ICD-10-CM

## 2019-05-27 DIAGNOSIS — K52.9 GASTROENTERITIS: Primary | ICD-10-CM

## 2019-05-27 DIAGNOSIS — I47.10 PSVT (PAROXYSMAL SUPRAVENTRICULAR TACHYCARDIA): ICD-10-CM

## 2019-05-27 DIAGNOSIS — R10.9 ABDOMINAL PAIN, UNSPECIFIED ABDOMINAL LOCATION: ICD-10-CM

## 2019-05-27 DIAGNOSIS — J45.20 MILD INTERMITTENT ASTHMA WITHOUT COMPLICATION: ICD-10-CM

## 2019-05-27 DIAGNOSIS — R19.7 DIARRHEA, UNSPECIFIED TYPE: ICD-10-CM

## 2019-05-27 DIAGNOSIS — Z00.00 ANNUAL PHYSICAL EXAM: ICD-10-CM

## 2019-05-27 DIAGNOSIS — Z12.11 SCREENING FOR COLORECTAL CANCER: ICD-10-CM

## 2019-05-27 DIAGNOSIS — Z12.5 PROSTATE CANCER SCREENING: ICD-10-CM

## 2019-05-27 DIAGNOSIS — G47.33 OBSTRUCTIVE SLEEP APNEA SYNDROME: ICD-10-CM

## 2019-05-27 DIAGNOSIS — F41.9 ANXIETY: ICD-10-CM

## 2019-05-27 DIAGNOSIS — Z12.12 SCREENING FOR COLORECTAL CANCER: ICD-10-CM

## 2019-05-27 DIAGNOSIS — R10.10 UPPER ABDOMINAL PAIN: Primary | ICD-10-CM

## 2019-05-27 DIAGNOSIS — G89.29 CHRONIC PAIN OF RIGHT KNEE: ICD-10-CM

## 2019-05-27 PROCEDURE — 99999 PR PBB SHADOW E&M-EST. PATIENT-LVL III: CPT | Mod: PBBFAC,,, | Performed by: INTERNAL MEDICINE

## 2019-05-27 PROCEDURE — 99999 PR PBB SHADOW E&M-EST. PATIENT-LVL III: ICD-10-PCS | Mod: PBBFAC,,, | Performed by: INTERNAL MEDICINE

## 2019-05-27 PROCEDURE — 99999 PR PBB SHADOW E&M-EST. PATIENT-LVL V: CPT | Mod: PBBFAC,,, | Performed by: INTERNAL MEDICINE

## 2019-05-27 PROCEDURE — 99999 PR PBB SHADOW E&M-EST. PATIENT-LVL V: ICD-10-PCS | Mod: PBBFAC,,, | Performed by: INTERNAL MEDICINE

## 2019-05-27 PROCEDURE — 3008F BODY MASS INDEX DOCD: CPT | Mod: CPTII,S$GLB,, | Performed by: INTERNAL MEDICINE

## 2019-05-27 PROCEDURE — 99204 PR OFFICE/OUTPT VISIT, NEW, LEVL IV, 45-59 MIN: ICD-10-PCS | Mod: S$GLB,,, | Performed by: INTERNAL MEDICINE

## 2019-05-27 PROCEDURE — 99214 PR OFFICE/OUTPT VISIT, EST, LEVL IV, 30-39 MIN: ICD-10-PCS | Mod: S$GLB,,, | Performed by: INTERNAL MEDICINE

## 2019-05-27 PROCEDURE — 3008F PR BODY MASS INDEX (BMI) DOCUMENTED: ICD-10-PCS | Mod: CPTII,S$GLB,, | Performed by: INTERNAL MEDICINE

## 2019-05-27 PROCEDURE — 99214 OFFICE O/P EST MOD 30 MIN: CPT | Mod: S$GLB,,, | Performed by: INTERNAL MEDICINE

## 2019-05-27 PROCEDURE — 99204 OFFICE O/P NEW MOD 45 MIN: CPT | Mod: S$GLB,,, | Performed by: INTERNAL MEDICINE

## 2019-05-27 NOTE — LETTER
May 27, 2019      [facility]  [address] [facility] [address]  Phone: [phone]  Fax: [phone]       Patient: Arpan Gamboa   YOB: 1965  Date of Visit: 05/27/2019    To Whom It May Concern:    Danette Gamboa  was at [facility] on 05/27/2019. He may return to work on 05/28/19 with no restrictions. If you have any questions or concerns, or if I can be of further assistance, please do not hesitate to contact me.    Sincerely,    Dr. Garcia

## 2019-05-27 NOTE — PATIENT INSTRUCTIONS
Your exam was overall normal today.    Your blood pressure was good.    I will order routine fasting labs today - at least 9 hours of fasting.    I recommend getting on a waiting list at [facility] for the Shingle Vaccine (Shingrix).    I will refer you to an Allergist.  I will refer you to Orthopedics.  I will refer you to Psychology.    Return in 6 months - sooner if needed.  Please come at least 15-20 minutes before your scheduled appointment time.

## 2019-05-27 NOTE — PROGRESS NOTES
GASTROENTEROLOGY CLINIC NOTE    Reason for visit: The primary encounter diagnosis was Gastroenteritis. Diagnoses of Diarrhea, unspecified type, Abdominal pain, unspecified abdominal location, and Screening for colorectal cancer were also pertinent to this visit.  Referring provider/PCP: Dinesh Meza MD    CC: epigastric pain, diarrhea.    HPI:  Arpan Gamboa is a 53 y.o. male with here for new patient visit.  He was Ok until May 14, he started to have intense pain, around mid abdomen, around 8pm, feeling week, went to sleep, woke up 2 AM with diarrhea. Had dining at outside seafood that night. Started taking Loperamide and probiotic. Took time off from work because of the pain and diarrhea. Felt also fever, chills, up to 37.5F. Diarrhea improved by the weekend with loperamide. Diarrhea followed by constipation. Weakness improved. Abdominal pain improved. Returned back to work Tuesday. During the diarrhea episode, had excess bloating, gas and belching. No nausea or vomiting.    ABx - no recent abx use. Last use December 2018 for URI.   Travel - not recent, 3 years ago.  New medication - verapamil    Bowel movements now back to normal. Once or twice daily formed.     Abdominal pain is improving but not resolved completely.     CT scan done with PCP- no acute pathology. Celiac serology and TSH negative.     20 years ago - some stomach pain, reflux and told to have gastritis/esophagitis at [facility].     7-10 years ago colonoscopy as per patient - diarrhea at that time. No polyps.    Works for [employer]. Under a lot of stress. No family h/o CRC. Lives with wife.     Prior GI studies:  No reports available.     Review of Systems   Constitutional: Negative for chills, fever and weight loss.   HENT: Negative for sore throat.    Eyes: Negative for blurred vision and double vision.   Respiratory: Negative for cough, hemoptysis and stridor.    Cardiovascular: Negative for chest pain and palpitations.    Gastrointestinal: Positive for abdominal pain and diarrhea. Negative for blood in stool, constipation, heartburn, melena, nausea and vomiting.   Genitourinary: Negative for dysuria and flank pain.   Musculoskeletal: Negative for joint pain.   Skin: Negative for rash.   Neurological: Negative for dizziness and seizures.   Endo/Heme/Allergies: Negative for polydipsia.   Psychiatric/Behavioral: Negative for depression and suicidal ideas.       Past Medical History: has a past medical history of Allergy, Asthma, Sleep apnea, and Tachycardia.    Past Surgical History: has a past surgical history that includes hernia repair at 3 yrs old.    Family History:family history includes COPD in his father; Hypertension in his mother; Lupus in his sister; Prostatitis in his father.    Allergies:   Review of patient's allergies indicates:   Allergen Reactions    Dog hair standardized allergenic extract        Social History: reports that he has never smoked. He has never used smokeless tobacco. He reports that he drinks alcohol.    Home medications:   Current Outpatient Medications on File Prior to Visit   Medication Sig Dispense Refill    ketoconazole (NIZORAL) 2 % shampoo Lather scalp for 5-10 min, then rinse. Use 2-3 times per week. 240 mL 3    loratadine (CLARITIN) 10 mg tablet Take 10 mg by mouth daily as needed for Allergies.      oxybutynin (DITROPAN XL) 10 MG 24 hr tablet Take 10 mg by mouth once daily.      pantoprazole (PROTONIX) 20 MG tablet Take 1 tablet (20 mg total) by mouth once daily. 30 tablet 2    triamcinolone acetonide 0.1% (KENALOG) 0.1 % cream Apply to affected areas of SCALP bid prn itching/flaking. 80 g 0    albuterol (VENTOLIN HFA) 90 mcg/actuation inhaler Inhale 2 puffs into the lungs daily as needed.      verapamil (VERELAN) 120 MG C24P Take 120 mg by mouth once daily.       No current facility-administered medications on file prior to visit.        Vital signs:  /60   Pulse 79   Ht 5'  "11" (1.803 m)   Wt 80.7 kg (177 lb 14.6 oz)   BMI 24.81 kg/m²     Physical Exam   Constitutional: He is oriented to person, place, and time. He appears well-developed.   HENT:   Head: Normocephalic and atraumatic.   Eyes: No scleral icterus.   Neck: Neck supple.   Cardiovascular: Normal rate. Exam reveals no gallop and no friction rub.   Abdominal: Soft. Bowel sounds are normal. He exhibits no distension and no mass. There is no tenderness. There is no rebound and no guarding. No hernia.   Musculoskeletal: He exhibits no edema or tenderness.   Neurological: He is alert and oriented to person, place, and time.   Skin: Skin is warm.   Psychiatric: He has a normal mood and affect. His behavior is normal.   Vitals reviewed.      Routine labs:  No results found for: WBC, HGB, HCT, MCV, PLT  No results found for: INR  No results found for: IRON, FERRITIN, TIBC, FESATURATED  Lab Results   Component Value Date     05/20/2019    K 3.9 05/20/2019     05/20/2019    CO2 25 05/20/2019    BUN 13 05/20/2019    CREATININE 0.8 05/20/2019     Lab Results   Component Value Date    ALBUMIN 3.8 05/20/2019    ALT 49 (H) 05/20/2019    AST 32 05/20/2019    ALKPHOS 77 05/20/2019    BILITOT 0.5 05/20/2019     No results found for: GLUCOSE    Imaging:  As noted in HPI.    Assessment:  Arpan Gamboa is a 53 y.o. male with     1. Gastroenteritis    2. Diarrhea, unspecified type    3. Abdominal pain, unspecified abdominal location    4. Screening for colorectal cancer      Likely acute gastroenteritis, bacterial or viral. Reassured.     Plan:  Orders Placed This Encounter    Case request GI: COLONOSCOPY     He is due for screening colonoscopy. We will schedule this after resolution of gastroenteritis. Likely in 2-3 months.     Venu Beyer MD  Gastroenterology & Hepatology Fellow  Pager: [phone]      "

## 2019-06-06 ENCOUNTER — TELEPHONE (OUTPATIENT)
Dept: INTERNAL MEDICINE | Facility: CLINIC | Age: 54
End: 2019-06-06

## 2019-06-16 ENCOUNTER — PATIENT MESSAGE (OUTPATIENT)
Dept: GASTROENTEROLOGY | Facility: CLINIC | Age: 54
End: 2019-06-16

## 2019-08-11 NOTE — PROGRESS NOTES
"                            Physical Therapy Daily Treatment Note     Name: Arpan Gamboa  Clinic Number: [id number]    Therapy Diagnosis:   Encounter Diagnosis   Name Primary?    Right medial knee pain      Physician: Win Rahman, *    Visit Date: 8/1/2018  Physician Orders: PT Eval and Treat   Medical Diagnosis: Right knee pain  Evaluation Date: 6/27/2018  Authorization Period Expiration: 12/31/2018  Plan of Care Certification Period: 10/17/2018  Precautions: None  Date of Surgery: NA  Visit # / Visits authorized: 8/20    Time In: 705a  Time Out: 810a  Total Billable Time: 65 minutes    Subjective      Pt reports: he was compliant with home exercise program given last session.   Response to previous treatment: feels like his pain is decreasing with knee extension  Functional change: increased endurance for gait    Pain: 1/10  Location: right knee      Objective     Arpan received therapeutic exercises to develop strength, ROM, flexibility and core stabilization for 49 minutes including:  Education in diagnosis and plan of care  SAQ - 2 x 10, 3"  Prolonged extension stretch - 4', 4#  SB bridge - 2 x 10, 3"  HS stretch - 4 x 20"  SL RDL with knee extension pull - 2 x 10, green  TKE with SL  - 3 x 10, 10  Calf stretch SL - 3 x 30"  SL balance on airex - 3 x 30 tosses, 6# ball   Runners pose - 3 x 30"     Arpan received the following manual therapy techniques for 10 minutes, including:  Patellar mobilizations in all directions Gr II-III  Medial hamstrings stretching    Home Exercises Provided and Patient Education Provided     Education provided:   Continue current HEP    Written Home Exercises Provided: Continue with current HEP  Exercises were reviewed and Arpan was able to demonstrate them prior to the end of the session.      Pt received a written copy of exercises to perform at home.   See EMR under patient instructions for exercises given.     Arpan demonstrated good  understanding of the education " provided.     Assessment     The patient demonstrates improvement in tolerance for full passive knee extension but still limited in active extension with ambulation    Arpan is progressing well towards his goals.   Pt prognosis is Excellent.     Pt will continue to benefit from skilled outpatient physical therapy to address the deficits listed in the problem list box on initial evaluation, provide pt/family education and to maximize pt's level of independence in the home and community environment.     Pt's spiritual, cultural and educational needs considered and pt agreeable to plan of care and goals.    Anticipated barriers to physical therapy: None    Goals:   Short Term Goals: 4 weeks   1. The patient will demonstrate 0 degrees of affected knee extension AROM to increase ease with symmetry of ambulation.  2. The patient will demonstrate ability to ascend steps reciprocally without pain.  3. The patient will demonstrate 5/5 strength of affected quadriceps and hamstrings to increase ease with going up and down stairs, squatting.     Long Term Goals: 16 weeks   1. The patient will demonstrate 4 degrees of affected knee extension AROM to increase ease with return to jogging and symmetry of ambulation.  2. The patient will demonstrate ability to squat with no discomfort.  3. The patient will demonstrate 4+/5 strength of affected gluteus medius to increase ease with squatting, stairs.    Plan     Progress to light impact if able to perform single leg squat    Jeovanny Esquivel, PT    30F w/ PMH of HTN, HLD, DM, CVA with R hemiparesis, ESRD on HD (MWF) hx L foot Lisfranc fracture 02/2019 s/p ORIF in March 2019 c/b wound infection now presents with worsening left foot wound infection. Renal following for ESRD Mx.     labs, chart reviewed

## 2019-08-15 DIAGNOSIS — R10.84 GENERALIZED ABDOMINAL PAIN: ICD-10-CM

## 2019-08-15 RX ORDER — PANTOPRAZOLE SODIUM 20 MG/1
TABLET, DELAYED RELEASE ORAL
Qty: 90 TABLET | Refills: 0 | Status: SHIPPED | OUTPATIENT
Start: 2019-08-15 | End: 2019-11-06 | Stop reason: SDUPTHER

## 2019-09-07 ENCOUNTER — HOSPITAL ENCOUNTER (EMERGENCY)
Facility: OTHER | Age: 54
Discharge: HOME | End: 2019-09-07
Attending: EMERGENCY MEDICINE
Payer: COMMERCIAL

## 2019-09-07 VITALS
BODY MASS INDEX: 23.8 KG/M2 | WEIGHT: 170 LBS | OXYGEN SATURATION: 98 % | RESPIRATION RATE: 20 BRPM | HEIGHT: 71 IN | HEART RATE: 90 BPM | TEMPERATURE: 98 F | DIASTOLIC BLOOD PRESSURE: 69 MMHG | SYSTOLIC BLOOD PRESSURE: 115 MMHG

## 2019-09-07 DIAGNOSIS — I45.6 WPW (WOLFF-PARKINSON-WHITE SYNDROME): Primary | ICD-10-CM

## 2019-09-07 LAB
ALBUMIN SERPL BCP-MCNC: 4.5 G/DL (ref 3.5–5.2)
ALP SERPL-CCNC: 110 U/L (ref 55–135)
ALT SERPL W/O P-5'-P-CCNC: 32 U/L (ref 10–44)
ANION GAP SERPL CALC-SCNC: 13 MMOL/L (ref 8–16)
AST SERPL-CCNC: 30 U/L (ref 10–40)
BASOPHILS # BLD AUTO: 0.05 K/UL (ref 0–0.2)
BASOPHILS NFR BLD: 0.5 % (ref 0–1.9)
BILIRUB SERPL-MCNC: 0.3 MG/DL (ref 0.1–1)
BUN SERPL-MCNC: 14 MG/DL (ref 6–20)
CALCIUM SERPL-MCNC: 10.1 MG/DL (ref 8.7–10.5)
CHLORIDE SERPL-SCNC: 107 MMOL/L (ref 95–110)
CO2 SERPL-SCNC: 25 MMOL/L (ref 23–29)
CREAT SERPL-MCNC: 1 MG/DL (ref 0.5–1.4)
DIFFERENTIAL METHOD: NORMAL
EOSINOPHIL # BLD AUTO: 0.3 K/UL (ref 0–0.5)
EOSINOPHIL NFR BLD: 3.1 % (ref 0–8)
ERYTHROCYTE [DISTWIDTH] IN BLOOD BY AUTOMATED COUNT: 12.6 % (ref 11.5–14.5)
EST. GFR  (AFRICAN AMERICAN): >60 ML/MIN/1.73 M^2
EST. GFR  (NON AFRICAN AMERICAN): >60 ML/MIN/1.73 M^2
GLUCOSE SERPL-MCNC: 90 MG/DL (ref 70–110)
HCT VFR BLD AUTO: 48.7 % (ref 40–54)
HGB BLD-MCNC: 16.5 G/DL (ref 14–18)
IMM GRANULOCYTES # BLD AUTO: 0.02 K/UL (ref 0–0.04)
IMM GRANULOCYTES NFR BLD AUTO: 0.2 % (ref 0–0.5)
LYMPHOCYTES # BLD AUTO: 2.9 K/UL (ref 1–4.8)
LYMPHOCYTES NFR BLD: 31.4 % (ref 18–48)
MCH RBC QN AUTO: 30.6 PG (ref 27–31)
MCHC RBC AUTO-ENTMCNC: 33.9 G/DL (ref 32–36)
MCV RBC AUTO: 90 FL (ref 82–98)
MONOCYTES # BLD AUTO: 0.7 K/UL (ref 0.3–1)
MONOCYTES NFR BLD: 8.1 % (ref 4–15)
NEUTROPHILS # BLD AUTO: 5.2 K/UL (ref 1.8–7.7)
NEUTROPHILS NFR BLD: 56.7 % (ref 38–73)
NRBC BLD-RTO: 0 /100 WBC
PLATELET # BLD AUTO: 203 K/UL (ref 150–350)
PMV BLD AUTO: 11.2 FL (ref 9.2–12.9)
POTASSIUM SERPL-SCNC: 3.8 MMOL/L (ref 3.5–5.1)
PROT SERPL-MCNC: 8.2 G/DL (ref 6–8.4)
RBC # BLD AUTO: 5.4 M/UL (ref 4.6–6.2)
SODIUM SERPL-SCNC: 145 MMOL/L (ref 136–145)
WBC # BLD AUTO: 9.12 K/UL (ref 3.9–12.7)

## 2019-09-07 PROCEDURE — 25000003 PHARM REV CODE 250: Performed by: EMERGENCY MEDICINE

## 2019-09-07 PROCEDURE — 80053 COMPREHEN METABOLIC PANEL: CPT

## 2019-09-07 PROCEDURE — 63600175 PHARM REV CODE 636 W HCPCS: Performed by: PHYSICIAN ASSISTANT

## 2019-09-07 PROCEDURE — 96374 THER/PROPH/DIAG INJ IV PUSH: CPT

## 2019-09-07 PROCEDURE — 99291 CRITICAL CARE FIRST HOUR: CPT | Mod: 25

## 2019-09-07 PROCEDURE — 85025 COMPLETE CBC W/AUTO DIFF WBC: CPT

## 2019-09-07 PROCEDURE — 96361 HYDRATE IV INFUSION ADD-ON: CPT

## 2019-09-07 RX ORDER — SODIUM CHLORIDE 9 MG/ML
1000 INJECTION, SOLUTION INTRAVENOUS
Status: COMPLETED | OUTPATIENT
Start: 2019-09-07 | End: 2019-09-07

## 2019-09-07 RX ORDER — VERAPAMIL HYDROCHLORIDE 120 MG/1
240 TABLET, FILM COATED, EXTENDED RELEASE ORAL
Status: COMPLETED | OUTPATIENT
Start: 2019-09-07 | End: 2019-09-07

## 2019-09-07 RX ORDER — ADENOSINE 3 MG/ML
INJECTION, SOLUTION INTRAVENOUS
Status: DISCONTINUED
Start: 2019-09-07 | End: 2019-09-07 | Stop reason: HOSPADM

## 2019-09-07 RX ORDER — VERAPAMIL HYDROCHLORIDE 240 MG/1
240 TABLET, FILM COATED, EXTENDED RELEASE ORAL
Status: DISCONTINUED | OUTPATIENT
Start: 2019-09-07 | End: 2019-09-07 | Stop reason: SDUPTHER

## 2019-09-07 RX ORDER — ADENOSINE 3 MG/ML
6 INJECTION, SOLUTION INTRAVENOUS
Status: COMPLETED | OUTPATIENT
Start: 2019-09-07 | End: 2019-09-07

## 2019-09-07 RX ORDER — VERAPAMIL HYDROCHLORIDE 240 MG/1
240 CAPSULE, EXTENDED RELEASE ORAL DAILY
Qty: 30 CAPSULE | Refills: 0 | Status: SHIPPED | OUTPATIENT
Start: 2019-09-07 | End: 2019-10-07

## 2019-09-07 RX ADMIN — VERAPAMIL HYDROCHLORIDE 240 MG: 120 TABLET, FILM COATED, EXTENDED RELEASE ORAL at 07:09

## 2019-09-07 RX ADMIN — ADENOSINE 6 MG: 3 INJECTION, SOLUTION INTRAVENOUS at 06:09

## 2019-09-07 RX ADMIN — SODIUM CHLORIDE 1000 ML: 0.9 INJECTION, SOLUTION INTRAVENOUS at 06:09

## 2019-09-07 NOTE — ED NOTES
Unsuccessful response from 1st 6 mg IV push adenosine dose. Verbal order to give 2nd dose of 6 mg IV adenosine push at this time per CT and Dr. Edmondson. Pt AAOx4 and appropriate at this time. Pt remains on defib pads, spo2 and bp monitoring.

## 2019-09-07 NOTE — ED NOTES
"Pt with regular rhythm noted on monitor after 2nd IV adenosine admin. Pt AAOx4 and appropriate at this time. Respirations even and unlabored. No acute distress noted. Pt reporting he "feels better."   "

## 2019-09-07 NOTE — ED PROVIDER NOTES
"Encounter Date: 9/7/2019       History     Chief Complaint   Patient presents with    tachycardia     wpw syndrome     Patient is a 54-year-old male history of WPW, asthma, sleep apnea who presents with complaints of tachycardia and palpitations times 1.5 PTA.  He reports he tried Valsalva maneuvers at home with no relief.  He has a history of "attacks" but reports he has not had an episode since April of last year.  He has been placed on verapamil since January and has been compliant with this medication regimen.  He reports no current chest pain but reports a dull discomfort in his chest.  He has no associated bleeding, URI symptoms, nausea, vomiting, dizziness, altered mental status or syncope.  He is currently accompanied by female significant other is at bedside.  Of note, his cardiologist is Dr. Garcia.        Review of patient's allergies indicates:   Allergen Reactions    Dog hair standardized allergenic extract      Past Medical History:   Diagnosis Date    Allergy     Asthma     Sleep apnea     Tachycardia     WPW     Past Surgical History:   Procedure Laterality Date    hernia repair at 3 yrs old       Family History   Problem Relation Age of Onset    Hypertension Mother     Prostatitis Father     COPD Father     Lupus Sister     Melanoma Neg Hx      Social History     Tobacco Use    Smoking status: Never Smoker    Smokeless tobacco: Never Used   Substance Use Topics    Alcohol use: Yes     Alcohol/week: 0.0 oz     Comment: socially    Drug use: Not on file     Review of Systems   Constitutional: Negative for fever.   HENT: Negative for sore throat.    Respiratory: Negative for shortness of breath.    Cardiovascular: Negative for chest pain.        Tachycardia   Gastrointestinal: Negative for nausea.   Genitourinary: Negative for dysuria.   Musculoskeletal: Negative for back pain.   Skin: Negative for rash.   Neurological: Negative for weakness.   Hematological: Does not bruise/bleed " easily.       Physical Exam     Initial Vitals [09/07/19 1806]   BP Pulse Resp Temp SpO2   109/66 (!) 206 20 98.2 °F (36.8 °C) 100 %      MAP       --         Physical Exam    Nursing note and vitals reviewed.  Constitutional: He appears well-developed and well-nourished. He is not diaphoretic. No distress.   Healthy-appearing male in no acute distress but does appear anxious during interview and exam.  Slightly diaphoretic.  Makes good eye contact able to speak in clear full sentences and is cooperative.  Does not ambulate on my exam.   HENT:   Head: Normocephalic and atraumatic.   Eyes: Conjunctivae and EOM are normal. Pupils are equal, round, and reactive to light. Right eye exhibits no discharge. Left eye exhibits no discharge. No scleral icterus.   Neck: Normal range of motion.   Cardiovascular: Normal heart sounds and intact distal pulses. Exam reveals no gallop and no friction rub.    No murmur heard.  Tachycardic pulse 208 on monitor   Pulmonary/Chest: Breath sounds normal. He has no wheezes. He has no rhonchi. He has no rales.   Lungs clear to auscultation bilaterally   Abdominal: Soft. Bowel sounds are normal. There is no tenderness. There is no rebound and no guarding.   Benign abdomen   Musculoskeletal: Normal range of motion. He exhibits no edema or tenderness.   Lymphadenopathy:     He has no cervical adenopathy.   Neurological: He is alert and oriented to person, place, and time. He has normal strength. No cranial nerve deficit or sensory deficit. GCS score is 15. GCS eye subscore is 4. GCS verbal subscore is 5. GCS motor subscore is 6.   Normal mentation   Skin: Skin is warm. Capillary refill takes less than 2 seconds. No rash and no abscess noted. No erythema.   Psychiatric: He has a normal mood and affect. His behavior is normal. Thought content normal.         ED Course   Critical Care  Date/Time: 9/7/2019 7:36 PM  Performed by: Marquita Darling PA-C  Authorized by: Maninder Edmondson II, MD    Direct patient critical care time: 5 minutes  Additional history critical care time: 5 minutes  Ordering / reviewing critical care time: 5 minutes  Documentation critical care time: 5 minutes  Consulting other physicians critical care time: 5 minutes  Consult with family critical care time: 5 minutes  Other critical care time: 5 minutes  Total critical care time (exclusive of procedural time) : 35 minutes  Critical care was necessary to treat or prevent imminent or life-threatening deterioration of the following conditions: acute cardiac arrhythmia         Labs Reviewed   CBC W/ AUTO DIFFERENTIAL   COMPREHENSIVE METABOLIC PANEL           Labs Reviewed   CBC W/ AUTO DIFFERENTIAL   COMPREHENSIVE METABOLIC PANEL          Medical Decision Making:   ED Management:  Urgent evaluation a 54-year-old male who presents with complaints most consistent with WPW versus SVT with a rate of 210.  He is afebrile, nontoxic appearing, tachycardic but hemodynamically stable with normal mentation.  EKG reveals narrow complex tachycardia with a rate of 206.  He is placed on cardiac monitoring, large bore IV is established, Valsalva maneuvers or attempted without success.  6 mg of adenosine is pushed without success.  Second round of 6 mg adenosine is given with success, heart rate improved to 80s rhythm is normal sinus patient reports significant relief in symptoms. Diagnostic labs pending.  Will give his daily dose of verapamil (which he takes nightly) and discuss case with Dr. Garcia.    7:00 PM Discussed case with Dr. Garcia who strongly recommends admission overnight to the hospitalist service. Patient adamantly refuses admission. He is aware of Dr. Garcia's recommendations as well as risks associated with recurrent  arrythmias. Dr. Garcia's admission plan was to increase verapamil dosage to 240 mg daily. I request that patient stay for at least 30 more mins, which would total an hour observation post conversion,  Patient is anxious to go home and does not want to stay. I will discharge patient with this med change. He is strictly instructed to follow-up with his cardiologist ASAP and to return to the ER if any worsening symptoms or recurrent symptoms present. He and his accompanying wife verbalize understanding and are amenable to plan. Case discussed with attending MD who followed along closely and agrees with plan. Patient is stable for discharge at this time.   Other:   I have discussed this case with another health care provider.       <> Summary of the Discussion: Debo                      Clinical Impression:       ICD-10-CM ICD-9-CM   1. WPW (Sebastián-Parkinson-White syndrome) I45.6 426.7         Initial tracing    Initial EKG      Tracing of conversion after second dose of adenosine                          Marquita Darling PA-C  09/07/19 1944       Marquita Darling PA-C  09/08/19 1825

## 2019-09-07 NOTE — ED TRIAGE NOTES
Patient reports to ED with heartrate in the 200 range. Dr. Edmondson at bedside.    Pt states he has become tachycardic 4 times in last year, but never had to be hospitalized because of it. Patient states he has tried to valsalva down with no relief. Dr. Edmondson at bedside with patient. Patient connected to continuous monitoring. All other VS stable at this time.

## 2019-09-07 NOTE — ED NOTES
6 mg adenosine IV injection given at this time as pt remains on cont cardiac, spo2 and BP monitoring at this time. PARESH FOFANA, and Dr. Edmondson at BS at this time for adenosine admin. Pt AAOx4 and appropriate at this time.

## 2019-09-08 ENCOUNTER — PATIENT MESSAGE (OUTPATIENT)
Dept: PRIMARY CARE CLINIC | Facility: CLINIC | Age: 54
End: 2019-09-08

## 2019-09-08 RX ORDER — ALBUTEROL SULFATE 90 UG/1
2 AEROSOL, METERED RESPIRATORY (INHALATION) DAILY PRN
Qty: 18 G | Refills: 1 | Status: SHIPPED | OUTPATIENT
Start: 2019-09-08 | End: 2020-09-10 | Stop reason: SDUPTHER

## 2019-09-08 NOTE — ED NOTES
Pt arrived to ED w/ complaints of palpitations, dizziness and fatigue. Pt w/ diaphoresis, AAO x 3, answering questions appropriately

## 2019-09-10 DIAGNOSIS — M25.561 RIGHT KNEE PAIN, UNSPECIFIED CHRONICITY: Primary | ICD-10-CM

## 2019-09-11 ENCOUNTER — HOSPITAL ENCOUNTER (OUTPATIENT)
Dept: RADIOLOGY | Facility: HOSPITAL | Age: 54
Discharge: HOME | End: 2019-09-11
Attending: ORTHOPAEDIC SURGERY
Payer: COMMERCIAL

## 2019-09-11 ENCOUNTER — OFFICE VISIT (OUTPATIENT)
Dept: ORTHOPEDICS | Facility: CLINIC | Age: 54
End: 2019-09-11
Payer: COMMERCIAL

## 2019-09-11 VITALS — BODY MASS INDEX: 24.6 KG/M2 | WEIGHT: 175.69 LBS | HEIGHT: 71 IN

## 2019-09-11 DIAGNOSIS — M25.561 RIGHT KNEE PAIN, UNSPECIFIED CHRONICITY: ICD-10-CM

## 2019-09-11 DIAGNOSIS — M25.561 CHRONIC PAIN OF RIGHT KNEE: Primary | ICD-10-CM

## 2019-09-11 DIAGNOSIS — G89.29 CHRONIC PAIN OF RIGHT KNEE: Primary | ICD-10-CM

## 2019-09-11 PROCEDURE — 73560 X-RAY EXAM OF KNEE 1 OR 2: CPT | Mod: 26,59,LT, | Performed by: RADIOLOGY

## 2019-09-11 PROCEDURE — 99999 PR PBB SHADOW E&M-EST. PATIENT-LVL III: ICD-10-PCS | Mod: PBBFAC,,, | Performed by: ORTHOPAEDIC SURGERY

## 2019-09-11 PROCEDURE — 73560 XR KNEE ORTHO RIGHT: ICD-10-PCS | Mod: 26,59,LT, | Performed by: RADIOLOGY

## 2019-09-11 PROCEDURE — 73562 X-RAY EXAM OF KNEE 3: CPT | Mod: 26,RT,, | Performed by: RADIOLOGY

## 2019-09-11 PROCEDURE — 99999 PR PBB SHADOW E&M-EST. PATIENT-LVL III: CPT | Mod: PBBFAC,,, | Performed by: ORTHOPAEDIC SURGERY

## 2019-09-11 PROCEDURE — 73562 XR KNEE ORTHO RIGHT: ICD-10-PCS | Mod: 26,RT,, | Performed by: RADIOLOGY

## 2019-09-11 PROCEDURE — 3008F BODY MASS INDEX DOCD: CPT | Mod: CPTII,S$GLB,, | Performed by: ORTHOPAEDIC SURGERY

## 2019-09-11 PROCEDURE — 3008F PR BODY MASS INDEX (BMI) DOCUMENTED: ICD-10-PCS | Mod: CPTII,S$GLB,, | Performed by: ORTHOPAEDIC SURGERY

## 2019-09-11 PROCEDURE — 99203 PR OFFICE/OUTPT VISIT, NEW, LEVL III, 30-44 MIN: ICD-10-PCS | Mod: S$GLB,,, | Performed by: ORTHOPAEDIC SURGERY

## 2019-09-11 PROCEDURE — 99203 OFFICE O/P NEW LOW 30 MIN: CPT | Mod: S$GLB,,, | Performed by: ORTHOPAEDIC SURGERY

## 2019-09-11 PROCEDURE — 73562 X-RAY EXAM OF KNEE 3: CPT | Mod: TC,RT

## 2019-09-11 PROCEDURE — 73560 X-RAY EXAM OF KNEE 1 OR 2: CPT | Mod: TC,LT

## 2019-09-11 NOTE — PROGRESS NOTES
Subjective:      Patient ID: Arpan Gamboa is a 54 y.o. male.    Chief Complaint: Pain of the Right Knee    HPI   Arpan Gambao is a 54 year old male here with a 3 years history of right knee pain. The patient is a  for [employer]. There was a history of trauma. He ran a half marathon in 2016 with minimal training and developed pain in the second half of the race.  A few days later he was walking and had painful locking of the knee. He has no pain at the moment.  His primary complaint is inability to fully extend the leg. He has not been able to do any significant running since the original injury. The patient has not had prior surgery. There is not numbness or tingling of the lower extremity.  There is back pain.  He has tried medications and injections - steroid injections with Dr. Rahman.  He has also done a few rounds of physical therapy. They have not helped.  He does not have difficulty getting in or out of a car, getting dressed, or going up or down stairs.  The patient does not use an assistive device.     Past Medical History:   Diagnosis Date    Allergy     Asthma     Sleep apnea     Tachycardia     WPW     Past Surgical History:   Procedure Laterality Date    hernia repair at 3 yrs old       Social History     Socioeconomic History    Marital status:      Spouse name: Not on file    Number of children: Not on file    Years of education: Not on file    Highest education level: Not on file   Occupational History    Not on file   Social Needs    Financial resource strain: Not on file    Food insecurity:     Worry: Not on file     Inability: Not on file    Transportation needs:     Medical: Not on file     Non-medical: Not on file   Tobacco Use    Smoking status: Never Smoker    Smokeless tobacco: Never Used   Substance and Sexual Activity    Alcohol use: Yes     Alcohol/week: 0.0 oz     Comment: socially    Drug use: Not on file    Sexual activity: Not on file    Lifestyle    Physical activity:     Days per week: Not on file     Minutes per session: Not on file    Stress: Not on file   Relationships    Social connections:     Talks on phone: Not on file     Gets together: Not on file     Attends [employer] service: Not on file     Active member of club or organization: Not on file     Attends meetings of clubs or organizations: Not on file     Relationship status: Not on file   Other Topics Concern    Not on file   Social History Narrative    Not on file     Family History   Problem Relation Age of Onset    Hypertension Mother     Prostatitis Father     COPD Father     Lupus Sister     Melanoma Neg Hx        Current Outpatient Medications:     albuterol (VENTOLIN HFA) 90 mcg/actuation inhaler, Inhale 2 puffs into the lungs daily as needed., Disp: 18 g, Rfl: 1    ketoconazole (NIZORAL) 2 % shampoo, Lather scalp for 5-10 min, then rinse. Use 2-3 times per week., Disp: 240 mL, Rfl: 3    loratadine (CLARITIN) 10 mg tablet, Take 10 mg by mouth daily as needed for Allergies., Disp: , Rfl:     oxybutynin (DITROPAN XL) 10 MG 24 hr tablet, Take 10 mg by mouth once daily., Disp: , Rfl:     pantoprazole (PROTONIX) 20 MG tablet, TAKE 1 TABLET BY MOUTH EVERY DAY, Disp: 90 tablet, Rfl: 0    triamcinolone acetonide 0.1% (KENALOG) 0.1 % cream, Apply to affected areas of SCALP bid prn itching/flaking., Disp: 80 g, Rfl: 0    verapamil (VERELAN) 240 MG C24P, Take 1 capsule (240 mg total) by mouth once daily., Disp: 30 capsule, Rfl: 0  Review of patient's allergies indicates:   Allergen Reactions    Dog hair standardized allergenic extract          Review of Systems   Constitution: Negative for chills, fever and night sweats.   HENT: Negative for congestion and hearing loss.    Eyes: Negative for blurred vision, double vision and redness.   Cardiovascular: Negative for chest pain, claudication and leg swelling.   Respiratory: Negative for cough and shortness of breath.     Endocrine: Negative for polydipsia, polyphagia and polyuria.   Hematologic/Lymphatic: Negative for adenopathy and bleeding problem. Does not bruise/bleed easily.   Skin: Negative for poor wound healing and rash.   Musculoskeletal: Positive for back pain and joint pain. Negative for falls.   Gastrointestinal: Negative for diarrhea, heartburn, nausea and vomiting.   Genitourinary: Negative for bladder incontinence and dysuria.   Neurological: Negative for focal weakness, headaches, numbness, paresthesias and sensory change.   Psychiatric/Behavioral: Negative for altered mental status. The patient is not nervous/anxious.    Allergic/Immunologic: Negative for hives and persistent infections.         Objective:      Body mass index is 24.51 kg/m².    General    Constitutional: He is oriented to person, place, and time. He appears well-developed.   HENT:   Head: Normocephalic and atraumatic.   Eyes: Conjunctivae and EOM are normal.   Neck: Normal range of motion.   Cardiovascular: Intact distal pulses.    Pulmonary/Chest: Effort normal.   Neurological: He is alert and oriented to person, place, and time.     General Musculoskeletal Exam   Gait: normal       Right Knee Exam     Inspection   Erythema: absent  Scars: absent  Swelling: absent  Effusion: absent  Deformity: absent  Bruising: absent    Tenderness   The patient is tender to palpation of the medial joint line.    Crepitus   The patient has crepitus of the patella.    Range of Motion   Extension: 0   Flexion:  130 normal     Tests   Meniscus   Jeannie:  Medial - positive Lateral - negative  Ligament Examination Lachman: normal (-1 to 2mm) PCL-Posterior Drawer: normal (0 to 2mm)     MCL - Valgus: normal (0 to 2mm)  LCL - Varus: normal  Patella   Patellar apprehension: negative  Passive Patellar Tilt: neutral  Patellar Tracking: normal    Other   Sensation: normal    Left Knee Exam   Left knee exam is normal.    Inspection   Erythema: absent  Scars:  absent  Swelling: absent  Effusion: absent  Deformity: absent  Bruising: absent    Tenderness   The patient is experiencing no tenderness.     Range of Motion   The patient has normal left knee ROM.  Extension: -5   Flexion:  140 normal     Tests   Meniscus   Jeannie:  Medial - negative Lateral - negative  Stability Lachman: normal (-1 to 2mm) PCL-Posterior Drawer: normal (0 to 2mm)  MCL - Valgus: normal (0 to 2mm)  LCL - Varus: normal (0 to 2mm)  Patella   Patellar apprehension: negative  Passive Patellar Tilt: neutral  Patellar Tracking: normal    Other   Sensation: normal    Muscle Strength   Right Lower Extremity   Hip Abduction: 5/5   Quadriceps:  5/5   Hamstrin/5   Left Lower Extremity   Hip Abduction: 5/5   Quadriceps:  5/5   Hamstrin/5     Reflexes     Left Side  Quadriceps:  2+    Right Side   Quadriceps:  2+    Vascular Exam     Right Pulses  Dorsalis Pedis:      2+          Left Pulses  Dorsalis Pedis:      2+          Edema  Right Lower Leg: absent  Left Lower Leg: absent      Radiographs taken today and reviewed by me demonstrate minimal arthritic change of the right KNEE(s).There  is not bone destruction.  There is not a fracture. There is no deformity.        Assessment:       Encounter Diagnosis   Name Primary?    Chronic pain of right knee Yes          Plan:       Arpan was seen today for pain.    Diagnoses and all orders for this visit:    Chronic pain of right knee  -     MRI Knee Without Contrast Right; Future      Options were discussed at length.  He has had pain for greater than three years.  There is no relief with activity modification, physical therapy, injection or NSAIDS.  Heis having mechanical symptoms.  An MRI is indicated.  F/U after the MRI.

## 2019-09-13 ENCOUNTER — HOSPITAL ENCOUNTER (OUTPATIENT)
Dept: RADIOLOGY | Facility: HOSPITAL | Age: 54
Discharge: HOME | End: 2019-09-13
Attending: ORTHOPAEDIC SURGERY
Payer: COMMERCIAL

## 2019-09-13 DIAGNOSIS — M25.561 CHRONIC PAIN OF RIGHT KNEE: ICD-10-CM

## 2019-09-13 DIAGNOSIS — G89.29 CHRONIC PAIN OF RIGHT KNEE: ICD-10-CM

## 2019-09-13 PROCEDURE — 73721 MRI KNEE WITHOUT CONTRAST RIGHT: ICD-10-PCS | Mod: 26,RT,, | Performed by: RADIOLOGY

## 2019-09-13 PROCEDURE — 73721 MRI JNT OF LWR EXTRE W/O DYE: CPT | Mod: TC,RT

## 2019-09-13 PROCEDURE — 73721 MRI JNT OF LWR EXTRE W/O DYE: CPT | Mod: 26,RT,, | Performed by: RADIOLOGY

## 2019-09-16 ENCOUNTER — TELEPHONE (OUTPATIENT)
Dept: ORTHOPEDICS | Facility: CLINIC | Age: 54
End: 2019-09-16

## 2019-09-16 NOTE — TELEPHONE ENCOUNTER
Lm for pt regarding his MRI results. Also sent pt message via patient portal.    ----- Message from Carlos Denney MD sent at 9/16/2019  7:58 AM CDT -----  Please call pt.  He has mensicus tear and some cartilage loss. Schedule f/u.

## 2019-10-11 ENCOUNTER — PATIENT OUTREACH (OUTPATIENT)
Dept: ADMINISTRATIVE | Facility: OTHER | Age: 54
End: 2019-10-11

## 2019-11-06 DIAGNOSIS — R10.84 GENERALIZED ABDOMINAL PAIN: ICD-10-CM

## 2019-11-06 RX ORDER — PANTOPRAZOLE SODIUM 20 MG/1
TABLET, DELAYED RELEASE ORAL
Qty: 90 TABLET | Refills: 0 | Status: SHIPPED | OUTPATIENT
Start: 2019-11-06 | End: 2020-09-16

## 2019-11-13 ENCOUNTER — PATIENT MESSAGE (OUTPATIENT)
Dept: ADMINISTRATIVE | Facility: HOSPITAL | Age: 54
End: 2019-11-13

## 2019-11-14 ENCOUNTER — PATIENT OUTREACH (OUTPATIENT)
Dept: ADMINISTRATIVE | Facility: OTHER | Age: 54
End: 2019-11-14

## 2019-11-18 ENCOUNTER — TELEPHONE (OUTPATIENT)
Dept: ORTHOPEDICS | Facility: CLINIC | Age: 54
End: 2019-11-18

## 2019-11-18 ENCOUNTER — OFFICE VISIT (OUTPATIENT)
Dept: ORTHOPEDICS | Facility: CLINIC | Age: 54
End: 2019-11-18
Payer: COMMERCIAL

## 2019-11-18 VITALS — HEIGHT: 71 IN | BODY MASS INDEX: 25.14 KG/M2 | WEIGHT: 179.56 LBS

## 2019-11-18 DIAGNOSIS — M23.203 OLD COMPLEX TEAR OF MEDIAL MENISCUS OF RIGHT KNEE: Primary | ICD-10-CM

## 2019-11-18 PROCEDURE — 99213 OFFICE O/P EST LOW 20 MIN: CPT | Mod: S$GLB,,, | Performed by: ORTHOPAEDIC SURGERY

## 2019-11-18 PROCEDURE — 99999 PR PBB SHADOW E&M-EST. PATIENT-LVL III: ICD-10-PCS | Mod: PBBFAC,,, | Performed by: ORTHOPAEDIC SURGERY

## 2019-11-18 PROCEDURE — 3008F BODY MASS INDEX DOCD: CPT | Mod: CPTII,S$GLB,, | Performed by: ORTHOPAEDIC SURGERY

## 2019-11-18 PROCEDURE — 3008F PR BODY MASS INDEX (BMI) DOCUMENTED: ICD-10-PCS | Mod: CPTII,S$GLB,, | Performed by: ORTHOPAEDIC SURGERY

## 2019-11-18 PROCEDURE — 99999 PR PBB SHADOW E&M-EST. PATIENT-LVL III: CPT | Mod: PBBFAC,,, | Performed by: ORTHOPAEDIC SURGERY

## 2019-11-18 PROCEDURE — 99213 PR OFFICE/OUTPT VISIT, EST, LEVL III, 20-29 MIN: ICD-10-PCS | Mod: S$GLB,,, | Performed by: ORTHOPAEDIC SURGERY

## 2019-11-18 RX ORDER — VERAPAMIL HYDROCHLORIDE 120 MG/1
120 CAPSULE, EXTENDED RELEASE ORAL DAILY
COMMUNITY
End: 2020-02-05

## 2019-11-18 NOTE — TELEPHONE ENCOUNTER
I informed Pt. That he is due to have his flu and Tetanus shot done Pt. Stated that he can't have his flu shot yet because he has a cold. I asked Pt. If he wanted to have his Tetanus shot done Pt. Said he will have both done at the same time.

## 2019-11-18 NOTE — PROGRESS NOTES
"Subjective:      Patient ID: Arpan Gamboa is a 54 y.o. male.    Chief Complaint: Follow-up of the Right Knee    HPI  Arpan Gamboa has right knee pain.  The pain is unchanged. The pain is mild, but he can't run or straightening his leg. The pain is located in the posterior aspect of the knee.  There  is not radiation.  There is associated stiffness.   There is catching and locking. The pain is described as achy. The pain is aggravated by ectension and moderate activity.  It is alleviated by rest.  There is not associated back pain.  His history, medications and problem list were reviewed.    Review of Systems   Constitution: Negative for chills, fever and night sweats.   HENT: Negative for hearing loss.    Eyes: Negative for blurred vision and double vision.   Cardiovascular: Negative for chest pain, claudication and leg swelling.   Respiratory: Negative for shortness of breath.    Endocrine: Negative for polydipsia, polyphagia and polyuria.   Hematologic/Lymphatic: Negative for adenopathy and bleeding problem. Does not bruise/bleed easily.   Skin: Negative for poor wound healing.   Musculoskeletal: Positive for joint pain.   Gastrointestinal: Negative for diarrhea and heartburn.   Genitourinary: Negative for bladder incontinence.   Neurological: Negative for focal weakness, headaches, numbness, paresthesias and sensory change.   Psychiatric/Behavioral: The patient is not nervous/anxious.    Allergic/Immunologic: Negative for persistent infections.         Objective:      Body mass index is 25.04 kg/m².  Vitals:    11/18/19 1602   Weight: 81.4 kg (179 lb 9 oz)   Height: 5' 11" (1.803 m)           General    Constitutional: He is oriented to person, place, and time. He appears well-developed and well-nourished.   HENT:   Head: Normocephalic and atraumatic.   Eyes: EOM are normal.   Cardiovascular: Normal rate.    Pulmonary/Chest: Effort normal.   Neurological: He is alert and oriented to person, place, and " time.   Psychiatric: He has a normal mood and affect. His behavior is normal.     General Musculoskeletal Exam   Gait: normal       Right Knee Exam     Inspection   Erythema: absent  Scars: absent  Swelling: absent  Effusion: absent  Deformity: absent  Bruising: absent    Tenderness   The patient is tender to palpation of the medial joint line.    Range of Motion   Extension: 5   Flexion: 130     Tests   Meniscus   Jeannie:  Medial - positive   Ligament Examination Lachman: normal (-1 to 2mm)   MCL - Valgus: normal (0 to 2mm)  LCL - Varus: normal  Patella   Passive Patellar Tilt: neutral    Other   Sensation: normal    Left Knee Exam     Inspection   Erythema: absent  Scars: absent  Swelling: absent  Effusion: absent  Deformity: absent  Bruising: absent    Tenderness   The patient is experiencing no tenderness.     Range of Motion   Extension: 0   Flexion: 130     Tests   Stability Lachman: normal (-1 to 2mm)   MCL - Valgus: normal (0 to 2mm)  LCL - Varus: normal (0 to 2mm)  Patella   Passive Patellar Tilt: neutral    Other   Sensation: normal    Muscle Strength   Right Lower Extremity   Hip Abduction: 5/5   Quadriceps:  5/5   Hamstrin/5   Left Lower Extremity   Hip Abduction: 5/5   Quadriceps:  5/5   Hamstrin/5     Reflexes     Left Side  Quadriceps:  2+    Right Side   Quadriceps:  2+    Vascular Exam     Right Pulses  Dorsalis Pedis:      2+          Left Pulses  Dorsalis Pedis:      2+          Edema  Right Lower Leg: absent  Left Lower Leg: absent      MRI reviewed.  Medial meniscus tear with patellofemoral arthritis        Assessment:       Encounter Diagnosis   Name Primary?    Old complex tear of medial meniscus of right knee Yes          Plan:       Arpan was seen today for follow-up.    Diagnoses and all orders for this visit:    Old complex tear of medial meniscus of right knee        Options including injection, PT, and arthroscopy discussed.  Considering PT or scope.  He will call

## 2019-12-11 NOTE — PROGRESS NOTES
Subjective:       Patient ID: Arpan Gamboa is a 54 y.o. male with PSVT, Asthma, BPH, AMANDEEP who was seen initially on 5/20/2019 with follow up on 5/27/2019.    Chief Complaint: Follow-up    HPI     Patient overall feeling better.    Complains of decreased libido  -admits to increased stress at work.  May move to [place] next summer do to job.  Scheduled for EPS in January.    Patient denies n/v.  No chest pain or SOB.  No headaches or change in vision.  No dizziness.  No significant  weight gain or weight loss.  Remaining ROS negative.    Review of Systems   Constitutional: Negative for appetite change, diaphoresis, fatigue and unexpected weight change.   HENT: Negative for congestion, ear pain, hearing loss, rhinorrhea, sinus pressure, sore throat, trouble swallowing and voice change.    Eyes: Negative for photophobia, pain and visual disturbance.   Respiratory: Negative for cough, chest tightness, shortness of breath and wheezing.    Cardiovascular: Negative for chest pain, palpitations and leg swelling.   Gastrointestinal: Negative for abdominal pain, blood in stool, constipation, diarrhea, nausea and vomiting.   Endocrine: Negative for cold intolerance, heat intolerance, polydipsia, polyphagia and polyuria.   Genitourinary: Negative for decreased urine volume, difficulty urinating, discharge, dysuria, flank pain, hematuria, scrotal swelling, testicular pain and urgency.   Musculoskeletal: Negative for arthralgias, back pain, myalgias and neck pain.   Skin: Negative for rash.   Neurological: Negative for dizziness, tremors, syncope, weakness, numbness and headaches.   Hematological: Does not bruise/bleed easily.   Psychiatric/Behavioral: Negative for agitation, confusion and sleep disturbance. The patient is not nervous/anxious.        Objective:      Physical Exam   Constitutional: He is oriented to person, place, and time. He appears well-developed and well-nourished.   HENT:   Head: Normocephalic.   Nose:  Nose normal.   Mouth/Throat: Oropharynx is clear and moist.   Eyes: Pupils are equal, round, and reactive to light. Conjunctivae and EOM are normal. Right eye exhibits no discharge. Left eye exhibits no discharge. No scleral icterus.   Neck: Normal range of motion. Neck supple. No thyromegaly present.   Cardiovascular: Normal rate, regular rhythm, normal heart sounds and intact distal pulses. Exam reveals no gallop and no friction rub.   No murmur heard.  Pulmonary/Chest: Effort normal and breath sounds normal. No respiratory distress. He has no wheezes. He has no rales.   Abdominal: Soft. Bowel sounds are normal. He exhibits no distension and no mass. There is no tenderness. There is no rebound and no guarding. No hernia.   Musculoskeletal: Normal range of motion. He exhibits no edema.   Lymphadenopathy:     He has no cervical adenopathy.   Neurological: He is alert and oriented to person, place, and time. No cranial nerve deficit or sensory deficit.   Skin: Skin is warm and dry. No rash noted. No erythema.   Psychiatric: He has a normal mood and affect. His behavior is normal. Thought content normal.       Assessment:       1. Sebastián-Parkinson-White syndrome    2. Mild intermittent asthma without complication    3. Right medial knee pain    4. Obstructive sleep apnea syndrome    5. Allergic rhinitis due to pollen, unspecified seasonality    6. Screening cholesterol level    7. Encounter for vitamin deficiency screening    8. Need for influenza vaccination    9. Decreased libido without sexual dysfunction        Plan:   -Today's Visit - patient is awake and alert today.    -----------------------------------------------------  -Last Visit 5/20/2019 - Abdominal Pain with Diarrhea - Patient with complaints of abdominal pain and diarrhea - 5-6 days.  BM watery and 2-4 times per day.  Starting taking loperamide, simethicone, antacids, and probiotic.  Diarrhea improved, but still with gas.  Having a continuous  "mid-abdominal pain (above umbilicus) - was a 5/10 last week and today is a 2/10.  [place] out of the country 3 [place] ago.  No change in diet.  Had fried catfish sandwich last week and had a mufaleta sandwich on Tuesday before his pains started on Wednesday.  Has been under much stress lately.  Had reflux 20 years ago - had EGD in past.  Lost 5 pounds in the last week.  No melena or hematochezia.  Had temp of 37.4 on the first day.  Had chills Saturday.   Has urinary frequency, but had this last year and treated with medication.  No dysuria.  Gets weak when he ingests gluten.  No recent antibiotic use.  Exam with taty-umbilical tenderness with no rebound.  Recommended to Stop Loperamide and   Start protonix 20mg once a day.    CMP with elevated ALT at 49 only, Amylase/Lipase normal, H. Pylori not done, Celiac Panel negative, Stool culture and Giardia not done.   TSH low at 0.355, but fT4 normal.  Repeat in 3-6 months.  CT of Abd-Pelvis 5/22/2019 with No acute process or significant abnormality seen.  Liver lesions most likely cysts.    Repeat Hepatic panel and consider further work-up.    Patient overall feeling better.  Still with some abdominal discomfort, but much improved.  Exam with no tenderness today.  Continue Protonix for now.  He has an appointment with [facility] in June.  He also has an appointment with his wife's GI (Dr. Tyson) doctor today - he will see his Fellow (Dr. Islas).  -----------------------------------------------------------  -Cards - PSVT/WPW - last seen by Cardiology at [facility] ([place], [place]) on 1/10/2019 - "  He presents with paroxysmal tachycardia > 200 bpm since the age of 16 which occurs rarely (about 4 times yearly), but which is very symptomatic. He was told as a child that he had WPW, however neither today's EKG nor the EKG and Holter from 3/2017 show evidence of pre-excitation. No strip of the arrhythmia is available, but overall the clinical story is most suggestive of an SVT, " "likely AVNRT; ORT via a concealed accessory pathway remains in the differential as do AT, AF, AFL and VT, although these are less likely.  We discussed that capturing these rare episodes of tachycardia on a monitor is difficult, and so we proposed two reasonable management options:  1) Proceed to EP study +/- ablation  2) Trial of medical therapy (calcium channel blocker vs. Beta blocker).  After a lengthy discussion of the risks/benefits of each approach, the Mr. Gamboa and his wife preferred to start with medical therapy. They will see how he responds to and tolerates the medication, and consider EPS +/- ablation in the future if there is an inadequate response or medication intolerance.  --Start verapamil SR 120mg every evening; we discussed side effects such as orthostatic hypotension, bradycardia, constipation  --Continue to use vagal maneuvers to abort any SVT episodes  --The patient will try to obtain an EKG of the arrhythmia from his 2007 [place] hospitalization  --He will consider EPS +/- ablation for SVT  --Follow up in 1 year or sooner if needed".    BP today is good at 120/68.    Check Fasting Lipids - ordered in 5/2019 and not yet done.    Had follow up with Cardiology on 9/8/2019 and scheduled for EPS with Ablation on 1/6/2020 at [facility].    -Pulmonary - Mild Intermittent Asthma and AMANDEEP -  Seen by Sleep on 7/24/2017 - "AMANDEEP, severe, supine worse. 7/24/17: Adherent with apap, benefiting from therapy. Residual elevated mild ahi could be from leak or pressure adjustment.Symptoms improved  He has medical co-morbidities of moderate retrognathia. This warrants treatment for obstructive sleep apnea.  Continue APAP, ajdust today 7-14cm. Continue to improve mask on time/sleep time. Avoid mask leaks  Discussed etiology of AMANDEEP and potential ramifications of untreated AMANDEEP, including heart disease, hypertension, cognitive difficulties, stroke, and diabetes. AHI>15 associated with increased cardiovascular " "risk".     He wants to see an allergist, as he always need to take Claritin and wants to know what his allergies are.    -ENT - Nasal Congestion - seen by ENT on 11/19/2018 - "In summary, Mr. Gamboa is a 53 year old male with increased nasal congestion and pain. Exam shows dry nasal mucosa, likely from CPAP use. Recommend addition of nasal saline for improved mucociliary clearance. Vaseline at night as tolerated. RTC if symptoms fail to improve or worsen".     -Derm - Seborrheic Dermatitis - Seen by Derm on 11/9/2018 -"-     ketoconazole (NIZORAL) 2 % shampoo; Lather scalp for 5-10 min, then rinse. Use 2-3 times per week.  Benign-appearing nevi on exam today. Counseled patient to monitor mole(s) and return to clinic if any changes in size, shape, or color are noted or if it becomes symptomatic (bleeding, itching, pain, etc)".    -MSK - Right Knee Pain - Seen by Ortho on 8/27/2018 - "Given extreme dysfunction and discomfort, coupled with physical examination suggesting meniscal pathology and non-diagnostic x-ray imaging, we will obtain MRI imaging for further evaluation of the soft tissue structures of the knee".     He wants a second opinion.  Will refer to another orthopedist - last seen on 11/18/2019 - "Options including injection, PT, and arthroscopy discussed.  Considering PT or scope.  He will call".    -GI - Discussed Screening Colonoscopy - Seen by GI on 5/27/2019 - "He is due for screening colonoscopy. We will schedule this after resolution of gastroenteritis. Likely in 2-3 months".  He will wait until his EPS is done as above.    ALT elevated at 49 in 5/2019 and repeat in 9/2019 was normal.    -Endocrine - TSH low at 0.355, but fT4 normal in 5/2019.   Repeat in 3-6 months.    - - BPH - We discussed Prostate cancer screening - Check PSA - ordered in 5/2019 and not yet done.      We discussed STD screening - declined.    Seen by Urology on 6/26/2017 -"A post void residual bladder scan was performed " "immediately after voiding. The patient's PVR was noted to be 47mL.  -Avoid bladder irritants including but not limited to caffeine, alcohol, smoking, spicy foods, acidic foods, tomato-based products, citrus, artificial sweeteners, chocolate, coffee or tea.  -prostate meds: none; offered flomax.  We will hold off for right now".     Having difficulty having ejaculations.  Having increased stress lately.  No new medications.  Has low libido.  No ED.  Will check testosterone level.  Consider Urology.    -Psych - complains of stress and is affecting sleeping, work, and relationships.  Will refer to Psychotherapist - saw someone once 9/2019.  Decided not to continue.     -HCM - Discussed Flu (today) and Tdap (he thinks he had it in 1/2019 as part of his Green Card) Vaccines.  We discussed Shingles (Recommend to get on a waiting list) vaccination.      -Follow Up - 6 months  "

## 2019-12-13 ENCOUNTER — OFFICE VISIT (OUTPATIENT)
Dept: PRIMARY CARE CLINIC | Facility: CLINIC | Age: 54
End: 2019-12-13
Payer: COMMERCIAL

## 2019-12-13 VITALS
SYSTOLIC BLOOD PRESSURE: 118 MMHG | DIASTOLIC BLOOD PRESSURE: 78 MMHG | HEART RATE: 89 BPM | WEIGHT: 183 LBS | HEIGHT: 71 IN | OXYGEN SATURATION: 96 % | BODY MASS INDEX: 25.62 KG/M2

## 2019-12-13 DIAGNOSIS — J45.20 MILD INTERMITTENT ASTHMA WITHOUT COMPLICATION: ICD-10-CM

## 2019-12-13 DIAGNOSIS — M25.561 RIGHT MEDIAL KNEE PAIN: ICD-10-CM

## 2019-12-13 DIAGNOSIS — J30.1 ALLERGIC RHINITIS DUE TO POLLEN, UNSPECIFIED SEASONALITY: ICD-10-CM

## 2019-12-13 DIAGNOSIS — Z13.220 SCREENING CHOLESTEROL LEVEL: ICD-10-CM

## 2019-12-13 DIAGNOSIS — Z13.21 ENCOUNTER FOR VITAMIN DEFICIENCY SCREENING: ICD-10-CM

## 2019-12-13 DIAGNOSIS — I45.6 WOLFF-PARKINSON-WHITE SYNDROME: Primary | ICD-10-CM

## 2019-12-13 DIAGNOSIS — G47.33 OBSTRUCTIVE SLEEP APNEA SYNDROME: ICD-10-CM

## 2019-12-13 DIAGNOSIS — R68.82 DECREASED LIBIDO WITHOUT SEXUAL DYSFUNCTION: ICD-10-CM

## 2019-12-13 DIAGNOSIS — Z23 NEED FOR INFLUENZA VACCINATION: ICD-10-CM

## 2019-12-13 PROCEDURE — 99999 PR PBB SHADOW E&M-EST. PATIENT-LVL III: ICD-10-PCS | Mod: PBBFAC,,, | Performed by: INTERNAL MEDICINE

## 2019-12-13 PROCEDURE — 90686 IIV4 VACC NO PRSV 0.5 ML IM: CPT | Mod: S$GLB,,, | Performed by: INTERNAL MEDICINE

## 2019-12-13 PROCEDURE — 99999 PR PBB SHADOW E&M-EST. PATIENT-LVL III: CPT | Mod: PBBFAC,,, | Performed by: INTERNAL MEDICINE

## 2019-12-13 PROCEDURE — 90471 FLU VACCINE (QUAD) GREATER THAN OR EQUAL TO 3YO PRESERVATIVE FREE IM: ICD-10-PCS | Mod: S$GLB,,, | Performed by: INTERNAL MEDICINE

## 2019-12-13 PROCEDURE — 90686 FLU VACCINE (QUAD) GREATER THAN OR EQUAL TO 3YO PRESERVATIVE FREE IM: ICD-10-PCS | Mod: S$GLB,,, | Performed by: INTERNAL MEDICINE

## 2019-12-13 PROCEDURE — 3008F BODY MASS INDEX DOCD: CPT | Mod: CPTII,S$GLB,, | Performed by: INTERNAL MEDICINE

## 2019-12-13 PROCEDURE — 99214 OFFICE O/P EST MOD 30 MIN: CPT | Mod: 25,S$GLB,, | Performed by: INTERNAL MEDICINE

## 2019-12-13 PROCEDURE — 3008F PR BODY MASS INDEX (BMI) DOCUMENTED: ICD-10-PCS | Mod: CPTII,S$GLB,, | Performed by: INTERNAL MEDICINE

## 2019-12-13 PROCEDURE — 99214 PR OFFICE/OUTPT VISIT, EST, LEVL IV, 30-39 MIN: ICD-10-PCS | Mod: 25,S$GLB,, | Performed by: INTERNAL MEDICINE

## 2019-12-13 PROCEDURE — 90471 IMMUNIZATION ADMIN: CPT | Mod: S$GLB,,, | Performed by: INTERNAL MEDICINE

## 2019-12-13 NOTE — PATIENT INSTRUCTIONS
Your exam was overall normal today.    Your blood pressure was good.    I will order routine fasting labs today - at least 9 hours of fasting.    We will give you the Flu Vaccine today.  I recommend getting on a waiting list at your local pharmacy for the Shingles vaccine (Shingrix) is interested.    Return in 6 months - sooner if needed.  Please come at least 15-20 minutes before your scheduled appointment time.

## 2019-12-13 NOTE — PROGRESS NOTES
"Patient was given vaccine information sheet for the Flu Vaccine. The area of injection was palpated using the acromion process as a landmark. This area was cleaned with alcohol. Using a 25g 1" safety needle, 0.5mL of the vaccine was placed into the LEFT deltoid muscle. The injection site was dressed with a bandage. Patient experienced no complications and was discharged in stable condition. Fluarix vaccine Lot: [id number] Exp: 06/30/2020    "

## 2019-12-20 ENCOUNTER — PATIENT MESSAGE (OUTPATIENT)
Dept: PRIMARY CARE CLINIC | Facility: CLINIC | Age: 54
End: 2019-12-20

## 2019-12-20 ENCOUNTER — LAB VISIT (OUTPATIENT)
Dept: LAB | Facility: HOSPITAL | Age: 54
End: 2019-12-20
Attending: INTERNAL MEDICINE
Payer: COMMERCIAL

## 2019-12-20 DIAGNOSIS — I45.6 WOLFF-PARKINSON-WHITE SYNDROME: ICD-10-CM

## 2019-12-20 DIAGNOSIS — Z12.5 PROSTATE CANCER SCREENING: ICD-10-CM

## 2019-12-20 DIAGNOSIS — Z13.220 SCREENING CHOLESTEROL LEVEL: ICD-10-CM

## 2019-12-20 DIAGNOSIS — R79.89 ELEVATED LFTS: ICD-10-CM

## 2019-12-20 DIAGNOSIS — R68.82 DECREASED LIBIDO WITHOUT SEXUAL DYSFUNCTION: ICD-10-CM

## 2019-12-20 DIAGNOSIS — Z13.21 ENCOUNTER FOR VITAMIN DEFICIENCY SCREENING: ICD-10-CM

## 2019-12-20 DIAGNOSIS — Z00.00 ANNUAL PHYSICAL EXAM: ICD-10-CM

## 2019-12-20 LAB
25(OH)D3+25(OH)D2 SERPL-MCNC: 27 NG/ML (ref 30–96)
ALBUMIN SERPL BCP-MCNC: 4 G/DL (ref 3.5–5.2)
ALP SERPL-CCNC: 82 U/L (ref 55–135)
ALT SERPL W/O P-5'-P-CCNC: 25 U/L (ref 10–44)
AST SERPL-CCNC: 26 U/L (ref 10–40)
BASOPHILS # BLD AUTO: 0.05 K/UL (ref 0–0.2)
BASOPHILS NFR BLD: 0.9 % (ref 0–1.9)
BILIRUB DIRECT SERPL-MCNC: 0.3 MG/DL (ref 0.1–0.3)
BILIRUB SERPL-MCNC: 0.7 MG/DL (ref 0.1–1)
CHOLEST SERPL-MCNC: 210 MG/DL (ref 120–199)
CHOLEST/HDLC SERPL: 3.6 {RATIO} (ref 2–5)
COMPLEXED PSA SERPL-MCNC: 0.42 NG/ML (ref 0–4)
DIFFERENTIAL METHOD: ABNORMAL
EOSINOPHIL # BLD AUTO: 0.4 K/UL (ref 0–0.5)
EOSINOPHIL NFR BLD: 7.2 % (ref 0–8)
ERYTHROCYTE [DISTWIDTH] IN BLOOD BY AUTOMATED COUNT: 12.3 % (ref 11.5–14.5)
HCT VFR BLD AUTO: 46.5 % (ref 40–54)
HDLC SERPL-MCNC: 58 MG/DL (ref 40–75)
HDLC SERPL: 27.6 % (ref 20–50)
HGB BLD-MCNC: 15.2 G/DL (ref 14–18)
IMM GRANULOCYTES # BLD AUTO: 0.02 K/UL (ref 0–0.04)
IMM GRANULOCYTES NFR BLD AUTO: 0.3 % (ref 0–0.5)
LDLC SERPL CALC-MCNC: 133.6 MG/DL (ref 63–159)
LYMPHOCYTES # BLD AUTO: 1.8 K/UL (ref 1–4.8)
LYMPHOCYTES NFR BLD: 31.2 % (ref 18–48)
MCH RBC QN AUTO: 31.1 PG (ref 27–31)
MCHC RBC AUTO-ENTMCNC: 32.7 G/DL (ref 32–36)
MCV RBC AUTO: 95 FL (ref 82–98)
MONOCYTES # BLD AUTO: 0.5 K/UL (ref 0.3–1)
MONOCYTES NFR BLD: 9.2 % (ref 4–15)
NEUTROPHILS # BLD AUTO: 3 K/UL (ref 1.8–7.7)
NEUTROPHILS NFR BLD: 51.2 % (ref 38–73)
NONHDLC SERPL-MCNC: 152 MG/DL
NRBC BLD-RTO: 0 /100 WBC
PLATELET # BLD AUTO: 169 K/UL (ref 150–350)
PMV BLD AUTO: 11.4 FL (ref 9.2–12.9)
PROT SERPL-MCNC: 7.1 G/DL (ref 6–8.4)
RBC # BLD AUTO: 4.89 M/UL (ref 4.6–6.2)
TESTOST SERPL-MCNC: 391 NG/DL (ref 304–1227)
TRIGL SERPL-MCNC: 92 MG/DL (ref 30–150)
TSH SERPL DL<=0.005 MIU/L-ACNC: 0.87 UIU/ML (ref 0.4–4)
WBC # BLD AUTO: 5.87 K/UL (ref 3.9–12.7)

## 2019-12-20 PROCEDURE — 36415 COLL VENOUS BLD VENIPUNCTURE: CPT | Mod: PN

## 2019-12-20 PROCEDURE — 82306 VITAMIN D 25 HYDROXY: CPT

## 2019-12-20 PROCEDURE — 84403 ASSAY OF TOTAL TESTOSTERONE: CPT

## 2019-12-20 PROCEDURE — 84153 ASSAY OF PSA TOTAL: CPT

## 2019-12-20 PROCEDURE — 84443 ASSAY THYROID STIM HORMONE: CPT

## 2019-12-20 PROCEDURE — 80076 HEPATIC FUNCTION PANEL: CPT

## 2019-12-20 PROCEDURE — 85025 COMPLETE CBC W/AUTO DIFF WBC: CPT

## 2019-12-20 PROCEDURE — 80061 LIPID PANEL: CPT

## 2019-12-30 DIAGNOSIS — G47.33 OSA (OBSTRUCTIVE SLEEP APNEA): Primary | ICD-10-CM

## 2020-01-07 ENCOUNTER — TELEPHONE (OUTPATIENT)
Dept: INTERNAL MEDICINE | Facility: CLINIC | Age: 55
End: 2020-01-07

## 2020-01-09 ENCOUNTER — TELEPHONE (OUTPATIENT)
Dept: INTERNAL MEDICINE | Facility: CLINIC | Age: 55
End: 2020-01-09

## 2020-01-09 ENCOUNTER — TELEPHONE (OUTPATIENT)
Dept: PRIMARY CARE CLINIC | Facility: CLINIC | Age: 55
End: 2020-01-09

## 2020-01-09 NOTE — TELEPHONE ENCOUNTER
----- Message from Malka Morales sent at 1/9/2020  1:14 PM CST -----  Contact: Wife Mrs Gamboa  Pt's Wife Mrs Castrejon would like a call back from the nurse today to schedule a new pt appt for the pt on Jan 13th or 14th, 2020. Pt had a heart procedure in [place] and needs to follow up with a PCP.  Pt can be reached at [phone].

## 2020-01-09 NOTE — TELEPHONE ENCOUNTER
lvm that we did not call him  But that he does have an appt set up with Dr. Johnson in June  Also told them there was a msg but not from our clinic that states lvm to offer fu appt from [facility].

## 2020-01-09 NOTE — TELEPHONE ENCOUNTER
----- Message from Malka Morales sent at 1/9/2020  1:08 PM CST -----  Contact: Pt's Wife Mrs Gamboa  Pt missed a call from the Dr's Office and would like a return call from the nurse at [phone]

## 2020-01-09 NOTE — TELEPHONE ENCOUNTER
----- Message from Malka Morales sent at 1/9/2020  1:14 PM CST -----  Contact: Wife Mrs Gamboa  Pt's Wife Mrs Castrejon would like a call back from the nurse today to schedule a new pt appt for the pt on Jan 13th or 14th, 2020. Pt had a heart procedure in [place] and needs to follow up with a PCP.  Pt can be reached at [phone].

## 2020-01-10 ENCOUNTER — TELEPHONE (OUTPATIENT)
Dept: CARDIOLOGY | Facility: CLINIC | Age: 55
End: 2020-01-10

## 2020-01-10 NOTE — TELEPHONE ENCOUNTER
----- Message from Herminia Kelly sent at 1/10/2020  7:59 AM CST -----  Contact: [employer]  Message     Appointment Request From: Arpan Gamboa    With Provider: Jose    Preferred Date Range: 1/13/2020 - 1/14/2020    Preferred Times: Any time    Reason for visit: Existing Patient    Comments:  Follow-up visit after ablation procedure performed on 1/6/2020 at the [facility]

## 2020-01-13 ENCOUNTER — OFFICE VISIT (OUTPATIENT)
Dept: INTERNAL MEDICINE | Facility: CLINIC | Age: 55
End: 2020-01-13
Payer: COMMERCIAL

## 2020-01-13 VITALS
SYSTOLIC BLOOD PRESSURE: 126 MMHG | BODY MASS INDEX: 24.94 KG/M2 | OXYGEN SATURATION: 98 % | HEIGHT: 71 IN | HEART RATE: 74 BPM | WEIGHT: 178.13 LBS | DIASTOLIC BLOOD PRESSURE: 70 MMHG

## 2020-01-13 DIAGNOSIS — I45.6 WOLFF-PARKINSON-WHITE SYNDROME: Primary | ICD-10-CM

## 2020-01-13 DIAGNOSIS — R10.31 RIGHT INGUINAL PAIN: ICD-10-CM

## 2020-01-13 PROBLEM — J20.9 ACUTE BRONCHITIS, UNSPECIFIED: Status: RESOLVED | Noted: 2019-01-11 | Resolved: 2020-01-13

## 2020-01-13 PROCEDURE — 99215 PR OFFICE/OUTPT VISIT, EST, LEVL V, 40-54 MIN: ICD-10-PCS | Mod: S$GLB,,, | Performed by: INTERNAL MEDICINE

## 2020-01-13 PROCEDURE — 99999 PR PBB SHADOW E&M-EST. PATIENT-LVL III: CPT | Mod: PBBFAC,,, | Performed by: INTERNAL MEDICINE

## 2020-01-13 PROCEDURE — 3008F BODY MASS INDEX DOCD: CPT | Mod: CPTII,S$GLB,, | Performed by: INTERNAL MEDICINE

## 2020-01-13 PROCEDURE — 3008F PR BODY MASS INDEX (BMI) DOCUMENTED: ICD-10-PCS | Mod: CPTII,S$GLB,, | Performed by: INTERNAL MEDICINE

## 2020-01-13 PROCEDURE — 99215 OFFICE O/P EST HI 40 MIN: CPT | Mod: S$GLB,,, | Performed by: INTERNAL MEDICINE

## 2020-01-13 PROCEDURE — 99999 PR PBB SHADOW E&M-EST. PATIENT-LVL III: ICD-10-PCS | Mod: PBBFAC,,, | Performed by: INTERNAL MEDICINE

## 2020-01-13 RX ORDER — ASPIRIN 325 MG
325 TABLET ORAL
COMMUNITY
Start: 2020-01-08 | End: 2020-06-09

## 2020-01-13 NOTE — PROGRESS NOTES
Subjective:       Patient ID: Arpan Gamboa is a 54 y.o. male who  has a past medical history of Allergy, Asthma, Sleep apnea, and Tachycardia.    Chief Complaint: Follow-up (Pt had cardiac ablation last Monday)     History was obtained from the patient and supplemented through chart review.  He was previously seen by Dr. Shaheed Garcia  for WPW.  -Reviewed outside records from [facility] regarding WPW    Originally from [facility].  Does office work.    HPI    WPW:  Dx with WPW at age 16 and treated with propranolol.  Episodes of palpitations, lightheadedness 4 per year, lasting 10-20 minutes, often >200 HR.  No syncope, CP, SOB.  TTE WNL.  Holter with many PVCs.  Required adenosine .  S/p ablation on 01/06/2020 the [facility].  Was told stop Verapamil.  Did a lot of walking in the airport home, resulting in some fatigue, SOB 2 days ago.  Sx resolved.  No palpitations, CP, lightheadedness, dizziness, syncope.  Has appt with [facility].    R groin pain:  3/10 R groin pain with prolonged sitting d/t procedure.  L side with slight discomfort.  Had b/l approach.  +resolving ecchymosis.  Has b/l nodules at the sites.  No bleeding, hematoma.  Lab Results   Component Value Date    TSH 0.872 12/20/2019             Not addressed today.  AMANDEEP:  On CPAP.  BMI 24.    Mild intermittent Asthma:      AR:  ENT recommended nasal saline in the past.    GERD:  History of EGD 20 years ago.  No longer on Protonix daily.  Did not have H pylori test done.  Has appointment with GI.    HLD:  Total cholesterol mildly elevated at 210.  Lab Results   Component Value Date    LDLCALC 133.6 12/20/2019     The 10-year ASCVD risk score (Aman CHRISTOPH Jr., et al., 2013) is: 4.2%    Values used to calculate the score:      Age: 54 years      Sex: Male      Is Non- : No      Diabetic: No      Tobacco smoker: No      Systolic Blood Pressure: 126 mmHg      Is BP treated: No      HDL Cholesterol: 58  mg/dL      Total Cholesterol: 210 mg/dL    Vitamin D deficiency:  Mildly low.   Lab Results   Component Value Date    DKHPNCEY84GA 27 (L) 12/20/2019     Seborrheic dermatitis:  Derm Rx ketoconazole shampoo.    Right meniscal tear:  MRI with meniscal tear.  Following with [facility].  Considering PT or scope.    Decreased libido:  Also with increased stress.  No ED.  Testosterone level WNL.    Used to run marathons until knee issues for 2 years.    Review of Systems   Constitutional: Negative for fever and unexpected weight change.   HENT: Negative for rhinorrhea and sneezing.    Eyes: Negative for redness and itching.   Respiratory: Negative for shortness of breath and wheezing.    Cardiovascular: Negative for chest pain and palpitations.   Gastrointestinal: Negative for abdominal pain and vomiting.   Genitourinary: Negative for dysuria and hematuria.   Musculoskeletal: Negative for gait problem and joint swelling.   Skin: Positive for color change. Negative for rash.   Neurological: Negative for dizziness and light-headedness.   Hematological: Negative for adenopathy.   Psychiatric/Behavioral: Negative for confusion. The patient is not nervous/anxious.          Past Medical History:   Diagnosis Date    Allergy     Asthma     Sleep apnea     Tachycardia     WPW     Past Surgical History:   Procedure Laterality Date    CARDIAC ELECTROPHYSIOLOGY [facility]  [facility]    hernia repair at 3 yrs old       Family History   Problem Relation Age of Onset    Hypertension Mother     Prostatitis Father     COPD Father     Lupus Sister     Hypertension Sister     Heart attack Other     Melanoma Neg Hx      Social History     Socioeconomic History    Marital status:      Spouse name: Not on file    Number of children: Not on file    Years of education: Not on file    Highest education level: Not on file   Occupational History    Not on file   Social Needs     "Financial resource strain: Not on file    Food insecurity:     Worry: Not on file     Inability: Not on file    Transportation needs:     Medical: Not on file     Non-medical: Not on file   Tobacco Use    Smoking status: Never Smoker    Smokeless tobacco: Never Used   Substance and Sexual Activity    Alcohol use: Yes     Alcohol/week: 0.0 standard drinks     Comment: socially    Drug use: Not on file    Sexual activity: Not on file   Lifestyle    Physical activity:     Days per week: Not on file     Minutes per session: Not on file    Stress: Not on file   Relationships    Social connections:     Talks on phone: Not on file     Gets together: Not on file     Attends [employer] service: Not on file     Active member of club or organization: Not on file     Attends meetings of clubs or organizations: Not on file     Relationship status: Not on file   Other Topics Concern    Not on file   Social History Narrative    Not on file     Objective:      Vitals:    01/13/20 1310   BP: 126/70   Pulse: 74   SpO2: 98%   Weight: 80.8 kg (178 lb 2.1 oz)   Height: 5' 11" (1.803 m)      Physical Exam   Constitutional: He appears well-developed and well-nourished. No distress.   HENT:   Head: Normocephalic and atraumatic.   Nose: Nose normal.   Mouth/Throat: Oropharynx is clear and moist. No oropharyngeal exudate.   Eyes: Pupils are equal, round, and reactive to light. EOM are normal. Right eye exhibits no discharge. Left eye exhibits no discharge. No scleral icterus.   Neck: Neck supple. No tracheal deviation present. No thyromegaly present.   Cardiovascular: Normal rate, regular rhythm, normal heart sounds and intact distal pulses.   No murmur heard.  Pulmonary/Chest: Effort normal and breath sounds normal. No respiratory distress. He has no wheezes.   Abdominal: Soft. Bowel sounds are normal. He exhibits no distension. There is no tenderness.   Musculoskeletal: He exhibits no edema or deformity.   Lymphadenopathy:     " He has no cervical adenopathy.   Neurological: He is alert. No cranial nerve deficit. Gait normal.   Skin: Skin is warm and dry. He is not diaphoretic. No erythema.        Psychiatric: He has a normal mood and affect. His behavior is normal.         Lab Results   Component Value Date    WBC 5.87 12/20/2019    HGB 15.2 12/20/2019    HCT 46.5 12/20/2019     12/20/2019    CHOL 210 (H) 12/20/2019    TRIG 92 12/20/2019    HDL 58 12/20/2019    ALT 25 12/20/2019    AST 26 12/20/2019     09/07/2019    K 3.8 09/07/2019     09/07/2019    CREATININE 1.0 09/07/2019    BUN 14 09/07/2019    CO2 25 09/07/2019    TSH 0.872 12/20/2019    PSA 0.42 12/20/2019       The 10-year ASCVD risk score (Aman HAWTHORNE Jr., et al., 2013) is: 4.2%    Values used to calculate the score:      Age: 54 years      Sex: Male      Is Non- : No      Diabetic: No      Tobacco smoker: No      Systolic Blood Pressure: 126 mmHg      Is BP treated: No      HDL Cholesterol: 58 mg/dL      Total Cholesterol: 210 mg/dL    (Imaging have been independently reviewed)  MRI knee with meniscal tear    Assessment:       1. Sebastián-Parkinson-White syndrome    2. Right inguinal pain          Plan:       Arpan was seen today for follow-up.    Diagnoses and all orders for this visit:    Sebastián-Parkinson-White syndrome  Comments:  Doing well s/p ablation. Advised to [facility] app[facility].  Discussed ED precautions.    Right inguinal pain  Comments:  d/t ablation. Likely has hypertrophic/scarring; advised to see [facility]. Rec NSAIDs, warm compresses, gentle massage p.r.n.          Side effects of medication(s) were discussed in detail and patient voiced understanding.  Patient will call back for any issues or complications.     RTC in 3 month(s) or sooner PRN to establish care after Cardiology visits.  Needs to be a 40 minute visit.

## 2020-01-13 NOTE — LETTER
January 13, 2020      [facility] [place] [address] [facility] [address]  [facility] [address]  Phone: [phone]  Fax: [phone]       Patient: Arpan Gamboa   YOB: 1965  Date of Visit: 01/13/2020    To Whom It May Concern:    Danette Gamboa  was at [facility] on 01/13/2020. He may return to work/school on 1/14/2020 with restrictions of prolonged sitting as tolerated. If you have any questions or concerns, or if I can be of further assistance, please do not hesitate to contact me.    Sincerely,    Dr. Angeles Johnson

## 2020-01-15 ENCOUNTER — TELEPHONE (OUTPATIENT)
Dept: INTERNAL MEDICINE | Facility: CLINIC | Age: 55
End: 2020-01-15

## 2020-01-15 ENCOUNTER — PATIENT MESSAGE (OUTPATIENT)
Dept: INTERNAL MEDICINE | Facility: CLINIC | Age: 55
End: 2020-01-15

## 2020-01-15 DIAGNOSIS — R10.31 RIGHT INGUINAL PAIN: Primary | ICD-10-CM

## 2020-01-15 NOTE — TELEPHONE ENCOUNTER
Spoke with pt in regards to scheduling ultrasound. Pt stated his wife scheduled his appt for 1/16/2020 @ 3pm [facility].

## 2020-01-15 NOTE — TELEPHONE ENCOUNTER
Spoke with pt wife in regards to ultrasound. Pt wife stated pt was seen by Dr. Johnson on 1/13/2020 for  Follow-up (  Pt had cardiac ablation on 1/6/2020). Pt has bilateral nodules in groin area at site where catheter was inserted. Pt was told by provider that he likely has scarring. Pt is concerned and wants a ultrasound of groin area to rule out blood clots. Soft tissue ultrasound  pend and routed.

## 2020-01-15 NOTE — TELEPHONE ENCOUNTER
----- Message from Lissy Arthur sent at 1/15/2020  9:44 AM CST -----  Contact: DORINA CABRERA [[id number]]  Name of Who is Calling : DORINA CABRERA [[id number]]    Patient is requesting a call from staff in regards to getting an ultrasound ordered    .....Please contact to further discuss and advise.    Can the clinic reply by [facility] : No    What Number to Call Back :  [phone]

## 2020-01-16 ENCOUNTER — HOSPITAL ENCOUNTER (OUTPATIENT)
Dept: RADIOLOGY | Facility: OTHER | Age: 55
Discharge: HOME | End: 2020-01-16
Attending: INTERNAL MEDICINE
Payer: COMMERCIAL

## 2020-01-16 DIAGNOSIS — R10.31 RIGHT INGUINAL PAIN: ICD-10-CM

## 2020-01-16 PROCEDURE — 93970 EXTREMITY STUDY: CPT | Mod: TC

## 2020-01-16 PROCEDURE — 93970 US LOWER EXTREMITY VEINS BILATERAL: ICD-10-PCS | Mod: 26,,, | Performed by: RADIOLOGY

## 2020-01-16 PROCEDURE — 93970 EXTREMITY STUDY: CPT | Mod: 26,,, | Performed by: RADIOLOGY

## 2020-02-05 ENCOUNTER — OFFICE VISIT (OUTPATIENT)
Dept: CARDIOLOGY | Facility: CLINIC | Age: 55
End: 2020-02-05
Attending: INTERNAL MEDICINE
Payer: COMMERCIAL

## 2020-02-05 VITALS
BODY MASS INDEX: 24.78 KG/M2 | WEIGHT: 177 LBS | SYSTOLIC BLOOD PRESSURE: 114 MMHG | DIASTOLIC BLOOD PRESSURE: 65 MMHG | HEIGHT: 71 IN | HEART RATE: 78 BPM

## 2020-02-05 DIAGNOSIS — I45.6 WOLFF-PARKINSON-WHITE SYNDROME: ICD-10-CM

## 2020-02-05 DIAGNOSIS — G47.33 OBSTRUCTIVE SLEEP APNEA ON CPAP: ICD-10-CM

## 2020-02-05 PROCEDURE — 93000 ELECTROCARDIOGRAM COMPLETE: CPT | Mod: S$GLB,,, | Performed by: INTERNAL MEDICINE

## 2020-02-05 PROCEDURE — 3008F PR BODY MASS INDEX (BMI) DOCUMENTED: ICD-10-PCS | Mod: CPTII,S$GLB,, | Performed by: INTERNAL MEDICINE

## 2020-02-05 PROCEDURE — 99214 PR OFFICE/OUTPT VISIT, EST, LEVL IV, 30-39 MIN: ICD-10-PCS | Mod: 25,S$GLB,, | Performed by: INTERNAL MEDICINE

## 2020-02-05 PROCEDURE — 3008F BODY MASS INDEX DOCD: CPT | Mod: CPTII,S$GLB,, | Performed by: INTERNAL MEDICINE

## 2020-02-05 PROCEDURE — 93000 PR ELECTROCARDIOGRAM, COMPLETE: ICD-10-PCS | Mod: S$GLB,,, | Performed by: INTERNAL MEDICINE

## 2020-02-05 PROCEDURE — 99214 OFFICE O/P EST MOD 30 MIN: CPT | Mod: 25,S$GLB,, | Performed by: INTERNAL MEDICINE

## 2020-02-05 NOTE — PROGRESS NOTES
Subjective:     Arpan Gamboa is a 54 y.o. male from [facility]. He was initially diagnosed with WPW at age 16 when in [facility]. He was given propranolol but had frequent tachycardias. During 1987 and 2006 her essentially did not have any spells but since essentially yearly spellls of tachycardias. In 2007 he was admitted to a hospital in [place] with a tachycardia and then had a heart rate of 260 bpm. He is currently living in [place]. He eventually decided to have an ablation and on 1/6/2020 he had an ablation of a left lateral pathway for AVRT. No exertional chest pain or exertional dyspnea. Feeling well overall. He describes a history of that an intermittent delta wave have been seen.       Palpitations    This is a recurrent problem. The current episode started more than 1 year ago. The problem has been resolved. On average, each episode lasts 20 minutes. Nothing aggravates the symptoms. Pertinent negatives include no anxiety, chest fullness, chest pain, coughing, diaphoresis, dizziness, fever, irregular heartbeat, malaise/fatigue, nausea, near-syncope, numbness, shortness of breath, syncope, vomiting or weakness. He has tried beta blockers for the symptoms.       Review of Systems   Constitution: Negative for chills, diaphoresis, fever and malaise/fatigue.   HENT: Negative for nosebleeds.    Eyes: Negative for double vision, vision loss in left eye and vision loss in right eye.   Cardiovascular: Negative for chest pain, claudication, dyspnea on exertion, irregular heartbeat, leg swelling, near-syncope, orthopnea, palpitations, paroxysmal nocturnal dyspnea and syncope.   Respiratory: Positive for sleep disturbances due to breathing. Negative for cough, hemoptysis, shortness of breath and wheezing.    Endocrine: Negative for cold intolerance and heat intolerance.   Hematologic/Lymphatic: Negative for bleeding problem. Does not bruise/bleed easily.   Skin: Negative for color change and rash.  "  Musculoskeletal: Negative for back pain, falls, muscle weakness and myalgias.   Gastrointestinal: Negative for heartburn, hematemesis, hematochezia, hemorrhoids, jaundice, melena, nausea and vomiting.   Genitourinary: Negative for dysuria and hematuria.   Neurological: Negative for dizziness, focal weakness, headaches, light-headedness, loss of balance, numbness, vertigo and weakness.   Psychiatric/Behavioral: Negative for altered mental status, depression and memory loss. The patient is not nervous/anxious.    Allergic/Immunologic: Negative for hives and persistent infections.       Current Outpatient Medications on File Prior to Visit   Medication Sig Dispense Refill    albuterol (VENTOLIN HFA) 90 mcg/actuation inhaler Inhale 2 puffs into the lungs daily as needed. 18 g 1    aspirin 325 MG tablet Take 325 mg by mouth.      ketoconazole (NIZORAL) 2 % shampoo Lather scalp for 5-10 min, then rinse. Use 2-3 times per week. 240 mL 3    loratadine (CLARITIN) 10 mg tablet Take 10 mg by mouth daily as needed for Allergies.      pantoprazole (PROTONIX) 20 MG tablet TAKE 1 TABLET BY MOUTH EVERY DAY 90 tablet 0    triamcinolone acetonide 0.1% (KENALOG) 0.1 % cream Apply to affected areas of SCALP bid prn itching/flaking. 80 g 0    oxybutynin (DITROPAN XL) 10 MG 24 hr tablet Take 10 mg by mouth once daily.      verapamil (VERELAN) 120 MG C24P Take 120 mg by mouth once daily.       No current facility-administered medications on file prior to visit.        /65   Pulse 78   Ht 5' 11" (1.803 m)   Wt 80.3 kg (177 lb)   BMI 24.69 kg/m²       Objective:     Physical Exam   Constitutional: He is oriented to person, place, and time. He appears well-developed and well-nourished.  Non-toxic appearance. No distress.   HENT:   Head: Normocephalic and atraumatic.   Nose: Nose normal.   Eyes: Right eye exhibits no discharge. Left eye exhibits no discharge. Right conjunctiva is not injected. Left conjunctiva is not " injected. Right pupil is round. Left pupil is round. Pupils are equal.   Neck: Neck supple. No JVD present. Carotid bruit is not present. No thyromegaly present.   Cardiovascular: Normal rate, regular rhythm, S1 normal and S2 normal.  No extrasystoles are present. PMI is not displaced. Exam reveals no gallop.   Pulses:       Radial pulses are 2+ on the right side, and 2+ on the left side.        Femoral pulses are 2+ on the right side, and 2+ on the left side.       Dorsalis pedis pulses are 2+ on the right side, and 2+ on the left side.        Posterior tibial pulses are 2+ on the right side, and 2+ on the left side.   Pulmonary/Chest: Effort normal and breath sounds normal.   Abdominal: Soft. Normal appearance. There is no hepatosplenomegaly. There is no tenderness.   Musculoskeletal:        Right ankle: He exhibits no swelling, no ecchymosis and no deformity.        Left ankle: He exhibits no swelling, no ecchymosis and no deformity.   Lymphadenopathy:        Head (right side): No submandibular adenopathy present.        Head (left side): No submandibular adenopathy present.     He has no cervical adenopathy.   Neurological: He is alert and oriented to person, place, and time. He is not disoriented. No cranial nerve deficit or sensory deficit.   Skin: Skin is warm, dry and intact. No rash noted. He is not diaphoretic.   Psychiatric: He has a normal mood and affect. His speech is normal and behavior is normal. Judgment and thought content normal. Cognition and memory are normal.       Assessment:     1. Sebastián-Parkinson-White syndrome    2. Obstructive sleep apnea on CPAP        Plan:     1. Sebastián Parkinson White Syndrome   1981: Diagnosed.   9608-3085: No tachycardia.   2007: Tachycardia at 280 bpm.   4596-4161: Tachycardia yearly.   5/8/2017: ECG: SR. Probably small delta wave in V3-V4.   5/23/2017: Holter: Sinus rhythm 77 () bpm. No clear deltawave seen. Frequent VPCs including couplets and  triplets.   5/18/2017: Echo: Normal left ventricular size and systolic function.   1/6/2020: [facility]: AVRT left lateral ablation.   Hopefully cured.    2. Sleep Apnea   2017: Diagnosed. Using CPAP.    3. Primary Care   Dr. Angeles Johnson..    F/u 12 months.    Conor Garcia M.D.

## 2020-02-06 DIAGNOSIS — R10.84 GENERALIZED ABDOMINAL PAIN: ICD-10-CM

## 2020-02-10 RX ORDER — PANTOPRAZOLE SODIUM 20 MG/1
TABLET, DELAYED RELEASE ORAL
Qty: 90 TABLET | Refills: 0 | OUTPATIENT
Start: 2020-02-10

## 2020-02-12 ENCOUNTER — TELEPHONE (OUTPATIENT)
Dept: INTERNAL MEDICINE | Facility: CLINIC | Age: 55
End: 2020-02-12

## 2020-02-17 ENCOUNTER — TELEPHONE (OUTPATIENT)
Dept: OPTOMETRY | Facility: CLINIC | Age: 55
End: 2020-02-17

## 2020-02-17 NOTE — TELEPHONE ENCOUNTER
Faxed denial for FML refill to [facility], [phone]/ encl. msg that if pt is having pblms, needs to be seen/ DLS- 8/10/2018

## 2020-02-28 ENCOUNTER — PATIENT MESSAGE (OUTPATIENT)
Dept: SLEEP MEDICINE | Facility: CLINIC | Age: 55
End: 2020-02-28

## 2020-03-02 ENCOUNTER — PATIENT OUTREACH (OUTPATIENT)
Dept: ADMINISTRATIVE | Facility: OTHER | Age: 55
End: 2020-03-02

## 2020-03-13 ENCOUNTER — PATIENT MESSAGE (OUTPATIENT)
Dept: SLEEP MEDICINE | Facility: CLINIC | Age: 55
End: 2020-03-13

## 2020-03-16 ENCOUNTER — PATIENT OUTREACH (OUTPATIENT)
Dept: ADMINISTRATIVE | Facility: OTHER | Age: 55
End: 2020-03-16

## 2020-03-17 NOTE — PROGRESS NOTES
Chart reviewed.   Immunizations: Triggered Imm Registry     Orders placed: n/a  Upcoming appts to satisfy SCAR topics: n/a

## 2020-05-14 ENCOUNTER — PATIENT OUTREACH (OUTPATIENT)
Dept: ADMINISTRATIVE | Facility: OTHER | Age: 55
End: 2020-05-14

## 2020-05-19 ENCOUNTER — PATIENT OUTREACH (OUTPATIENT)
Dept: ADMINISTRATIVE | Facility: HOSPITAL | Age: 55
End: 2020-05-19

## 2020-06-09 ENCOUNTER — TELEPHONE (OUTPATIENT)
Dept: INTERNAL MEDICINE | Facility: CLINIC | Age: 55
End: 2020-06-09

## 2020-06-09 ENCOUNTER — OFFICE VISIT (OUTPATIENT)
Dept: INTERNAL MEDICINE | Facility: CLINIC | Age: 55
End: 2020-06-09
Attending: INTERNAL MEDICINE
Payer: COMMERCIAL

## 2020-06-09 DIAGNOSIS — Z00.00 HEALTHCARE MAINTENANCE: ICD-10-CM

## 2020-06-09 DIAGNOSIS — Z11.4 SCREENING FOR HIV (HUMAN IMMUNODEFICIENCY VIRUS): ICD-10-CM

## 2020-06-09 DIAGNOSIS — J30.1 ALLERGIC RHINITIS DUE TO POLLEN, UNSPECIFIED SEASONALITY: ICD-10-CM

## 2020-06-09 DIAGNOSIS — N40.1 BENIGN PROSTATIC HYPERPLASIA WITH NOCTURIA: ICD-10-CM

## 2020-06-09 DIAGNOSIS — R35.1 BENIGN PROSTATIC HYPERPLASIA WITH NOCTURIA: ICD-10-CM

## 2020-06-09 DIAGNOSIS — E78.00 ELEVATED CHOLESTEROL: ICD-10-CM

## 2020-06-09 DIAGNOSIS — M79.642 PAIN OF LEFT HAND: ICD-10-CM

## 2020-06-09 DIAGNOSIS — H93.13 TINNITUS OF BOTH EARS: Primary | ICD-10-CM

## 2020-06-09 DIAGNOSIS — E55.9 VITAMIN D DEFICIENCY: ICD-10-CM

## 2020-06-09 DIAGNOSIS — Z13.1 ENCOUNTER FOR SCREENING FOR DIABETES MELLITUS: ICD-10-CM

## 2020-06-09 PROBLEM — R19.7 DIARRHEA: Status: RESOLVED | Noted: 2019-05-27 | Resolved: 2020-06-09

## 2020-06-09 PROBLEM — K52.9 GASTROENTERITIS: Status: RESOLVED | Noted: 2019-05-27 | Resolved: 2020-06-09

## 2020-06-09 PROBLEM — M23.206 OLD TEAR OF MENISCUS OF RIGHT KNEE: Status: ACTIVE | Noted: 2018-06-27

## 2020-06-09 PROBLEM — R10.9 ABDOMINAL PAIN: Status: RESOLVED | Noted: 2019-05-27 | Resolved: 2020-06-09

## 2020-06-09 PROBLEM — R06.83 SNORING: Status: RESOLVED | Noted: 2019-01-11 | Resolved: 2020-06-09

## 2020-06-09 PROBLEM — R10.10 UPPER ABDOMINAL PAIN: Status: RESOLVED | Noted: 2019-01-11 | Resolved: 2020-06-09

## 2020-06-09 PROBLEM — K21.9 GERD (GASTROESOPHAGEAL REFLUX DISEASE): Status: ACTIVE | Noted: 2020-06-09

## 2020-06-09 PROBLEM — R00.2 PALPITATIONS: Status: RESOLVED | Noted: 2018-05-04 | Resolved: 2020-06-09

## 2020-06-09 PROCEDURE — 99214 PR OFFICE/OUTPT VISIT, EST, LEVL IV, 30-39 MIN: ICD-10-PCS | Mod: 95,,, | Performed by: INTERNAL MEDICINE

## 2020-06-09 PROCEDURE — 99214 OFFICE O/P EST MOD 30 MIN: CPT | Mod: 95,,, | Performed by: INTERNAL MEDICINE

## 2020-06-09 RX ORDER — TAMSULOSIN HYDROCHLORIDE 0.4 MG/1
0.4 CAPSULE ORAL DAILY
Qty: 30 CAPSULE | Refills: 11 | Status: SHIPPED | OUTPATIENT
Start: 2020-06-09 | End: 2020-09-16

## 2020-06-09 NOTE — PROGRESS NOTES
The patient location is: work  The chief complaint leading to consultation is: otalgia.  The patient elected for a virtual visit to decrease risk of exposure to Coronavirus.  Visit type: Virtual visit with synchronous audio and video  Total time spent with patient: 15 minutes  Each patient to whom he or she provides medical services by telemedicine is:  (1) informed of the relationship between the physician and patient and the respective role of any other health care provider with respect to management of the patient; and (2) notified that he or she may decline to receive medical services by telemedicine and may withdraw from such care at any time.    Subjective:       Patient ID: Arpan Gamboa is a 54 y.o. male who  has a past medical history of Allergy, Asthma, Sleep apnea, and Tachycardia.    Chief Complaint: Otalgia     History was obtained from the patient and supplemented through chart review.  He was previously seen by Dr. Shaheed Garcia  for WPW.  -Reviewed outside records from [facility] regarding WPW  -discussed care with Radiology.    Originally from [facility].  Does office work.    Otalgia    There is pain in both ears. This is a new problem. The current episode started in the past 7 days. The problem occurs constantly. The problem has been waxing and waning. There has been no fever. The fever has been present for less than 1 day. The pain is at a severity of 2/10. The pain is mild. Associated symptoms include headaches. Pertinent negatives include no abdominal pain, coughing, diarrhea, drainage, ear discharge, hearing loss, neck pain, rash, rhinorrhea, sore throat or vomiting. He has tried nothing for the symptoms. The treatment provided no relief. There is no history of a chronic ear infection, hearing loss or a tympanostomy tube.     Has has a few bifrontal HA in the last few weeks.  Continuous b/l tinnitus x 1 week with slight otalgia b/l.  Mostly occurs the entire day.  No vertigo,  decreased hearing.  No fever, d/c.  Does not swim.  Inserts Q tips in his ears intermitently.  Has been working from home with many virtual meetings and is wearing a headset.    AR:   Chronic, but none in the last 2 weeks.  Allergies to dust and was cleaning recently. No cough, sore throat.  Takes Claritin PRN.  ENT recommended nasal saline in the past.    Paresthesias:  Is R handed.  Tingling at digit 3 of L hand x 3 weeks.  Noticed this while cooking.  Was using a collander and had applied a lot of pressure on his 3rd finger without realizing, which caused numbness and weakness.  Since then, 2 weeks ago, has continuous numbness that is moving into his palm.  Also notices numbness and hand rigidity, which  alway occur in the AM that resolves during the day.      BPH:  Urinary urgency, nocturia 3-4/night.  Was taking unknown med in the past.  Recurrent.  Does drink water at night.    Elevated cholesterol:  Total cholesterol mildly elevated at 210  Lab Results   Component Value Date    LDLCALC 133.6 12/20/2019     The 10-year ASCVD risk score (Ravalli CHRISTOPH Jr., et al., 2013) is: 3.5%    Values used to calculate the score:      Age: 54 years      Sex: Male      Is Non- : No      Diabetic: No      Tobacco smoker: No      Systolic Blood Pressure: 114 mmHg      Is BP treated: No      HDL Cholesterol: 58 mg/dL      Total Cholesterol: 210 mg/dL    Vitamin D deficiency:  Mildly low.   Lab Results   Component Value Date    HQBPKKER61ND 27 (L) 12/20/2019               Not addressed today.  WPW:  Dx with WPW at age 16 and treated with propranolol.  Episodes of palpitations, lightheadedness 4 per year, lasting 10-20 minutes, often >200 HR.  No syncope, CP, SOB.  TTE WNL.  Holter with many PVCs.  Required adenosine .  S/p ablation on 01/06/2020 the [facility].  Was told stop Verapamil.  Did a lot of walking in the airport home, resulting in some fatigue, SOB 2 days ago.  Sx resolved.  No  palpitations, CP, lightheadedness, dizziness, syncope.      Following with Cardiology at [facility].  Recommended following up with EP p.r.n. and cardiology yearly.   Doing well after ablation.  Following with cardiology yearly.  Lab Results   Component Value Date    TSH 0.872 12/20/2019     AMANDEEP:  On CPAP.  BMI 24.    Mild intermittent Asthma:      GERD:    History of EGD 20 years ago.  No longer on Protonix daily.  Did not have H pylori test done.  Has appointment with GI.    Not addressed today.  Seborrheic dermatitis:  Derm Rx ketoconazole shampoo.    Right meniscal tear:  Used to run marathons until knee issues for 2 years.  MRI with meniscal tear.  Following with [facility].  Considering PT or scope.    Decreased libido:  Also with increased stress.  No ED.  Testosterone level WNL.    Review of Systems   Constitutional: Negative for fever and unexpected weight change.   HENT: Positive for ear pain. Negative for ear discharge, hearing loss, rhinorrhea, sneezing and sore throat.    Eyes: Negative for redness and itching.   Respiratory: Negative for cough, shortness of breath and wheezing.    Cardiovascular: Negative for chest pain and palpitations.   Gastrointestinal: Negative for abdominal pain, diarrhea and vomiting.   Genitourinary: Positive for urgency. Negative for dysuria and hematuria.   Musculoskeletal: Negative for gait problem, joint swelling and neck pain.   Skin: Negative for color change and rash.   Neurological: Positive for numbness and headaches. Negative for dizziness, weakness and light-headedness.   Hematological: Negative for adenopathy.   Psychiatric/Behavioral: Negative for confusion. The patient is not nervous/anxious.        I personally reviewed Past Medical History, Past Surgical History, Social History, and Family History.    Objective:      There were no vitals filed for this visit.   Physical Exam   Constitutional: He appears well-developed and  well-nourished. No distress.   HENT:   Head: Normocephalic and atraumatic.   Eyes: Right eye exhibits no discharge. Left eye exhibits no discharge.   Pulmonary/Chest: Effort normal. No respiratory distress.   Speaking in complete sentences.   Musculoskeletal:   Negative Phalen   Neurological: He is alert.   Skin: He is not diaphoretic. No erythema. No pallor.   Psychiatric: He has a normal mood and affect. His behavior is normal.         Lab Results   Component Value Date    WBC 5.87 12/20/2019    HGB 15.2 12/20/2019    HCT 46.5 12/20/2019     12/20/2019    CHOL 210 (H) 12/20/2019    TRIG 92 12/20/2019    HDL 58 12/20/2019    ALT 25 12/20/2019    AST 26 12/20/2019     09/07/2019    K 3.8 09/07/2019     09/07/2019    CREATININE 1.0 09/07/2019    BUN 14 09/07/2019    CO2 25 09/07/2019    TSH 0.872 12/20/2019    PSA 0.42 12/20/2019       The 10-year ASCVD risk score (Aman CHRISTOPH Jr., et al., 2013) is: 3.5%    Values used to calculate the score:      Age: 54 years      Sex: Male      Is Non- : No      Diabetic: No      Tobacco smoker: No      Systolic Blood Pressure: 114 mmHg      Is BP treated: No      HDL Cholesterol: 58 mg/dL      Total Cholesterol: 210 mg/dL    (Imaging have been independently reviewed)  Ultrasound without DVT, hematoma.    Assessment:       1. Tinnitus of both ears    2. Allergic rhinitis due to pollen, unspecified seasonality    3. Pain of left hand    4. Benign prostatic hyperplasia with nocturia    5. Elevated cholesterol    6. Vitamin D deficiency    7. Encounter for screening for diabetes mellitus    8. Screening for HIV (human immunodeficiency virus)    9. Healthcare maintenance          Plan:       Arpan was seen today for otalgia.    Diagnoses and all orders for this visit:    Tinnitus of both ears  Comments:  No infx sx, vertigo, worsened AR. Needs ear exam. Refer to ENT. Advised to avoid Qtips.  Orders:  -     Ambulatory referral/consult to ENT;  Future    Allergic rhinitis due to pollen, unspecified seasonality  Comments:  Doing well.  Continue antihistamine p.r.n..    Pain of left hand  Comments:  DDx CTS, nerve compression.  Advised wrist splints q.h.s..  Refer to Hand Clinic.  Orders:  -     Ambulatory referral/consult to Orthopedics; Future    Benign prostatic hyperplasia with nocturia  Comments:  Start Flomax.  Discussed side effects.  Orders:  -     tamsulosin (FLOMAX) 0.4 mg Cap; Take 1 capsule (0.4 mg total) by mouth once daily.    Elevated cholesterol  Comments:  Borderline high.  Not on statin.  Check FLP with labs in 6 months.  Orders:  -     Lipid Panel; Future    Vitamin D deficiency  Comments:  Mildly low.  Check vitamin-D with labs in 6 months.  Orders:  -     Vitamin D; Future    Encounter for screening for diabetes mellitus  Comments:  Labs in 6 months.  Orders:  -     Hemoglobin A1C; Future    Screening for HIV (human immunodeficiency virus)  -     HIV 1/2 Ag/Ab (4th Gen); Future    Healthcare maintenance  -     CBC auto differential; Future  -     Comprehensive metabolic panel; Future    Other orders  -     Cancel: Ambulatory referral/consult to Gastroenterology; Future         Side effects of medication(s) were discussed in detail and patient voiced understanding.  Patient will call back for any issues or complications.     RTC in 6 month(s) or sooner PRN to establish care with labs prior.

## 2020-06-15 ENCOUNTER — PATIENT OUTREACH (OUTPATIENT)
Dept: ADMINISTRATIVE | Facility: OTHER | Age: 55
End: 2020-06-15

## 2020-06-15 DIAGNOSIS — Z12.11 COLON CANCER SCREENING: Primary | ICD-10-CM

## 2020-06-16 ENCOUNTER — CLINICAL SUPPORT (OUTPATIENT)
Dept: AUDIOLOGY | Facility: CLINIC | Age: 55
End: 2020-06-16
Payer: COMMERCIAL

## 2020-06-16 ENCOUNTER — OFFICE VISIT (OUTPATIENT)
Dept: OTOLARYNGOLOGY | Facility: CLINIC | Age: 55
End: 2020-06-16
Payer: COMMERCIAL

## 2020-06-16 VITALS — WEIGHT: 170.19 LBS | BODY MASS INDEX: 23.74 KG/M2

## 2020-06-16 DIAGNOSIS — H90.3 BILATERAL HIGH FREQUENCY SENSORINEURAL HEARING LOSS: Primary | ICD-10-CM

## 2020-06-16 DIAGNOSIS — H93.13 TINNITUS OF BOTH EARS: ICD-10-CM

## 2020-06-16 DIAGNOSIS — H93.13 TINNITUS, BILATERAL: Primary | ICD-10-CM

## 2020-06-16 PROCEDURE — 92557 COMPREHENSIVE HEARING TEST: CPT | Mod: S$GLB,,, | Performed by: AUDIOLOGIST

## 2020-06-16 PROCEDURE — 99999 PR PBB SHADOW E&M-EST. PATIENT-LVL I: CPT | Mod: PBBFAC,,, | Performed by: AUDIOLOGIST

## 2020-06-16 PROCEDURE — 3008F PR BODY MASS INDEX (BMI) DOCUMENTED: ICD-10-PCS | Mod: CPTII,S$GLB,, | Performed by: OTOLARYNGOLOGY

## 2020-06-16 PROCEDURE — 92557 PR COMPREHENSIVE HEARING TEST: ICD-10-PCS | Mod: S$GLB,,, | Performed by: AUDIOLOGIST

## 2020-06-16 PROCEDURE — 99999 PR PBB SHADOW E&M-EST. PATIENT-LVL I: ICD-10-PCS | Mod: PBBFAC,,, | Performed by: AUDIOLOGIST

## 2020-06-16 PROCEDURE — 92567 TYMPANOMETRY: CPT | Mod: S$GLB,,, | Performed by: AUDIOLOGIST

## 2020-06-16 PROCEDURE — 99999 PR PBB SHADOW E&M-EST. PATIENT-LVL III: ICD-10-PCS | Mod: PBBFAC,,, | Performed by: OTOLARYNGOLOGY

## 2020-06-16 PROCEDURE — 99213 PR OFFICE/OUTPT VISIT, EST, LEVL III, 20-29 MIN: ICD-10-PCS | Mod: S$GLB,,, | Performed by: OTOLARYNGOLOGY

## 2020-06-16 PROCEDURE — 3008F BODY MASS INDEX DOCD: CPT | Mod: CPTII,S$GLB,, | Performed by: OTOLARYNGOLOGY

## 2020-06-16 PROCEDURE — 99999 PR PBB SHADOW E&M-EST. PATIENT-LVL III: CPT | Mod: PBBFAC,,, | Performed by: OTOLARYNGOLOGY

## 2020-06-16 PROCEDURE — 99213 OFFICE O/P EST LOW 20 MIN: CPT | Mod: S$GLB,,, | Performed by: OTOLARYNGOLOGY

## 2020-06-16 PROCEDURE — 92567 PR TYMPA2METRY: ICD-10-PCS | Mod: S$GLB,,, | Performed by: AUDIOLOGIST

## 2020-06-16 NOTE — PROGRESS NOTES
Subjective:       Patient ID: Arpan Gamboa is a 54 y.o. male.    Chief Complaint: Tinnitus    Ringing in Ears:   Chronicity:  New  Onset:  1 to 4 weeks ago  Progression since onset:  Unchanged  Frequency:  Constantly  Severity:  Moderate   Associated symptoms: ear pain and tinnitus.  No fever and no headaches.  Treatments tried:  Nothing   PMH includes: neurologic disease (anxiety of work) and noise exposure (uses head sets frequently at work).  No ear infections.      Review of Systems   Constitutional: Negative for chills, fever and unexpected weight change.   HENT: Positive for ear pain, hearing loss and tinnitus. Negative for sore throat and trouble swallowing.    Eyes: Negative for pain and visual disturbance.   Respiratory: Negative for apnea and shortness of breath.    Cardiovascular: Negative for chest pain and palpitations.   Gastrointestinal: Negative for abdominal pain and nausea.   Endocrine: Negative for cold intolerance and heat intolerance.   Genitourinary: Positive for frequency.   Musculoskeletal: Positive for arthralgias and back pain. Negative for joint swelling and neck stiffness.   Integumentary:  Negative for color change and rash.   Neurological: Negative for facial asymmetry and headaches.   Hematological: Negative for adenopathy. Does not bruise/bleed easily.   Psychiatric/Behavioral: Negative for agitation. The patient is not nervous/anxious.          Objective:      Physical Exam  Vitals signs and nursing note reviewed.   Constitutional:       General: He is not in acute distress.     Appearance: He is well-developed.   HENT:      Head: Normocephalic and atraumatic.      Right Ear: Tympanic membrane, ear canal and external ear normal.      Left Ear: Tympanic membrane, ear canal and external ear normal.      Nose: Nose normal.      Mouth/Throat:      Pharynx: Uvula midline.   Eyes:      Conjunctiva/sclera: Conjunctivae normal.      Pupils: Pupils are equal, round, and reactive to light.    Neck:      Musculoskeletal: Normal range of motion.      Thyroid: No thyromegaly.      Trachea: No tracheal deviation.   Cardiovascular:      Rate and Rhythm: Normal rate and regular rhythm.   Pulmonary:      Effort: Pulmonary effort is normal. No respiratory distress.   Musculoskeletal: Normal range of motion.   Lymphadenopathy:      Head:      Right side of head: No submental, submandibular or tonsillar adenopathy.      Left side of head: No submental, submandibular or tonsillar adenopathy.      Cervical: No cervical adenopathy.   Neurological:      Mental Status: He is alert and oriented to person, place, and time.   Psychiatric:         Behavior: Behavior normal.       Data:      Audiogram tracings independently reviewed and discussed with patient.  There is a high frequency SNHL with overall normal pure tone average, and speech discrimination is 100%.  Tympanometry is type A in both ears.   Assessment:       1. Bilateral high frequency sensorineural hearing loss    2. Tinnitus of both ears        Plan:         Discussed with patient multiple tinnitus management strategies including the use of maskers, instruments, background sound enrichment and other means. All questions answered and appropriate references given.    Recheck hearing in 1 year   Answers for HPI/ROS submitted by the patient on 6/15/2020   Tonsil infections?: Yes  Snoring?: Yes  Irregular heartbeat?: Yes

## 2020-06-16 NOTE — PROGRESS NOTES
6/16/2020    AUDIOLOGICAL EVALUATION:    Arpan Gamboa was seen for an audiological evaluation on 6/16/2020 for tinnitus bilaterally for the past 10 days.  Mr. Gamboa denied any history of noise exposure.  He has been using his headset for the past few months.    Pure tone threshold testing revealed a mild high frequency hearing loss bilaterally.  Speech reception thresholds were obtained at 10 dBHL for the right ear and 10 dBHL for the left ear.  Speech discrimination scores were obtained at 100% for the right ear and 100% for the left ear.    Tympanometry was within normal limits bilaterally indicating normal middle ear function.    Recommend:  1.  Otologic evaluation.  2.  Hearing protection in noise.  3.  Annual evaluation.            
improve balance in all areas 1/2 grade in 3-5 days

## 2020-06-16 NOTE — LETTER
June 16, 2020      Angeles Johnson MD  [address]  [address]  [facility] [address]           [facility]  [address]  [facility] [address]  Phone: [phone]  Fax: [phone]          Patient: Arpan Gamboa   MR Number: [id number]   YOB: 1965   Date of Visit: 6/16/2020       Dear Dr. Angeles Johnson:    Thank you for referring Arpan Gamboa to me for evaluation. Attached you will find relevant portions of my assessment and plan of care.    If you have questions, please do not hesitate to call me. I look forward to following Arpan Gamboa along with you.    Sincerely,    Peterson Mcfarland MD    Enclosure  CC:  No Recipients    If you would like to receive this communication electronically, please contact [email or web address] or [phone] to request more information on [facility] access.    For providers and/or their staff who would like to refer a patient to [facility], please contact us through our one-stop-shop provider referral line, [facility], at [phone].    If you feel you have received this communication in error or would no longer like to receive these types of communications, please e-mail [email or web address]

## 2020-06-16 NOTE — PROGRESS NOTES
Answers for HPI/ROS submitted by the patient on 6/15/2020   hearing loss: Yes  tinnitus: Yes  ear pain: Yes  Tonsil infections?: Yes  Snoring?: Yes  Irregular heartbeat?: Yes  frequency: Yes  Joint pain? : Yes  back pain: Yes

## 2020-06-17 ENCOUNTER — TELEPHONE (OUTPATIENT)
Dept: INTERNAL MEDICINE | Facility: CLINIC | Age: 55
End: 2020-06-17

## 2020-06-17 NOTE — TELEPHONE ENCOUNTER
Labs scheduled.    Patient's wife reported she would like Dr. Johnson to order a glucose tolerance test for her  as she is well aware that diabetes can go undetected with a hemoglobin A1c. Advised patient we typically don't order these tests, and she may want him to see an endocrinologist to have a more detailed workup ordered. Sent message to Dr. Johnson.

## 2020-06-17 NOTE — TELEPHONE ENCOUNTER
----- Message from Julianna Berger sent at 6/17/2020  1:18 PM CDT -----  Regarding: Appointment Access  Who called:DORINA CABRERA [[id number]]    What is the request in detail: Patient's wife is requesting a call back.She states she and her  discussed him being tested for diabetes. She would like to know if orders need to be placed or if he needs to come in for that appointment.    Please advise.    Can the clinic reply by [facility]? No    Best call back number:[phone]    Additional Information: N/A

## 2020-06-29 NOTE — TELEPHONE ENCOUNTER
"Called pt and LM to call back to schedule labs    Received message from Dr. Johnson: "  MD Shantel Montesinos, LPN   Caller: Unspecified (1 week ago)             I think she's thinking of gestational diabetes during pregnancy.  Diabetes is diagnosed by Hemoglobin A1C or fasting blood glucose, both of which have already been ordered.  Please schedule his labs.  Thanks!    "  "

## 2020-07-07 ENCOUNTER — TELEPHONE (OUTPATIENT)
Dept: ADMINISTRATIVE | Facility: OTHER | Age: 55
End: 2020-07-07

## 2020-07-07 NOTE — TELEPHONE ENCOUNTER
LM for patient to contact our scheduling office to discuss rescheduling appointment of 6-18-20 with .

## 2020-09-09 ENCOUNTER — NURSE TRIAGE (OUTPATIENT)
Dept: ADMINISTRATIVE | Facility: CLINIC | Age: 55
End: 2020-09-09

## 2020-09-09 ENCOUNTER — PATIENT MESSAGE (OUTPATIENT)
Dept: INTERNAL MEDICINE | Facility: CLINIC | Age: 55
End: 2020-09-09

## 2020-09-09 DIAGNOSIS — R68.83 CHILLS: ICD-10-CM

## 2020-09-09 DIAGNOSIS — R05.9 COUGH: ICD-10-CM

## 2020-09-09 DIAGNOSIS — U07.1 COVID-19: Primary | ICD-10-CM

## 2020-09-10 DIAGNOSIS — J45.20 MILD INTERMITTENT ASTHMA WITHOUT COMPLICATION: Primary | ICD-10-CM

## 2020-09-10 RX ORDER — ALBUTEROL SULFATE 90 UG/1
2 AEROSOL, METERED RESPIRATORY (INHALATION) DAILY PRN
Qty: 18 G | Refills: 5 | Status: SHIPPED | OUTPATIENT
Start: 2020-09-10 | End: 2022-07-11 | Stop reason: SDUPTHER

## 2020-09-10 NOTE — TELEPHONE ENCOUNTER
lvm regarding covid test order has placed   Replied to Pt  Offered covid test for tomorrow at 10am and virtual appt for noon   Awaiting response

## 2020-09-11 ENCOUNTER — LAB VISIT (OUTPATIENT)
Dept: INTERNAL MEDICINE | Facility: CLINIC | Age: 55
End: 2020-09-11
Payer: COMMERCIAL

## 2020-09-11 DIAGNOSIS — U07.1 COVID-19: ICD-10-CM

## 2020-09-11 DIAGNOSIS — R68.83 CHILLS: ICD-10-CM

## 2020-09-11 DIAGNOSIS — R05.9 COUGH: ICD-10-CM

## 2020-09-11 PROCEDURE — U0003 INFECTIOUS AGENT DETECTION BY NUCLEIC ACID (DNA OR RNA); SEVERE ACUTE RESPIRATORY SYNDROME CORONAVIRUS 2 (SARS-COV-2) (CORONAVIRUS DISEASE [COVID-19]), AMPLIFIED PROBE TECHNIQUE, MAKING USE OF HIGH THROUGHPUT TECHNOLOGIES AS DESCRIBED BY CMS-2020-01-R: HCPCS

## 2020-09-12 LAB — SARS-COV-2 RNA RESP QL NAA+PROBE: NOT DETECTED

## 2020-09-16 ENCOUNTER — TELEPHONE (OUTPATIENT)
Dept: INTERNAL MEDICINE | Facility: CLINIC | Age: 55
End: 2020-09-16

## 2020-09-16 ENCOUNTER — PATIENT MESSAGE (OUTPATIENT)
Dept: INTERNAL MEDICINE | Facility: CLINIC | Age: 55
End: 2020-09-16

## 2020-09-16 ENCOUNTER — OFFICE VISIT (OUTPATIENT)
Dept: INTERNAL MEDICINE | Facility: CLINIC | Age: 55
End: 2020-09-16
Attending: INTERNAL MEDICINE
Payer: COMMERCIAL

## 2020-09-16 DIAGNOSIS — J30.1 ALLERGIC RHINITIS DUE TO POLLEN, UNSPECIFIED SEASONALITY: ICD-10-CM

## 2020-09-16 DIAGNOSIS — J45.21 MILD INTERMITTENT ASTHMA WITH ACUTE EXACERBATION: Primary | ICD-10-CM

## 2020-09-16 PROCEDURE — 99214 PR OFFICE/OUTPT VISIT, EST, LEVL IV, 30-39 MIN: ICD-10-PCS | Mod: 95,,, | Performed by: INTERNAL MEDICINE

## 2020-09-16 PROCEDURE — 99214 OFFICE O/P EST MOD 30 MIN: CPT | Mod: 95,,, | Performed by: INTERNAL MEDICINE

## 2020-09-16 RX ORDER — BECLOMETHASONE DIPROPIONATE HFA 80 UG/1
AEROSOL, METERED RESPIRATORY (INHALATION)
COMMUNITY
Start: 2020-09-15 | End: 2021-05-28

## 2020-09-16 RX ORDER — FLUTICASONE PROPIONATE 50 MCG
1 SPRAY, SUSPENSION (ML) NASAL DAILY
COMMUNITY

## 2020-09-16 RX ORDER — AZITHROMYCIN 250 MG/1
TABLET, FILM COATED ORAL
COMMUNITY
Start: 2020-09-15 | End: 2020-11-24

## 2020-09-16 RX ORDER — AZITHROMYCIN 250 MG/1
TABLET, FILM COATED ORAL
COMMUNITY
Start: 2020-09-14 | End: 2020-09-16 | Stop reason: SDUPTHER

## 2020-09-16 RX ORDER — CETIRIZINE HYDROCHLORIDE 10 MG/1
10 TABLET ORAL DAILY
COMMUNITY

## 2020-09-16 NOTE — TELEPHONE ENCOUNTER
----- Message from Blanquita Serrano sent at 9/16/2020  2:42 PM CDT -----  Regarding: Covid Test  Contact: Vanna/Wife  Pt's wife is requesting a second COVID test be ordered and scheduled for tomorrow morning at [facility].    Please contact her at [phone].    Thanks

## 2020-09-16 NOTE — TELEPHONE ENCOUNTER
Spoke to wife Jaclyn,   Reviewed MD advise from portal message  Jaclyn states the patient has a history of asthma  And has had shortness of breath for 1 week  Refuses to bring him to the ER due to covid exposure and asthma status  Requesting a 2nd covid test and was recently tested on 09/11  Was told per [facility] Policy our providers are not allowed to order a second covid test when recently covid test was negative    Vanna requested to speak directly with MD  Confirmed I will forward a message    Offered a virtual appt  Scheduled same day virtual with Dr. Augustin at 5:20 pm to further discuss concern of asthma and shortness of breath and reasons for wanting a 2nd covid test

## 2020-09-16 NOTE — TELEPHONE ENCOUNTER
resp conditions   Extensive message   Sob for a week     Rather not go to er due to asthma   And has been hospitalized for asthma in the past   Want a second test  Started antibiotic yesterday

## 2020-09-16 NOTE — TELEPHONE ENCOUNTER
----- Message from Blanquita Serrano sent at 9/16/2020  2:42 PM CDT -----  Regarding: Covid Test  Contact: Vanna/Wife  Pt's wife is requesting a second COVID test be ordered and scheduled for tomorrow morning at [facility].    Please contact her at [phone].    Thanks

## 2020-09-16 NOTE — PROGRESS NOTES
Subjective:       Patient ID: Arpan Gamboa is a 55 y.o. male.    Chief Complaint: No chief complaint on file.    The patient location is: [place]   The chief complaint leading to consultation is:   Visit type: audiovisual  Total time spent with patient:  20 Minutes  visit performed on virtual visit due to current risk associated with COVID 19 pandemic  Each patient to whom he or she provides medical services by telemedicine is:  (1) informed of the relationship between the physician and patient and the respective role of any other health care provider with respect to management of the patient; and (2) notified that he or she may decline to receive medical services by telemedicine and may withdraw from such care at any time.    56 yo M with PMHx of childhood asthma that required several hospitalizations, allergic rhinitis, WPW s/p ablation    9 days prior developed sore throat. The following day developed chest tightness with exertion when running up steps at home. Since then has chest tightness and SOB with exertion and talkni glong periods of time. Improves with albuterol and daily antihistamine. Had COVID test 9/11/20 that was negative. Shortness of breath worsened so he had a telehealth visit with allergist via telehealth and Rx QVAR 80mcg inhaler BID, Azithromycin, sinus rinse.  He has a pulse oxygen at home and has been anywhere from 88-97%.  The 88 was not sustained.     Shortness of Breath  This is a new problem. The current episode started in the past 7 days. The problem occurs constantly. The problem has been gradually worsening. The average episode lasts 1 weeks. Associated symptoms include orthopnea and wheezing. Pertinent negatives include no abdominal pain, chest pain, claudication, coryza, ear pain, fever, headaches, hemoptysis, leg pain, leg swelling, neck pain, PND, rash, rhinorrhea, sore throat, sputum production, swollen glands, syncope or vomiting. The symptoms are aggravated by odors  and URIs. The patient has no known risk factors for DVT/PE. The treatment provided moderate relief. His past medical history is significant for allergies, asthma, bronchiolitis and CAD.       Review of Systems   Constitutional: Negative for fever.   HENT: Negative for ear pain, rhinorrhea and sore throat.    Respiratory: Positive for shortness of breath and wheezing. Negative for hemoptysis and sputum production.    Cardiovascular: Positive for orthopnea. Negative for chest pain, claudication, leg swelling, syncope and PND.   Gastrointestinal: Negative for abdominal pain and vomiting.   Musculoskeletal: Negative for neck pain.   Skin: Negative for rash.   Neurological: Negative for headaches.       Objective:      There were no vitals filed for this visit.   Physical Exam  Constitutional:       General: He is not in acute distress.     Appearance: Normal appearance. He is well-developed. He is not diaphoretic.   HENT:      Head: Atraumatic.   Eyes:      General: No scleral icterus.        Right eye: No discharge.         Left eye: No discharge.      Conjunctiva/sclera: Conjunctivae normal.   Neck:      Thyroid: No thyromegaly.   Pulmonary:      Effort: Respiratory distress (Could note minor distress and wheeze with talking long periods of time.  Also exhibited occasional dry cough that he reports originates from his throat when talking.) present.      Breath sounds: No wheezing.   Skin:     Coloration: Skin is not pale.   Neurological:      Mental Status: He is alert and oriented to person, place, and time.   Psychiatric:         Speech: Speech normal.         Assessment:       1. Mild intermittent asthma with acute exacerbation    2. Allergic rhinitis due to pollen, unspecified seasonality        Plan:       Diagnoses and all orders for this visit:    Mild intermittent asthma with acute exacerbation   Explained to pt the various etiologies of asthma, ACS, new onset HF, SVT, etc and need for PE and eval in clinic.  His current status and oxyegn levels should allow for a visit tomorrow at noon. Office and Emergency Department prompts discussed.    Allergic rhinitis due to pollen, unspecified seasonality   1)Antihistamines(Allegra, Claritin, Xzyal, Zyrtec)  2)Nasal Steroids (Nasocort, Rhinocort, Flonase)  3)Distilled salt water sinus rinses via neti pots or products such as Mike Med Sinus Rinse or Sinugator. Must wash container or device and use bottled water to avoid introducing infection.   You can can use (as directed) any combination of these three things every day of your life if needed in order to treat or control your symptoms. Brand name use of medications is not necessary             Ok Estrada MD  Internal Medicine-[facility]        Side effects of medication(s) were discussed in detail and patient voiced understanding.  Patient will call back for any issues or complications.

## 2020-09-16 NOTE — TELEPHONE ENCOUNTER
Spoke to wife on another encounter and replied on mychart  Updated med list   - QVAR RediHaler (beclomethasone dipropionate HFA) 80 mcg (two puffs twice a day)  - Albuterol Sulfate HFA 90 mcg (as needed)  - Azithromycin 250 mg (2 the first day, then 1 daily for 10 days)  - Flonase Sensimist (daily)  - Sinus rinses (daily)  - Zyrtec (daily)    Unable to add this on allergy list  - allergy barriers for mattress & pillows

## 2020-09-17 ENCOUNTER — OFFICE VISIT (OUTPATIENT)
Dept: INTERNAL MEDICINE | Facility: CLINIC | Age: 55
End: 2020-09-17
Attending: INTERNAL MEDICINE
Payer: COMMERCIAL

## 2020-09-17 ENCOUNTER — HOSPITAL ENCOUNTER (OUTPATIENT)
Dept: RADIOLOGY | Facility: OTHER | Age: 55
Discharge: HOME | End: 2020-09-17
Attending: INTERNAL MEDICINE
Payer: COMMERCIAL

## 2020-09-17 VITALS
BODY MASS INDEX: 24.04 KG/M2 | OXYGEN SATURATION: 98 % | HEART RATE: 99 BPM | WEIGHT: 171.75 LBS | HEIGHT: 71 IN | DIASTOLIC BLOOD PRESSURE: 56 MMHG | SYSTOLIC BLOOD PRESSURE: 110 MMHG

## 2020-09-17 DIAGNOSIS — J45.21 MILD INTERMITTENT ASTHMA WITH ACUTE EXACERBATION: Primary | ICD-10-CM

## 2020-09-17 DIAGNOSIS — J45.21 MILD INTERMITTENT ASTHMA WITH ACUTE EXACERBATION: ICD-10-CM

## 2020-09-17 DIAGNOSIS — I45.6 WOLFF-PARKINSON-WHITE SYNDROME: ICD-10-CM

## 2020-09-17 PROCEDURE — 93000 EKG 12-LEAD: ICD-10-PCS | Mod: S$GLB,,, | Performed by: INTERNAL MEDICINE

## 2020-09-17 PROCEDURE — 71046 XR CHEST PA AND LATERAL: ICD-10-PCS | Mod: 26,,, | Performed by: RADIOLOGY

## 2020-09-17 PROCEDURE — 99999 PR PBB SHADOW E&M-EST. PATIENT-LVL IV: CPT | Mod: PBBFAC,,, | Performed by: INTERNAL MEDICINE

## 2020-09-17 PROCEDURE — 3008F BODY MASS INDEX DOCD: CPT | Mod: CPTII,S$GLB,, | Performed by: INTERNAL MEDICINE

## 2020-09-17 PROCEDURE — 3008F PR BODY MASS INDEX (BMI) DOCUMENTED: ICD-10-PCS | Mod: CPTII,S$GLB,, | Performed by: INTERNAL MEDICINE

## 2020-09-17 PROCEDURE — 94640 AIRWAY INHALATION TREATMENT: CPT | Mod: S$GLB,,, | Performed by: INTERNAL MEDICINE

## 2020-09-17 PROCEDURE — 99214 PR OFFICE/OUTPT VISIT, EST, LEVL IV, 30-39 MIN: ICD-10-PCS | Mod: 25,S$GLB,, | Performed by: INTERNAL MEDICINE

## 2020-09-17 PROCEDURE — 94640 PR INHAL RX, AIRWAY OBST/DX SPUTUM INDUCT: ICD-10-PCS | Mod: S$GLB,,, | Performed by: INTERNAL MEDICINE

## 2020-09-17 PROCEDURE — 71046 X-RAY EXAM CHEST 2 VIEWS: CPT | Mod: TC,FY

## 2020-09-17 PROCEDURE — 99214 OFFICE O/P EST MOD 30 MIN: CPT | Mod: 25,S$GLB,, | Performed by: INTERNAL MEDICINE

## 2020-09-17 PROCEDURE — 93000 ELECTROCARDIOGRAM COMPLETE: CPT | Mod: S$GLB,,, | Performed by: INTERNAL MEDICINE

## 2020-09-17 PROCEDURE — 71046 X-RAY EXAM CHEST 2 VIEWS: CPT | Mod: 26,,, | Performed by: RADIOLOGY

## 2020-09-17 PROCEDURE — 99999 PR PBB SHADOW E&M-EST. PATIENT-LVL IV: ICD-10-PCS | Mod: PBBFAC,,, | Performed by: INTERNAL MEDICINE

## 2020-09-17 RX ORDER — IPRATROPIUM BROMIDE AND ALBUTEROL SULFATE 2.5; .5 MG/3ML; MG/3ML
3 SOLUTION RESPIRATORY (INHALATION)
Status: COMPLETED | OUTPATIENT
Start: 2020-09-17 | End: 2020-09-17

## 2020-09-17 RX ADMIN — IPRATROPIUM BROMIDE AND ALBUTEROL SULFATE 3 ML: 2.5; .5 SOLUTION RESPIRATORY (INHALATION) at 01:09

## 2020-09-17 NOTE — PROGRESS NOTES
Subjective:       Patient ID: Arpan Gamboa is a 55 y.o. male.    Chief Complaint: No chief complaint on file.    Here for urgent visit    56 yo M with PMHx of childhood asthma that required several hospitalizations, allergic rhinitis, WPW s/p ablation    Seen yesterday for virtual visit. HPI below. No change in symptoms in past 20 hours.     HPI from Yesterday:  9 days prior developed sore throat. The following day developed chest tightness with exertion when running up steps at home. Since then has chest tightness and SOB with exertion and talkni glong periods of time. Improves with albuterol and daily antihistamine. Had COVID test 9/11/20 that was negative. Shortness of breath worsened so he had a telehealth visit with allergist via telehealth and Rx QVAR 80mcg inhaler BID, Azithromycin, sinus rinse.  He has a pulse oxygen at home and has been anywhere from 88-97%.  The 88 was not sustained          Review of Systems   Constitutional: Negative for appetite change, chills, fever and unexpected weight change.   HENT: Negative for hearing loss, sore throat and trouble swallowing.    Eyes: Negative for visual disturbance.   Respiratory: Negative for cough, chest tightness and shortness of breath.    Cardiovascular: Negative for chest pain and leg swelling.   Gastrointestinal: Negative for abdominal pain, blood in stool, constipation, diarrhea, nausea and vomiting.   Endocrine: Negative for polydipsia and polyuria.   Genitourinary: Negative for decreased urine volume, difficulty urinating, dysuria, frequency and urgency.   Musculoskeletal: Negative for gait problem.   Skin: Negative for rash.   Neurological: Negative for dizziness and numbness.   Psychiatric/Behavioral: The patient is not nervous/anxious.        Objective:      Vitals:    09/17/20 1244 09/17/20 1245 09/17/20 1247 09/17/20 1248   BP:       Pulse: 84 79 101 99   SpO2: 97% 98% 97% 98%   Weight:       Height:          Physical Exam  Constitutional:        General: He is not in acute distress.     Appearance: He is well-developed.   HENT:      Head: Normocephalic and atraumatic.      Mouth/Throat:      Pharynx: No oropharyngeal exudate.   Eyes:      General: No scleral icterus.     Conjunctiva/sclera: Conjunctivae normal.      Pupils: Pupils are equal, round, and reactive to light.   Neck:      Thyroid: No thyromegaly.      Vascular: No JVD.   Cardiovascular:      Rate and Rhythm: Normal rate and regular rhythm.      Heart sounds: Normal heart sounds. No murmur.   Pulmonary:      Effort: Pulmonary effort is normal.      Breath sounds: Normal breath sounds. No wheezing or rales.   Abdominal:      General: There is no distension.      Palpations: Abdomen is soft.      Tenderness: There is no abdominal tenderness.   Musculoskeletal:         General: No tenderness.   Lymphadenopathy:      Cervical: No cervical adenopathy.   Skin:     General: Skin is warm and dry.   Neurological:      Mental Status: He is alert and oriented to person, place, and time.   Psychiatric:         Behavior: Behavior normal.         Assessment:       1. Mild intermittent asthma with acute exacerbation    2. Sebastián-Parkinson-White syndrome        Plan:       Diagnoses and all orders for this visit:    Mild intermittent asthma with acute exacerbation   No s/s of volume overload on exam. Pt able to ambulate approx 700ft in clinic and maintain oxygenation at 97-98% (c/ minimal HR response as well). EKG in clinic normal. His pain of his posterior upper back is reproducible. His breathing and tightness improved with nebilizer in clinic. He is already on Zpak Rx from ENT from virtual visit, but will get CXR anyway (pluse Hx not suggestive of infectious etiology). Office and Emergency Department prompts discussed.    -     albuterol-ipratropium 2.5 mg-0.5 mg/3 mL nebulizer solution 3 mL  -     X-Ray Chest PA And Lateral; Future    Sebastián-Parkinson-White syndrome   S/p ablation and no evidence on EKG in  clinic. HF concern reduced after exam.           Ok Estrada MD  Internal Medicine-[facility]        Side effects of medication(s) were discussed in detail and patient voiced understanding.  Patient will call back for any issues or complications.

## 2020-09-22 ENCOUNTER — TELEPHONE (OUTPATIENT)
Dept: INTERNAL MEDICINE | Facility: CLINIC | Age: 55
End: 2020-09-22

## 2020-09-22 NOTE — TELEPHONE ENCOUNTER
----- Message from Imtiaz Briggs sent at 9/22/2020  4:13 PM CDT -----  Hello     EKG was performed on 9/17/20 however there was no order placed. Could you place and release an EKG order at your earliest conveniance. Thank you

## 2020-10-05 ENCOUNTER — PATIENT MESSAGE (OUTPATIENT)
Dept: INTERNAL MEDICINE | Facility: CLINIC | Age: 55
End: 2020-10-05

## 2020-10-23 ENCOUNTER — IMMUNIZATION (OUTPATIENT)
Dept: INTERNAL MEDICINE | Facility: CLINIC | Age: 55
End: 2020-10-23
Payer: COMMERCIAL

## 2020-10-23 PROCEDURE — 90471 FLU VACCINE (QUAD) GREATER THAN OR EQUAL TO 3YO PRESERVATIVE FREE IM: ICD-10-PCS | Mod: S$GLB,,, | Performed by: INTERNAL MEDICINE

## 2020-10-23 PROCEDURE — 90686 FLU VACCINE (QUAD) GREATER THAN OR EQUAL TO 3YO PRESERVATIVE FREE IM: ICD-10-PCS | Mod: S$GLB,,, | Performed by: INTERNAL MEDICINE

## 2020-10-23 PROCEDURE — 90686 IIV4 VACC NO PRSV 0.5 ML IM: CPT | Mod: S$GLB,,, | Performed by: INTERNAL MEDICINE

## 2020-10-23 PROCEDURE — 90471 IMMUNIZATION ADMIN: CPT | Mod: S$GLB,,, | Performed by: INTERNAL MEDICINE

## 2020-11-10 ENCOUNTER — PATIENT MESSAGE (OUTPATIENT)
Dept: OTOLARYNGOLOGY | Facility: CLINIC | Age: 55
End: 2020-11-10

## 2020-11-10 DIAGNOSIS — Z03.818 ENCOUNTER FOR OBSERVATION FOR SUSPECTED EXPOSURE TO OTHER BIOLOGICAL AGENTS RULED OUT: ICD-10-CM

## 2020-11-23 ENCOUNTER — PATIENT OUTREACH (OUTPATIENT)
Dept: ADMINISTRATIVE | Facility: OTHER | Age: 55
End: 2020-11-23

## 2020-11-23 NOTE — PROGRESS NOTES
Requested updates within Care Everywhere.  Patient's chart was reviewed for overdue SCAR topics.  Immunizations reconciled.

## 2020-11-24 ENCOUNTER — OFFICE VISIT (OUTPATIENT)
Dept: CARDIOLOGY | Facility: CLINIC | Age: 55
End: 2020-11-24
Payer: COMMERCIAL

## 2020-11-24 VITALS
SYSTOLIC BLOOD PRESSURE: 113 MMHG | DIASTOLIC BLOOD PRESSURE: 69 MMHG | OXYGEN SATURATION: 98 % | HEART RATE: 85 BPM | HEIGHT: 71 IN | WEIGHT: 172.81 LBS | BODY MASS INDEX: 24.19 KG/M2

## 2020-11-24 DIAGNOSIS — R07.9 CHEST PAIN OF UNKNOWN ETIOLOGY: ICD-10-CM

## 2020-11-24 DIAGNOSIS — J45.909 UNCOMPLICATED ASTHMA, UNSPECIFIED ASTHMA SEVERITY, UNSPECIFIED WHETHER PERSISTENT: ICD-10-CM

## 2020-11-24 DIAGNOSIS — I45.6 WOLFF-PARKINSON-WHITE SYNDROME: ICD-10-CM

## 2020-11-24 DIAGNOSIS — R06.09 DYSPNEA ON EXERTION: Primary | ICD-10-CM

## 2020-11-24 DIAGNOSIS — Z98.890 STATUS POST ABLATION OF ACCESSORY BYPASS TRACT: ICD-10-CM

## 2020-11-24 PROCEDURE — 99999 PR PBB SHADOW E&M-EST. PATIENT-LVL IV: CPT | Mod: PBBFAC,,, | Performed by: INTERNAL MEDICINE

## 2020-11-24 PROCEDURE — 3008F PR BODY MASS INDEX (BMI) DOCUMENTED: ICD-10-PCS | Mod: CPTII,S$GLB,, | Performed by: INTERNAL MEDICINE

## 2020-11-24 PROCEDURE — 93000 ELECTROCARDIOGRAM COMPLETE: CPT | Mod: S$GLB,,, | Performed by: INTERNAL MEDICINE

## 2020-11-24 PROCEDURE — 93000 EKG 12-LEAD: ICD-10-PCS | Mod: S$GLB,,, | Performed by: INTERNAL MEDICINE

## 2020-11-24 PROCEDURE — 99214 OFFICE O/P EST MOD 30 MIN: CPT | Mod: S$GLB,,, | Performed by: INTERNAL MEDICINE

## 2020-11-24 PROCEDURE — 3008F BODY MASS INDEX DOCD: CPT | Mod: CPTII,S$GLB,, | Performed by: INTERNAL MEDICINE

## 2020-11-24 PROCEDURE — 99999 PR PBB SHADOW E&M-EST. PATIENT-LVL IV: ICD-10-PCS | Mod: PBBFAC,,, | Performed by: INTERNAL MEDICINE

## 2020-11-24 PROCEDURE — 99214 PR OFFICE/OUTPT VISIT, EST, LEVL IV, 30-39 MIN: ICD-10-PCS | Mod: S$GLB,,, | Performed by: INTERNAL MEDICINE

## 2020-11-24 PROCEDURE — 1126F PR PAIN SEVERITY QUANTIFIED, NO PAIN PRESENT: ICD-10-PCS | Mod: S$GLB,,, | Performed by: INTERNAL MEDICINE

## 2020-11-24 PROCEDURE — 1126F AMNT PAIN NOTED NONE PRSNT: CPT | Mod: S$GLB,,, | Performed by: INTERNAL MEDICINE

## 2020-11-24 NOTE — PROGRESS NOTES
Chart has been dictated using voice recognition software.  It is not been reviewed carefully for any transcriptional errors due to this technology.   Subjective:   Patient ID:  Arpan Gamboa is a 55 y.o. 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 on 06-Jan-2020.    Patient has been having exertional dyspnea starting about 3 months ago.  Has had chest tightness associated with dyspnea.  Symptoms lasted for 12+ hours.  Has had 2 recent bad episodes over the past 2 days.  Is continuously short of breath.  No orthopnea and PND.  Wife notices some swelling in his ankles. No palpitations since ablation.  No lightheadedness or syncope. Patient denies lower extremity claudication.      Cardiac risk factors: hyperlipidemia    Past Medical History:   Diagnosis Date    Allergy     Asthma     Sleep apnea     Tachycardia     WPW       Past Surgical History:   Procedure Laterality Date    CARDIAC ELECTROPHYSIOLOGY STUDY AND ABLATION  01/06/2020    [facility]    hernia repair at 3 yrs old         Social History     Tobacco Use    Smoking status: Passive Smoke Exposure - Never Smoker    Smokeless tobacco: Never Used   Substance Use Topics    Alcohol use: Yes     Alcohol/week: 0.0 standard drinks     Comment: socially    Drug use: Not on file       Outpatient Medications Prior to Visit   Medication Sig Dispense Refill    albuterol (VENTOLIN HFA) 90 mcg/actuation inhaler Inhale 2 puffs into the lungs daily as needed. 18 g 5    beclomethasone dipropionate (QVAR REDIHALER) 80 mcg/actuation HFAB       cetirizine (ZYRTEC) 10 MG tablet Take 10 mg by mouth once daily.      fluticasone propionate (FLONASE) 50 mcg/actuation nasal spray 1 spray by Each Nostril route  "once daily.      azithromycin (Z-GWENDOLYN) 250 MG tablet        No facility-administered medications prior to visit.        Review of patient's allergies indicates:   Allergen Reactions    Allergenic extracts Shortness Of Breath     - allergy barriers for mattress & pillows 09/16/2020    Cat hair standardized allergenic extract Shortness Of Breath     - allergy barriers for mattress & pillows 09/16/2020    Dog hair standardized allergenic extract      - allergy barriers for mattress & pillows 09/16/2020       Review of Systems   Constitution: Negative for weight gain and weight loss.   HENT: Positive for tinnitus. Negative for nosebleeds.    Hematologic/Lymphatic: Negative for bleeding problem. Does not bruise/bleed easily.   Musculoskeletal: Positive for joint pain (right knee).   Gastrointestinal: Negative for bowel incontinence, constipation, hematemesis, hematochezia, nausea and vomiting.   Genitourinary: Negative for hematuria.   Neurological: Negative for focal weakness and numbness.      Objective:   Physical Exam   Constitutional: He is oriented to person, place, and time. He appears well-developed and well-nourished.   /69 (BP Location: Left arm, Patient Position: Sitting, BP Method: Large (Automatic))   Pulse 85   Ht 5' 11" (1.803 m)   Wt 78.4 kg (172 lb 13.5 oz)   SpO2 98%   BMI 24.11 kg/m²      Neck: Neck supple. No JVD present. Carotid bruit is not present.   Cardiovascular: Normal rate, regular rhythm and intact distal pulses. Exam reveals no gallop and no friction rub.   No murmur heard.  Split S1, normal S2   Pulmonary/Chest: Effort normal and breath sounds normal. He has no wheezes. He has no rales.   Abdominal: Soft. Bowel sounds are normal. He exhibits no abdominal bruit. There is no hepatomegaly. There is no abdominal tenderness.   Musculoskeletal:         General: No edema.   Neurological: He is alert and oriented to person, place, and time. He has normal strength.   Skin: No cyanosis. " Nails show no clubbing.       Lab Results   Component Value Date    WBC 5.87 12/20/2019    HGB 15.2 12/20/2019    HCT 46.5 12/20/2019    MCV 95 12/20/2019     12/20/2019       Lab Results   Component Value Date     09/07/2019    K 3.8 09/07/2019    BUN 14 09/07/2019    CREATININE 1.0 09/07/2019    GLU 90 09/07/2019    CHOL 210 (H) 12/20/2019    HDL 58 12/20/2019    LDLCALC 133.6 12/20/2019    TRIG 92 12/20/2019    CHOLHDL 27.6 12/20/2019    HGB 15.2 12/20/2019    HCT 46.5 12/20/2019     12/20/2019     ECG showed sinus rhythm and was a normal ECG.     ASCVD+ 10 year cardiovascular risk =  3.8%  Assessment:     1. Dyspnea on exertion    2. Chest pain of unknown etiology    3. Status post ablation of accessory bypass tract    4. Sebastián-Parkinson-White syndrome    5. Uncomplicated asthma, unspecified asthma severity, unspecified whether persistent      Patient presents with dyspnea on exertion associated with chest tightness.  The symptoms have lasted for hours and almost today.  His electrocardiogram is normal with normal conduction.  His physical exam shows no abnormalities.  Is ASCVD +10 year cardiovascular risk score is low The likelihood the patient has coronary disease is relatively small but given the lack of response for asthma treatment, the patient will be sent for a stress echocardiogram to determine whether there is any evidence of ischemia or any abnormalities in left ventricular size and function.  Further decisions will be made upon reviewing the results of the stress echocardiogram.  However, unless the stress echocardiogram shows any significant abnormalities, , there is no need for the patient to return for re-evaluation.  However, I would be happy to see the patient again if needed.   Plan:     Arpan was seen today for shortness of breath.    Diagnoses and all orders for this visit:    Dyspnea on exertion  -     EKG 12-lead; Future; Expected date: 11/24/2020  -     EKG 12-lead  -      Stress Echo Which stress agent will be used? Exercise; Future; Expected date: 11/24/2020    Chest pain of unknown etiology  -     Stress Echo Which stress agent will be used? Exercise; Future; Expected date: 11/24/2020    Status post ablation of accessory bypass tract    Sebastián-Parkinson-White syndrome    Uncomplicated asthma, unspecified asthma severity, unspecified whether persistent          Robbin Aleman MD  Consultative Cardiology

## 2020-11-24 NOTE — Clinical Note
Thank you for referring Arpan Gamboa for evaluation of chest pain of unknown etiology and dyspnea on exertion. Please see my note for details of this encounter. If you have any questions, please contact me.  Thank you again for the referral.

## 2020-11-27 ENCOUNTER — HOSPITAL ENCOUNTER (OUTPATIENT)
Dept: CARDIOLOGY | Facility: HOSPITAL | Age: 55
Discharge: HOME | End: 2020-11-27
Attending: INTERNAL MEDICINE
Payer: COMMERCIAL

## 2020-11-27 VITALS — BODY MASS INDEX: 23.8 KG/M2 | WEIGHT: 170 LBS | HEIGHT: 71 IN

## 2020-11-27 DIAGNOSIS — R06.09 DYSPNEA ON EXERTION: ICD-10-CM

## 2020-11-27 DIAGNOSIS — R07.9 CHEST PAIN OF UNKNOWN ETIOLOGY: ICD-10-CM

## 2020-11-27 LAB
ASCENDING AORTA: 3.03 CM
AV INDEX (PROSTH): 1.04
AV MEAN GRADIENT: 4 MMHG
AV PEAK GRADIENT: 9 MMHG
AV VALVE AREA: 3.62 CM2
AV VELOCITY RATIO: 1.03
BSA FOR ECHO PROCEDURE: 1.97 M2
CV ECHO LV RWT: 0.34 CM
CV STRESS BASE HR: 75 BPM
DIASTOLIC BLOOD PRESSURE: 61 MMHG
DOP CALC AO PEAK VEL: 1.46 M/S
DOP CALC AO VTI: 25.32 CM
DOP CALC LVOT AREA: 3.5 CM2
DOP CALC LVOT DIAMETER: 2.1 CM
DOP CALC LVOT PEAK VEL: 1.5 M/S
DOP CALC LVOT STROKE VOLUME: 91.57 CM3
DOP CALCLVOT PEAK VEL VTI: 26.45 CM
E WAVE DECELERATION TIME: 236.93 MSEC
E/A RATIO: 1.05
E/E' RATIO: 6.32 M/S
ECHO LV POSTERIOR WALL: 0.8 CM (ref 0.6–1.1)
FRACTIONAL SHORTENING: 30 % (ref 28–44)
INTERVENTRICULAR SEPTUM: 0.8 CM (ref 0.6–1.1)
LA MAJOR: 4.8 CM
LA MINOR: 5 CM
LA WIDTH: 3.7 CM
LEFT ATRIUM SIZE: 3.35 CM
LEFT ATRIUM VOLUME INDEX: 26.2 ML/M2
LEFT ATRIUM VOLUME: 51.6 CM3
LEFT INTERNAL DIMENSION IN SYSTOLE: 3.31 CM (ref 2.1–4)
LEFT VENTRICLE DIASTOLIC VOLUME INDEX: 52.07 ML/M2
LEFT VENTRICLE DIASTOLIC VOLUME: 102.49 ML
LEFT VENTRICLE MASS INDEX: 62 G/M2
LEFT VENTRICLE SYSTOLIC VOLUME INDEX: 22.5 ML/M2
LEFT VENTRICLE SYSTOLIC VOLUME: 44.37 ML
LEFT VENTRICULAR INTERNAL DIMENSION IN DIASTOLE: 4.7 CM (ref 3.5–6)
LEFT VENTRICULAR MASS: 122.26 G
LV LATERAL E/E' RATIO: 5.64 M/S
LV SEPTAL E/E' RATIO: 7.18 M/S
MV PEAK A VEL: 0.75 M/S
MV PEAK E VEL: 0.79 M/S
OHS CV CPX 1 MINUTE RECOVERY HEART RATE: 137 BPM
OHS CV CPX 85 PERCENT MAX PREDICTED HEART RATE MALE: 141
OHS CV CPX ESTIMATED METS: 17
OHS CV CPX MAX PREDICTED HEART RATE: 166
OHS CV CPX PATIENT IS FEMALE: 0
OHS CV CPX PATIENT IS MALE: 1
OHS CV CPX PEAK DIASTOLIC BLOOD PRESSURE: 82 MMHG
OHS CV CPX PEAK HEAR RATE: 176 BPM
OHS CV CPX PEAK RATE PRESSURE PRODUCT: NORMAL
OHS CV CPX PEAK SYSTOLIC BLOOD PRESSURE: 199 MMHG
OHS CV CPX PERCENT MAX PREDICTED HEART RATE ACHIEVED: 106
OHS CV CPX RATE PRESSURE PRODUCT PRESENTING: 9150
PISA TR MAX VEL: 1.93 M/S
PULM VEIN S/D RATIO: 1.18
PV PEAK D VEL: 0.62 M/S
PV PEAK S VEL: 0.73 M/S
RA MAJOR: 4.6 CM
RA PRESSURE: 3 MMHG
RA WIDTH: 3.5 CM
RIGHT VENTRICULAR END-DIASTOLIC DIMENSION: 3.58 CM
RV TISSUE DOPPLER FREE WALL SYSTOLIC VELOCITY 1 (APICAL 4 CHAMBER VIEW): 17.42 CM/S
SINUS: 3.13 CM
STJ: 2.65 CM
STRESS ECHO POST EXERCISE DUR MIN: 9 MINUTES
STRESS ECHO POST EXERCISE DUR SEC: 47 SECONDS
STRESS ST DEPRESSION: 0.5 MM
SYSTOLIC BLOOD PRESSURE: 122 MMHG
TDI LATERAL: 0.14 M/S
TDI SEPTAL: 0.11 M/S
TDI: 0.13 M/S
TR MAX PG: 15 MMHG
TRICUSPID ANNULAR PLANE SYSTOLIC EXCURSION: 2.72 CM
TV REST PULMONARY ARTERY PRESSURE: 18 MMHG

## 2020-11-27 PROCEDURE — 93351 STRESS ECHO (CUPID ONLY): ICD-10-PCS | Mod: 26,,, | Performed by: INTERNAL MEDICINE

## 2020-11-27 PROCEDURE — 93320 DOPPLER ECHO COMPLETE: CPT | Mod: 26,,, | Performed by: INTERNAL MEDICINE

## 2020-11-27 PROCEDURE — 93351 STRESS TTE COMPLETE: CPT

## 2020-11-27 PROCEDURE — 93351 STRESS TTE COMPLETE: CPT | Mod: 26,,, | Performed by: INTERNAL MEDICINE

## 2020-11-27 PROCEDURE — 93325 STRESS ECHO (CUPID ONLY): ICD-10-PCS | Mod: 26,,, | Performed by: INTERNAL MEDICINE

## 2020-11-27 PROCEDURE — 93325 DOPPLER ECHO COLOR FLOW MAPG: CPT | Mod: 26,,, | Performed by: INTERNAL MEDICINE

## 2020-11-27 PROCEDURE — 93320 STRESS ECHO (CUPID ONLY): ICD-10-PCS | Mod: 26,,, | Performed by: INTERNAL MEDICINE

## 2020-11-30 ENCOUNTER — PATIENT MESSAGE (OUTPATIENT)
Dept: CARDIOLOGY | Facility: CLINIC | Age: 55
End: 2020-11-30

## 2020-12-17 ENCOUNTER — TELEPHONE (OUTPATIENT)
Dept: INTERNAL MEDICINE | Facility: CLINIC | Age: 55
End: 2020-12-17

## 2020-12-17 NOTE — TELEPHONE ENCOUNTER
----- Message from Camila Loyola sent at 12/17/2020  3:58 PM CST -----  Regarding: Callback  Name of Who is Calling: DORINA CABRERA [[id number]] What is the request in detail: Pt is requesting a callback concerning getting an appt pt advised that they would like to speak to a nurse concerning care   Can the clinic reply by [facility]: NO  [facility]: [phone] // [phone]

## 2020-12-22 ENCOUNTER — TELEPHONE (OUTPATIENT)
Dept: PULMONOLOGY | Facility: CLINIC | Age: 55
End: 2020-12-22

## 2020-12-22 ENCOUNTER — OFFICE VISIT (OUTPATIENT)
Dept: INTERNAL MEDICINE | Facility: CLINIC | Age: 55
End: 2020-12-22
Attending: FAMILY MEDICINE
Payer: COMMERCIAL

## 2020-12-22 DIAGNOSIS — R06.02 SHORTNESS OF BREATH: ICD-10-CM

## 2020-12-22 DIAGNOSIS — K21.9 LARYNGOPHARYNGEAL REFLUX (LPR): Primary | ICD-10-CM

## 2020-12-22 DIAGNOSIS — R06.02 SOB (SHORTNESS OF BREATH): Primary | ICD-10-CM

## 2020-12-22 PROCEDURE — 99214 OFFICE O/P EST MOD 30 MIN: CPT | Mod: 95,,, | Performed by: FAMILY MEDICINE

## 2020-12-22 PROCEDURE — 99214 PR OFFICE/OUTPT VISIT, EST, LEVL IV, 30-39 MIN: ICD-10-PCS | Mod: 95,,, | Performed by: FAMILY MEDICINE

## 2020-12-22 NOTE — PROGRESS NOTES
CHIEF COMPLAINT:  Dyspnea    HISTORY OF PRESENT ILLNESS: The patient is a generally healthy 55 year-old male.  The patient has Dyspnea worse w/ exertion for 3 months.  3 weeks of cough as well.  Ventolin and QVAR not helping.  He Sees outside pulmonary via V Visit from [place].  C/O LPR as well.  3 weeks on PPI with no improvement.    REVIEW OF SYSTEMS:  GENERAL: No fever, chills, fatigability or weight loss.  SKIN: No rashes, itching or changes in color or texture of skin.  HEAD: No headaches or recent head trauma.  EYES: Visual acuity fine. No photophobia, ocular pain or diplopia.  EARS: Denies ear pain, discharge or vertigo.  NOSE: No loss of smell, no epistaxis or postnasal drip.  MOUTH & THROAT: No hoarseness or change in voice. No excessive gum bleeding.  NODES: Denies swollen glands.  CHEST: Denies cyanosis, wheezing and sputum production.  CARDIOVASCULAR: Denies chest pain, PND, orthopnea.  ABDOMEN: Appetite fine. No weight loss. Denies diarrhea, abdominal pain, hematemesis or blood in stool.  URINARY: No flank pain, dysuria or hematuria.  PERIPHERAL VASCULAR: No claudication or cyanosis.  MUSCULOSKELETAL: No joint stiffness or swelling. Denies back pain.  NEUROLOGIC: No history of seizures, paralysis, alteration of gait or coordination.    SOCIAL HISTORY: The patient does not smoke.  The patient consumes alcohol socially.  The patient is .    PHYSICAL EXAMINATION:   APPEARANCE: Well nourished, well developed, in no acute distress.    HEAD: Normocephalic, atraumatic.  EYES: PERRL. EOMI.  Conjunctivae without injection and  anicteric  NEUROLOGIC:       Normal speech development.      Hearing normal.  PSYCHIATRIC: Patient is alert and oriented x3.  Thought processes are all normal.  There is no homicidality.  There is no suicidality.  There is no evidence of psychosis.    LABORATORY/RADIOLOGY:   Chart reviewed.  We will update blood work today.    ASSESSMENT:   Dyspnea worse w/ exertion for 3 months.  3  weeks of cough as well.   C/O LPR as well.     PLAN:  Pulmonary and ENT visits in person  Follow up PCP

## 2021-01-04 ENCOUNTER — PATIENT MESSAGE (OUTPATIENT)
Dept: ADMINISTRATIVE | Facility: HOSPITAL | Age: 56
End: 2021-01-04

## 2021-01-07 ENCOUNTER — TELEPHONE (OUTPATIENT)
Dept: INTERNAL MEDICINE | Facility: CLINIC | Age: 56
End: 2021-01-07

## 2021-01-14 ENCOUNTER — TELEPHONE (OUTPATIENT)
Dept: OTOLARYNGOLOGY | Facility: CLINIC | Age: 56
End: 2021-01-14

## 2021-02-03 ENCOUNTER — PATIENT OUTREACH (OUTPATIENT)
Dept: ADMINISTRATIVE | Facility: OTHER | Age: 56
End: 2021-02-03

## 2021-02-05 ENCOUNTER — PATIENT MESSAGE (OUTPATIENT)
Dept: INTERNAL MEDICINE | Facility: CLINIC | Age: 56
End: 2021-02-05

## 2021-02-05 DIAGNOSIS — L21.9 SEBORRHEIC DERMATITIS: ICD-10-CM

## 2021-02-07 RX ORDER — KETOCONAZOLE 20 MG/ML
SHAMPOO, SUSPENSION TOPICAL
Qty: 240 ML | Refills: 3 | Status: SHIPPED | OUTPATIENT
Start: 2021-02-07 | End: 2021-07-06

## 2021-03-29 ENCOUNTER — TELEPHONE (OUTPATIENT)
Dept: DERMATOLOGY | Facility: CLINIC | Age: 56
End: 2021-03-29

## 2021-04-19 ENCOUNTER — PATIENT MESSAGE (OUTPATIENT)
Dept: DERMATOLOGY | Facility: CLINIC | Age: 56
End: 2021-04-19

## 2021-04-19 ENCOUNTER — PATIENT OUTREACH (OUTPATIENT)
Dept: ADMINISTRATIVE | Facility: OTHER | Age: 56
End: 2021-04-19

## 2021-04-20 ENCOUNTER — OFFICE VISIT (OUTPATIENT)
Dept: DERMATOLOGY | Facility: CLINIC | Age: 56
End: 2021-04-20
Payer: COMMERCIAL

## 2021-04-20 DIAGNOSIS — D48.5 NEOPLASM OF UNCERTAIN BEHAVIOR OF SKIN: Primary | ICD-10-CM

## 2021-04-20 PROCEDURE — 11401 PR EXC SKIN BENIG 0.6-1 CM TRUNK,ARM,LEG: ICD-10-PCS | Mod: S$GLB,,, | Performed by: DERMATOLOGY

## 2021-04-20 PROCEDURE — 99499 UNLISTED E&M SERVICE: CPT | Mod: S$GLB,,, | Performed by: DERMATOLOGY

## 2021-04-20 PROCEDURE — 88305 TISSUE EXAM BY PATHOLOGIST: CPT | Performed by: PATHOLOGY

## 2021-04-20 PROCEDURE — 11401 EXC TR-EXT B9+MARG 0.6-1 CM: CPT | Mod: S$GLB,,, | Performed by: DERMATOLOGY

## 2021-04-20 PROCEDURE — 88305 TISSUE EXAM BY PATHOLOGIST: CPT | Mod: 26,,, | Performed by: PATHOLOGY

## 2021-04-20 PROCEDURE — 1126F PR PAIN SEVERITY QUANTIFIED, NO PAIN PRESENT: ICD-10-PCS | Mod: S$GLB,,, | Performed by: DERMATOLOGY

## 2021-04-20 PROCEDURE — 99499 NO LOS: ICD-10-PCS | Mod: S$GLB,,, | Performed by: DERMATOLOGY

## 2021-04-20 PROCEDURE — 1126F AMNT PAIN NOTED NONE PRSNT: CPT | Mod: S$GLB,,, | Performed by: DERMATOLOGY

## 2021-04-20 PROCEDURE — 88305 TISSUE EXAM BY PATHOLOGIST: ICD-10-PCS | Mod: 26,,, | Performed by: PATHOLOGY

## 2021-04-20 RX ORDER — ESOMEPRAZOLE MAGNESIUM 40 MG/1
40 CAPSULE, DELAYED RELEASE ORAL DAILY
COMMUNITY
Start: 2021-03-12

## 2021-04-20 RX ORDER — ACETAMINOPHEN 500 MG
TABLET ORAL
COMMUNITY

## 2021-04-28 LAB
FINAL PATHOLOGIC DIAGNOSIS: NORMAL
GROSS: NORMAL
Lab: NORMAL
MICROSCOPIC EXAM: NORMAL

## 2021-05-02 ENCOUNTER — PATIENT MESSAGE (OUTPATIENT)
Dept: ADMINISTRATIVE | Facility: HOSPITAL | Age: 56
End: 2021-05-02

## 2021-05-03 ENCOUNTER — PATIENT MESSAGE (OUTPATIENT)
Dept: DERMATOLOGY | Facility: CLINIC | Age: 56
End: 2021-05-03

## 2021-05-04 ENCOUNTER — CLINICAL SUPPORT (OUTPATIENT)
Dept: DERMATOLOGY | Facility: CLINIC | Age: 56
End: 2021-05-04
Payer: COMMERCIAL

## 2021-05-04 DIAGNOSIS — Z48.02 VISIT FOR SUTURE REMOVAL: Primary | ICD-10-CM

## 2021-05-04 PROCEDURE — 99024 PR POST-OP FOLLOW-UP VISIT: ICD-10-PCS | Mod: S$GLB,,, | Performed by: DERMATOLOGY

## 2021-05-04 PROCEDURE — 99024 POSTOP FOLLOW-UP VISIT: CPT | Mod: S$GLB,,, | Performed by: DERMATOLOGY

## 2021-05-05 ENCOUNTER — TELEPHONE (OUTPATIENT)
Dept: SLEEP MEDICINE | Facility: CLINIC | Age: 56
End: 2021-05-05

## 2021-05-11 ENCOUNTER — OFFICE VISIT (OUTPATIENT)
Dept: CARDIOLOGY | Facility: CLINIC | Age: 56
End: 2021-05-11
Attending: INTERNAL MEDICINE
Payer: COMMERCIAL

## 2021-05-11 ENCOUNTER — OFFICE VISIT (OUTPATIENT)
Dept: SLEEP MEDICINE | Facility: CLINIC | Age: 56
End: 2021-05-11
Payer: COMMERCIAL

## 2021-05-11 VITALS
DIASTOLIC BLOOD PRESSURE: 68 MMHG | SYSTOLIC BLOOD PRESSURE: 116 MMHG | BODY MASS INDEX: 24.47 KG/M2 | WEIGHT: 174.81 LBS | HEART RATE: 69 BPM | HEIGHT: 71 IN

## 2021-05-11 VITALS
HEART RATE: 66 BPM | HEIGHT: 71 IN | BODY MASS INDEX: 23.34 KG/M2 | DIASTOLIC BLOOD PRESSURE: 65 MMHG | SYSTOLIC BLOOD PRESSURE: 106 MMHG | WEIGHT: 166.69 LBS

## 2021-05-11 DIAGNOSIS — J45.30 MILD PERSISTENT ASTHMA WITHOUT COMPLICATION: ICD-10-CM

## 2021-05-11 DIAGNOSIS — G47.33 OBSTRUCTIVE SLEEP APNEA: Primary | ICD-10-CM

## 2021-05-11 DIAGNOSIS — R06.02 SHORTNESS OF BREATH: ICD-10-CM

## 2021-05-11 DIAGNOSIS — I45.6 WOLFF-PARKINSON-WHITE SYNDROME: ICD-10-CM

## 2021-05-11 DIAGNOSIS — G47.33 OBSTRUCTIVE SLEEP APNEA ON CPAP: ICD-10-CM

## 2021-05-11 PROBLEM — I47.10 PSVT (PAROXYSMAL SUPRAVENTRICULAR TACHYCARDIA): Status: RESOLVED | Noted: 2018-05-04 | Resolved: 2021-05-11

## 2021-05-11 PROCEDURE — 99999 PR PBB SHADOW E&M-EST. PATIENT-LVL III: CPT | Mod: PBBFAC,,, | Performed by: INTERNAL MEDICINE

## 2021-05-11 PROCEDURE — 99999 PR PBB SHADOW E&M-EST. PATIENT-LVL III: ICD-10-PCS | Mod: PBBFAC,,, | Performed by: INTERNAL MEDICINE

## 2021-05-11 PROCEDURE — 99999 PR PBB SHADOW E&M-EST. PATIENT-LVL III: CPT | Mod: PBBFAC,,, | Performed by: NURSE PRACTITIONER

## 2021-05-11 PROCEDURE — 3008F BODY MASS INDEX DOCD: CPT | Mod: CPTII,S$GLB,, | Performed by: INTERNAL MEDICINE

## 2021-05-11 PROCEDURE — 3008F BODY MASS INDEX DOCD: CPT | Mod: CPTII,S$GLB,, | Performed by: NURSE PRACTITIONER

## 2021-05-11 PROCEDURE — 99214 OFFICE O/P EST MOD 30 MIN: CPT | Mod: S$GLB,,, | Performed by: INTERNAL MEDICINE

## 2021-05-11 PROCEDURE — 99999 PR PBB SHADOW E&M-EST. PATIENT-LVL III: ICD-10-PCS | Mod: PBBFAC,,, | Performed by: NURSE PRACTITIONER

## 2021-05-11 PROCEDURE — 3008F PR BODY MASS INDEX (BMI) DOCUMENTED: ICD-10-PCS | Mod: CPTII,S$GLB,, | Performed by: NURSE PRACTITIONER

## 2021-05-11 PROCEDURE — 99204 OFFICE O/P NEW MOD 45 MIN: CPT | Mod: S$GLB,,, | Performed by: NURSE PRACTITIONER

## 2021-05-11 PROCEDURE — 99204 PR OFFICE/OUTPT VISIT, NEW, LEVL IV, 45-59 MIN: ICD-10-PCS | Mod: S$GLB,,, | Performed by: NURSE PRACTITIONER

## 2021-05-11 PROCEDURE — 99214 PR OFFICE/OUTPT VISIT, EST, LEVL IV, 30-39 MIN: ICD-10-PCS | Mod: S$GLB,,, | Performed by: INTERNAL MEDICINE

## 2021-05-11 PROCEDURE — 3008F PR BODY MASS INDEX (BMI) DOCUMENTED: ICD-10-PCS | Mod: CPTII,S$GLB,, | Performed by: INTERNAL MEDICINE

## 2021-05-17 ENCOUNTER — TELEPHONE (OUTPATIENT)
Dept: GASTROENTEROLOGY | Facility: CLINIC | Age: 56
End: 2021-05-17

## 2021-05-17 DIAGNOSIS — Z12.11 COLON CANCER SCREENING: Primary | ICD-10-CM

## 2021-05-28 ENCOUNTER — OFFICE VISIT (OUTPATIENT)
Dept: INTERNAL MEDICINE | Facility: CLINIC | Age: 56
End: 2021-05-28
Payer: COMMERCIAL

## 2021-05-28 VITALS
DIASTOLIC BLOOD PRESSURE: 73 MMHG | HEIGHT: 71 IN | SYSTOLIC BLOOD PRESSURE: 111 MMHG | WEIGHT: 167.75 LBS | HEART RATE: 75 BPM | BODY MASS INDEX: 23.48 KG/M2 | OXYGEN SATURATION: 96 %

## 2021-05-28 DIAGNOSIS — R35.0 BENIGN PROSTATIC HYPERPLASIA WITH URINARY FREQUENCY: ICD-10-CM

## 2021-05-28 DIAGNOSIS — J45.30 MILD PERSISTENT ASTHMA WITHOUT COMPLICATION: ICD-10-CM

## 2021-05-28 DIAGNOSIS — Z11.4 SCREENING FOR HIV (HUMAN IMMUNODEFICIENCY VIRUS): ICD-10-CM

## 2021-05-28 DIAGNOSIS — Z13.1 ENCOUNTER FOR SCREENING FOR DIABETES MELLITUS: ICD-10-CM

## 2021-05-28 DIAGNOSIS — Z00.00 ANNUAL PHYSICAL EXAM: Primary | ICD-10-CM

## 2021-05-28 DIAGNOSIS — E55.9 VITAMIN D DEFICIENCY: ICD-10-CM

## 2021-05-28 DIAGNOSIS — Z23 NEED FOR ZOSTER VACCINE: ICD-10-CM

## 2021-05-28 DIAGNOSIS — E78.00 ELEVATED CHOLESTEROL: ICD-10-CM

## 2021-05-28 DIAGNOSIS — Z23 NEED FOR PNEUMOCOCCAL VACCINE: ICD-10-CM

## 2021-05-28 DIAGNOSIS — N40.1 BENIGN PROSTATIC HYPERPLASIA WITH URINARY FREQUENCY: ICD-10-CM

## 2021-05-28 DIAGNOSIS — Z12.5 PROSTATE CANCER SCREENING: ICD-10-CM

## 2021-05-28 DIAGNOSIS — K21.9 GASTROESOPHAGEAL REFLUX DISEASE, UNSPECIFIED WHETHER ESOPHAGITIS PRESENT: ICD-10-CM

## 2021-05-28 DIAGNOSIS — H93.19 TINNITUS, UNSPECIFIED LATERALITY: ICD-10-CM

## 2021-05-28 DIAGNOSIS — R25.2 CRAMPS OF LOWER EXTREMITY: ICD-10-CM

## 2021-05-28 DIAGNOSIS — Z11.59 NEED FOR HEPATITIS C SCREENING TEST: ICD-10-CM

## 2021-05-28 PROCEDURE — 99999 PR PBB SHADOW E&M-EST. PATIENT-LVL III: CPT | Mod: PBBFAC,,, | Performed by: INTERNAL MEDICINE

## 2021-05-28 PROCEDURE — 1126F PR PAIN SEVERITY QUANTIFIED, NO PAIN PRESENT: ICD-10-PCS | Mod: S$GLB,,, | Performed by: INTERNAL MEDICINE

## 2021-05-28 PROCEDURE — 1126F AMNT PAIN NOTED NONE PRSNT: CPT | Mod: S$GLB,,, | Performed by: INTERNAL MEDICINE

## 2021-05-28 PROCEDURE — 3008F BODY MASS INDEX DOCD: CPT | Mod: CPTII,S$GLB,, | Performed by: INTERNAL MEDICINE

## 2021-05-28 PROCEDURE — 99999 PR PBB SHADOW E&M-EST. PATIENT-LVL III: ICD-10-PCS | Mod: PBBFAC,,, | Performed by: INTERNAL MEDICINE

## 2021-05-28 PROCEDURE — 99396 PREV VISIT EST AGE 40-64: CPT | Mod: S$GLB,,, | Performed by: INTERNAL MEDICINE

## 2021-05-28 PROCEDURE — 3008F PR BODY MASS INDEX (BMI) DOCUMENTED: ICD-10-PCS | Mod: CPTII,S$GLB,, | Performed by: INTERNAL MEDICINE

## 2021-05-28 PROCEDURE — 99396 PR PREVENTIVE VISIT,EST,40-64: ICD-10-PCS | Mod: S$GLB,,, | Performed by: INTERNAL MEDICINE

## 2021-05-28 RX ORDER — MAGNESIUM CARB/ALUMINUM HYDROX 105-160MG
TABLET,CHEWABLE ORAL
COMMUNITY
End: 2021-05-28

## 2021-06-03 NOTE — TELEPHONE ENCOUNTER
Carolyn Genao  [address] [address]      2021      : 1970    Dear Ms. Genao:    We have been trying to reach you without success.  Please call my office at [phone].    Thank you for your timely response.    Sincerely,         Nida Moore MD         Pt wife advised of testing options for covid testing.

## 2021-06-07 ENCOUNTER — LAB VISIT (OUTPATIENT)
Dept: LAB | Facility: OTHER | Age: 56
End: 2021-06-07
Attending: INTERNAL MEDICINE
Payer: COMMERCIAL

## 2021-06-07 ENCOUNTER — PATIENT MESSAGE (OUTPATIENT)
Dept: GASTROENTEROLOGY | Facility: CLINIC | Age: 56
End: 2021-06-07

## 2021-06-07 DIAGNOSIS — Z11.4 SCREENING FOR HIV (HUMAN IMMUNODEFICIENCY VIRUS): ICD-10-CM

## 2021-06-07 DIAGNOSIS — E78.00 ELEVATED CHOLESTEROL: ICD-10-CM

## 2021-06-07 DIAGNOSIS — N40.1 BENIGN PROSTATIC HYPERPLASIA WITH URINARY FREQUENCY: ICD-10-CM

## 2021-06-07 DIAGNOSIS — R35.0 BENIGN PROSTATIC HYPERPLASIA WITH URINARY FREQUENCY: ICD-10-CM

## 2021-06-07 DIAGNOSIS — R25.2 CRAMPS OF LOWER EXTREMITY: ICD-10-CM

## 2021-06-07 DIAGNOSIS — Z12.5 PROSTATE CANCER SCREENING: ICD-10-CM

## 2021-06-07 DIAGNOSIS — Z00.00 ANNUAL PHYSICAL EXAM: ICD-10-CM

## 2021-06-07 DIAGNOSIS — Z13.1 ENCOUNTER FOR SCREENING FOR DIABETES MELLITUS: ICD-10-CM

## 2021-06-07 DIAGNOSIS — Z11.59 NEED FOR HEPATITIS C SCREENING TEST: ICD-10-CM

## 2021-06-07 DIAGNOSIS — E55.9 VITAMIN D DEFICIENCY: ICD-10-CM

## 2021-06-07 LAB
ALBUMIN SERPL BCP-MCNC: 3.7 G/DL (ref 3.5–5.2)
ALP SERPL-CCNC: 100 U/L (ref 55–135)
ALT SERPL W/O P-5'-P-CCNC: 30 U/L (ref 10–44)
ANION GAP SERPL CALC-SCNC: 8 MMOL/L (ref 8–16)
AST SERPL-CCNC: 25 U/L (ref 10–40)
BASOPHILS # BLD AUTO: 0.04 K/UL (ref 0–0.2)
BASOPHILS NFR BLD: 0.8 % (ref 0–1.9)
BILIRUB SERPL-MCNC: 0.2 MG/DL (ref 0.1–1)
BUN SERPL-MCNC: 18 MG/DL (ref 6–20)
CALCIUM SERPL-MCNC: 8.6 MG/DL (ref 8.7–10.5)
CHLORIDE SERPL-SCNC: 109 MMOL/L (ref 95–110)
CHOLEST SERPL-MCNC: 170 MG/DL (ref 120–199)
CHOLEST/HDLC SERPL: 3.5 {RATIO} (ref 2–5)
CO2 SERPL-SCNC: 24 MMOL/L (ref 23–29)
CREAT SERPL-MCNC: 0.8 MG/DL (ref 0.5–1.4)
DIFFERENTIAL METHOD: ABNORMAL
EOSINOPHIL # BLD AUTO: 0.2 K/UL (ref 0–0.5)
EOSINOPHIL NFR BLD: 4.5 % (ref 0–8)
ERYTHROCYTE [DISTWIDTH] IN BLOOD BY AUTOMATED COUNT: 11.8 % (ref 11.5–14.5)
EST. GFR  (AFRICAN AMERICAN): >60 ML/MIN/1.73 M^2
EST. GFR  (NON AFRICAN AMERICAN): >60 ML/MIN/1.73 M^2
ESTIMATED AVG GLUCOSE: 97 MG/DL (ref 68–131)
GLUCOSE SERPL-MCNC: 98 MG/DL (ref 70–110)
HBA1C MFR BLD: 5 % (ref 4–5.6)
HCT VFR BLD AUTO: 42.6 % (ref 40–54)
HCV AB SERPL QL IA: NEGATIVE
HDLC SERPL-MCNC: 49 MG/DL (ref 40–75)
HDLC SERPL: 28.8 % (ref 20–50)
HGB BLD-MCNC: 14.1 G/DL (ref 14–18)
HIV 1+2 AB+HIV1 P24 AG SERPL QL IA: NEGATIVE
IMM GRANULOCYTES # BLD AUTO: 0.01 K/UL (ref 0–0.04)
IMM GRANULOCYTES NFR BLD AUTO: 0.2 % (ref 0–0.5)
LDLC SERPL CALC-MCNC: 102.8 MG/DL (ref 63–159)
LYMPHOCYTES # BLD AUTO: 1.5 K/UL (ref 1–4.8)
LYMPHOCYTES NFR BLD: 30.8 % (ref 18–48)
MAGNESIUM SERPL-MCNC: 1.9 MG/DL (ref 1.6–2.6)
MCH RBC QN AUTO: 30.9 PG (ref 27–31)
MCHC RBC AUTO-ENTMCNC: 33.1 G/DL (ref 32–36)
MCV RBC AUTO: 93 FL (ref 82–98)
MONOCYTES # BLD AUTO: 0.4 K/UL (ref 0.3–1)
MONOCYTES NFR BLD: 8.5 % (ref 4–15)
NEUTROPHILS # BLD AUTO: 2.7 K/UL (ref 1.8–7.7)
NEUTROPHILS NFR BLD: 55.2 % (ref 38–73)
NONHDLC SERPL-MCNC: 121 MG/DL
NRBC BLD-RTO: 0 /100 WBC
PLATELET # BLD AUTO: 146 K/UL (ref 150–450)
PMV BLD AUTO: 11.3 FL (ref 9.2–12.9)
POTASSIUM SERPL-SCNC: 4.2 MMOL/L (ref 3.5–5.1)
PROT SERPL-MCNC: 6.9 G/DL (ref 6–8.4)
RBC # BLD AUTO: 4.57 M/UL (ref 4.6–6.2)
SODIUM SERPL-SCNC: 141 MMOL/L (ref 136–145)
TRIGL SERPL-MCNC: 91 MG/DL (ref 30–150)
WBC # BLD AUTO: 4.84 K/UL (ref 3.9–12.7)

## 2021-06-07 PROCEDURE — 84153 ASSAY OF PSA TOTAL: CPT | Performed by: INTERNAL MEDICINE

## 2021-06-07 PROCEDURE — 86803 HEPATITIS C AB TEST: CPT | Performed by: INTERNAL MEDICINE

## 2021-06-07 PROCEDURE — 82306 VITAMIN D 25 HYDROXY: CPT | Performed by: INTERNAL MEDICINE

## 2021-06-07 PROCEDURE — 80061 LIPID PANEL: CPT | Performed by: INTERNAL MEDICINE

## 2021-06-07 PROCEDURE — 83735 ASSAY OF MAGNESIUM: CPT | Performed by: INTERNAL MEDICINE

## 2021-06-07 PROCEDURE — 80053 COMPREHEN METABOLIC PANEL: CPT | Performed by: INTERNAL MEDICINE

## 2021-06-07 PROCEDURE — 36415 COLL VENOUS BLD VENIPUNCTURE: CPT | Performed by: INTERNAL MEDICINE

## 2021-06-07 PROCEDURE — 86703 HIV-1/HIV-2 1 RESULT ANTBDY: CPT | Performed by: INTERNAL MEDICINE

## 2021-06-07 PROCEDURE — 85025 COMPLETE CBC W/AUTO DIFF WBC: CPT | Performed by: INTERNAL MEDICINE

## 2021-06-07 PROCEDURE — 83036 HEMOGLOBIN GLYCOSYLATED A1C: CPT | Performed by: INTERNAL MEDICINE

## 2021-06-08 LAB
25(OH)D3+25(OH)D2 SERPL-MCNC: 45 NG/ML (ref 30–96)
COMPLEXED PSA SERPL-MCNC: 0.37 NG/ML (ref 0–4)

## 2021-06-09 ENCOUNTER — TELEPHONE (OUTPATIENT)
Dept: GASTROENTEROLOGY | Facility: CLINIC | Age: 56
End: 2021-06-09

## 2021-06-16 ENCOUNTER — PATIENT MESSAGE (OUTPATIENT)
Dept: SLEEP MEDICINE | Facility: CLINIC | Age: 56
End: 2021-06-16

## 2021-06-17 ENCOUNTER — PATIENT MESSAGE (OUTPATIENT)
Dept: ENDOSCOPY | Facility: HOSPITAL | Age: 56
End: 2021-06-17

## 2021-06-17 DIAGNOSIS — Z12.11 SPECIAL SCREENING FOR MALIGNANT NEOPLASMS, COLON: Primary | ICD-10-CM

## 2021-06-17 RX ORDER — SODIUM, POTASSIUM,MAG SULFATES 17.5-3.13G
1 SOLUTION, RECONSTITUTED, ORAL ORAL DAILY
Qty: 1 KIT | Refills: 0 | Status: SHIPPED | OUTPATIENT
Start: 2021-06-17 | End: 2021-06-19

## 2021-06-21 ENCOUNTER — PATIENT MESSAGE (OUTPATIENT)
Dept: INTERNAL MEDICINE | Facility: CLINIC | Age: 56
End: 2021-06-21

## 2021-07-15 ENCOUNTER — OFFICE VISIT (OUTPATIENT)
Dept: OTOLARYNGOLOGY | Facility: CLINIC | Age: 56
End: 2021-07-15
Payer: COMMERCIAL

## 2021-07-15 VITALS
DIASTOLIC BLOOD PRESSURE: 68 MMHG | BODY MASS INDEX: 24.44 KG/M2 | TEMPERATURE: 98 F | SYSTOLIC BLOOD PRESSURE: 129 MMHG | HEART RATE: 81 BPM | WEIGHT: 175.19 LBS

## 2021-07-15 DIAGNOSIS — J35.8 TONSIL STONE: ICD-10-CM

## 2021-07-15 DIAGNOSIS — K13.79 MUCOCELE OF MOUTH: Primary | ICD-10-CM

## 2021-07-15 PROCEDURE — 3008F BODY MASS INDEX DOCD: CPT | Mod: CPTII,S$GLB,, | Performed by: OTOLARYNGOLOGY

## 2021-07-15 PROCEDURE — 3008F PR BODY MASS INDEX (BMI) DOCUMENTED: ICD-10-PCS | Mod: CPTII,S$GLB,, | Performed by: OTOLARYNGOLOGY

## 2021-07-15 PROCEDURE — 10160 PNXR ASPIR ABSC HMTMA BULLA: CPT | Mod: S$GLB,,, | Performed by: OTOLARYNGOLOGY

## 2021-07-15 PROCEDURE — 10160 PR PUNCTURE DRAINAGE, LESION: ICD-10-PCS | Mod: S$GLB,,, | Performed by: OTOLARYNGOLOGY

## 2021-07-15 PROCEDURE — 99213 OFFICE O/P EST LOW 20 MIN: CPT | Mod: 25,S$GLB,, | Performed by: OTOLARYNGOLOGY

## 2021-07-15 PROCEDURE — 99213 PR OFFICE/OUTPT VISIT, EST, LEVL III, 20-29 MIN: ICD-10-PCS | Mod: 25,S$GLB,, | Performed by: OTOLARYNGOLOGY

## 2021-07-15 PROCEDURE — 1126F PR PAIN SEVERITY QUANTIFIED, NO PAIN PRESENT: ICD-10-PCS | Mod: S$GLB,,, | Performed by: OTOLARYNGOLOGY

## 2021-07-15 PROCEDURE — 1126F AMNT PAIN NOTED NONE PRSNT: CPT | Mod: S$GLB,,, | Performed by: OTOLARYNGOLOGY

## 2021-07-21 ENCOUNTER — OFFICE VISIT (OUTPATIENT)
Dept: SLEEP MEDICINE | Facility: CLINIC | Age: 56
End: 2021-07-21
Payer: COMMERCIAL

## 2021-07-21 ENCOUNTER — PATIENT OUTREACH (OUTPATIENT)
Dept: ADMINISTRATIVE | Facility: OTHER | Age: 56
End: 2021-07-21

## 2021-07-21 DIAGNOSIS — G47.33 OBSTRUCTIVE SLEEP APNEA: Primary | ICD-10-CM

## 2021-07-21 PROCEDURE — 99213 PR OFFICE/OUTPT VISIT, EST, LEVL III, 20-29 MIN: ICD-10-PCS | Mod: CR,95,, | Performed by: NURSE PRACTITIONER

## 2021-07-21 PROCEDURE — 99213 OFFICE O/P EST LOW 20 MIN: CPT | Mod: CR,95,, | Performed by: NURSE PRACTITIONER

## 2021-07-23 ENCOUNTER — HOSPITAL ENCOUNTER (OUTPATIENT)
Dept: RADIOLOGY | Facility: HOSPITAL | Age: 56
Discharge: HOME | End: 2021-07-23
Attending: ORTHOPAEDIC SURGERY
Payer: COMMERCIAL

## 2021-07-23 ENCOUNTER — OFFICE VISIT (OUTPATIENT)
Dept: SPORTS MEDICINE | Facility: CLINIC | Age: 56
End: 2021-07-23
Payer: COMMERCIAL

## 2021-07-23 VITALS
WEIGHT: 172.63 LBS | DIASTOLIC BLOOD PRESSURE: 63 MMHG | SYSTOLIC BLOOD PRESSURE: 105 MMHG | HEART RATE: 82 BPM | HEIGHT: 71 IN | BODY MASS INDEX: 24.17 KG/M2

## 2021-07-23 DIAGNOSIS — G89.29 CHRONIC PAIN OF RIGHT KNEE: Primary | ICD-10-CM

## 2021-07-23 DIAGNOSIS — M25.561 RIGHT KNEE PAIN, UNSPECIFIED CHRONICITY: ICD-10-CM

## 2021-07-23 DIAGNOSIS — M25.561 CHRONIC PAIN OF RIGHT KNEE: Primary | ICD-10-CM

## 2021-07-23 PROCEDURE — 99999 PR PBB SHADOW E&M-EST. PATIENT-LVL III: ICD-10-PCS | Mod: PBBFAC,,, | Performed by: ORTHOPAEDIC SURGERY

## 2021-07-23 PROCEDURE — 3008F PR BODY MASS INDEX (BMI) DOCUMENTED: ICD-10-PCS | Mod: CPTII,S$GLB,, | Performed by: ORTHOPAEDIC SURGERY

## 2021-07-23 PROCEDURE — 1125F PR PAIN SEVERITY QUANTIFIED, PAIN PRESENT: ICD-10-PCS | Mod: CPTII,S$GLB,, | Performed by: ORTHOPAEDIC SURGERY

## 2021-07-23 PROCEDURE — 73564 X-RAY EXAM KNEE 4 OR MORE: CPT | Mod: TC,50

## 2021-07-23 PROCEDURE — 1125F AMNT PAIN NOTED PAIN PRSNT: CPT | Mod: CPTII,S$GLB,, | Performed by: ORTHOPAEDIC SURGERY

## 2021-07-23 PROCEDURE — 73564 XR KNEE ORTHO BILAT WITH FLEXION: ICD-10-PCS | Mod: 26,50,, | Performed by: RADIOLOGY

## 2021-07-23 PROCEDURE — 73564 X-RAY EXAM KNEE 4 OR MORE: CPT | Mod: 26,50,, | Performed by: RADIOLOGY

## 2021-07-23 PROCEDURE — 99214 OFFICE O/P EST MOD 30 MIN: CPT | Mod: S$GLB,,, | Performed by: ORTHOPAEDIC SURGERY

## 2021-07-23 PROCEDURE — 3008F BODY MASS INDEX DOCD: CPT | Mod: CPTII,S$GLB,, | Performed by: ORTHOPAEDIC SURGERY

## 2021-07-23 PROCEDURE — 99999 PR PBB SHADOW E&M-EST. PATIENT-LVL III: CPT | Mod: PBBFAC,,, | Performed by: ORTHOPAEDIC SURGERY

## 2021-07-23 PROCEDURE — 99214 PR OFFICE/OUTPT VISIT, EST, LEVL IV, 30-39 MIN: ICD-10-PCS | Mod: S$GLB,,, | Performed by: ORTHOPAEDIC SURGERY

## 2021-08-17 ENCOUNTER — TELEPHONE (OUTPATIENT)
Dept: SPORTS MEDICINE | Facility: CLINIC | Age: 56
End: 2021-08-17

## 2021-09-10 ENCOUNTER — TELEPHONE (OUTPATIENT)
Dept: GASTROENTEROLOGY | Facility: CLINIC | Age: 56
End: 2021-09-10

## 2021-09-20 ENCOUNTER — TELEPHONE (OUTPATIENT)
Dept: GASTROENTEROLOGY | Facility: CLINIC | Age: 56
End: 2021-09-20

## 2021-09-26 ENCOUNTER — PATIENT OUTREACH (OUTPATIENT)
Dept: ADMINISTRATIVE | Facility: OTHER | Age: 56
End: 2021-09-26

## 2021-09-27 ENCOUNTER — TELEPHONE (OUTPATIENT)
Dept: GASTROENTEROLOGY | Facility: CLINIC | Age: 56
End: 2021-09-27

## 2021-09-27 ENCOUNTER — PATIENT MESSAGE (OUTPATIENT)
Dept: GASTROENTEROLOGY | Facility: CLINIC | Age: 56
End: 2021-09-27

## 2021-10-11 DIAGNOSIS — Z12.11 COLON CANCER SCREENING: Primary | ICD-10-CM

## 2021-10-11 DIAGNOSIS — Z01.818 PRE-OP TESTING: Primary | ICD-10-CM

## 2021-10-11 RX ORDER — SODIUM, POTASSIUM,MAG SULFATES 17.5-3.13G
1 SOLUTION, RECONSTITUTED, ORAL ORAL DAILY
Qty: 1 KIT | Refills: 0 | Status: SHIPPED | OUTPATIENT
Start: 2021-10-11 | End: 2021-10-13

## 2021-10-12 ENCOUNTER — OFFICE VISIT (OUTPATIENT)
Dept: INTERNAL MEDICINE | Facility: CLINIC | Age: 56
End: 2021-10-12
Payer: COMMERCIAL

## 2021-10-12 VITALS
OXYGEN SATURATION: 97 % | HEART RATE: 77 BPM | BODY MASS INDEX: 24.88 KG/M2 | DIASTOLIC BLOOD PRESSURE: 60 MMHG | SYSTOLIC BLOOD PRESSURE: 120 MMHG | WEIGHT: 178.38 LBS

## 2021-10-12 DIAGNOSIS — M94.0 COSTOCHONDRITIS: Primary | ICD-10-CM

## 2021-10-12 DIAGNOSIS — M54.9 MID BACK PAIN ON RIGHT SIDE: ICD-10-CM

## 2021-10-12 PROCEDURE — 3078F DIAST BP <80 MM HG: CPT | Mod: CPTII,S$GLB,, | Performed by: INTERNAL MEDICINE

## 2021-10-12 PROCEDURE — 99999 PR PBB SHADOW E&M-EST. PATIENT-LVL III: ICD-10-PCS | Mod: PBBFAC,,, | Performed by: INTERNAL MEDICINE

## 2021-10-12 PROCEDURE — 3044F PR MOST RECENT HEMOGLOBIN A1C LEVEL <7.0%: ICD-10-PCS | Mod: CPTII,S$GLB,, | Performed by: INTERNAL MEDICINE

## 2021-10-12 PROCEDURE — 3044F HG A1C LEVEL LT 7.0%: CPT | Mod: CPTII,S$GLB,, | Performed by: INTERNAL MEDICINE

## 2021-10-12 PROCEDURE — 1159F MED LIST DOCD IN RCRD: CPT | Mod: CPTII,S$GLB,, | Performed by: INTERNAL MEDICINE

## 2021-10-12 PROCEDURE — 3008F BODY MASS INDEX DOCD: CPT | Mod: CPTII,S$GLB,, | Performed by: INTERNAL MEDICINE

## 2021-10-12 PROCEDURE — 99215 PR OFFICE/OUTPT VISIT, EST, LEVL V, 40-54 MIN: ICD-10-PCS | Mod: S$GLB,,, | Performed by: INTERNAL MEDICINE

## 2021-10-12 PROCEDURE — 1160F PR REVIEW ALL MEDS BY PRESCRIBER/CLIN PHARMACIST DOCUMENTED: ICD-10-PCS | Mod: CPTII,S$GLB,, | Performed by: INTERNAL MEDICINE

## 2021-10-12 PROCEDURE — 3078F PR MOST RECENT DIASTOLIC BLOOD PRESSURE < 80 MM HG: ICD-10-PCS | Mod: CPTII,S$GLB,, | Performed by: INTERNAL MEDICINE

## 2021-10-12 PROCEDURE — 1160F RVW MEDS BY RX/DR IN RCRD: CPT | Mod: CPTII,S$GLB,, | Performed by: INTERNAL MEDICINE

## 2021-10-12 PROCEDURE — 99215 OFFICE O/P EST HI 40 MIN: CPT | Mod: S$GLB,,, | Performed by: INTERNAL MEDICINE

## 2021-10-12 PROCEDURE — 3008F PR BODY MASS INDEX (BMI) DOCUMENTED: ICD-10-PCS | Mod: CPTII,S$GLB,, | Performed by: INTERNAL MEDICINE

## 2021-10-12 PROCEDURE — 3074F PR MOST RECENT SYSTOLIC BLOOD PRESSURE < 130 MM HG: ICD-10-PCS | Mod: CPTII,S$GLB,, | Performed by: INTERNAL MEDICINE

## 2021-10-12 PROCEDURE — 99999 PR PBB SHADOW E&M-EST. PATIENT-LVL III: CPT | Mod: PBBFAC,,, | Performed by: INTERNAL MEDICINE

## 2021-10-12 PROCEDURE — 1159F PR MEDICATION LIST DOCUMENTED IN MEDICAL RECORD: ICD-10-PCS | Mod: CPTII,S$GLB,, | Performed by: INTERNAL MEDICINE

## 2021-10-12 PROCEDURE — 3074F SYST BP LT 130 MM HG: CPT | Mod: CPTII,S$GLB,, | Performed by: INTERNAL MEDICINE

## 2021-10-12 RX ORDER — MELOXICAM 7.5 MG/1
7.5 TABLET ORAL DAILY
Qty: 7 TABLET | Refills: 0 | Status: SHIPPED | OUTPATIENT
Start: 2021-10-12 | End: 2021-10-19

## 2021-10-12 RX ORDER — CYCLOBENZAPRINE HCL 5 MG
5 TABLET ORAL 3 TIMES DAILY PRN
Qty: 30 TABLET | Refills: 1 | Status: SHIPPED | OUTPATIENT
Start: 2021-10-12 | End: 2022-07-15 | Stop reason: ALTCHOICE

## 2021-11-10 ENCOUNTER — OFFICE VISIT (OUTPATIENT)
Dept: INTERNAL MEDICINE | Facility: CLINIC | Age: 56
End: 2021-11-10
Payer: COMMERCIAL

## 2021-11-10 ENCOUNTER — PATIENT MESSAGE (OUTPATIENT)
Dept: INTERNAL MEDICINE | Facility: CLINIC | Age: 56
End: 2021-11-10
Payer: COMMERCIAL

## 2021-11-10 VITALS
TEMPERATURE: 98 F | DIASTOLIC BLOOD PRESSURE: 68 MMHG | BODY MASS INDEX: 24.81 KG/M2 | WEIGHT: 177.25 LBS | OXYGEN SATURATION: 98 % | HEIGHT: 71 IN | HEART RATE: 73 BPM | SYSTOLIC BLOOD PRESSURE: 122 MMHG

## 2021-11-10 DIAGNOSIS — G44.209 ACUTE NON INTRACTABLE TENSION-TYPE HEADACHE: Primary | ICD-10-CM

## 2021-11-10 PROCEDURE — 3078F DIAST BP <80 MM HG: CPT | Mod: CPTII,S$GLB,, | Performed by: INTERNAL MEDICINE

## 2021-11-10 PROCEDURE — 3074F PR MOST RECENT SYSTOLIC BLOOD PRESSURE < 130 MM HG: ICD-10-PCS | Mod: CPTII,S$GLB,, | Performed by: INTERNAL MEDICINE

## 2021-11-10 PROCEDURE — 3008F PR BODY MASS INDEX (BMI) DOCUMENTED: ICD-10-PCS | Mod: CPTII,S$GLB,, | Performed by: INTERNAL MEDICINE

## 2021-11-10 PROCEDURE — 3074F SYST BP LT 130 MM HG: CPT | Mod: CPTII,S$GLB,, | Performed by: INTERNAL MEDICINE

## 2021-11-10 PROCEDURE — 1159F MED LIST DOCD IN RCRD: CPT | Mod: CPTII,S$GLB,, | Performed by: INTERNAL MEDICINE

## 2021-11-10 PROCEDURE — 1159F PR MEDICATION LIST DOCUMENTED IN MEDICAL RECORD: ICD-10-PCS | Mod: CPTII,S$GLB,, | Performed by: INTERNAL MEDICINE

## 2021-11-10 PROCEDURE — 3044F PR MOST RECENT HEMOGLOBIN A1C LEVEL <7.0%: ICD-10-PCS | Mod: CPTII,S$GLB,, | Performed by: INTERNAL MEDICINE

## 2021-11-10 PROCEDURE — 3008F BODY MASS INDEX DOCD: CPT | Mod: CPTII,S$GLB,, | Performed by: INTERNAL MEDICINE

## 2021-11-10 PROCEDURE — 99999 PR PBB SHADOW E&M-EST. PATIENT-LVL IV: CPT | Mod: PBBFAC,,, | Performed by: INTERNAL MEDICINE

## 2021-11-10 PROCEDURE — 3044F HG A1C LEVEL LT 7.0%: CPT | Mod: CPTII,S$GLB,, | Performed by: INTERNAL MEDICINE

## 2021-11-10 PROCEDURE — 1160F PR REVIEW ALL MEDS BY PRESCRIBER/CLIN PHARMACIST DOCUMENTED: ICD-10-PCS | Mod: CPTII,S$GLB,, | Performed by: INTERNAL MEDICINE

## 2021-11-10 PROCEDURE — 99213 PR OFFICE/OUTPT VISIT, EST, LEVL III, 20-29 MIN: ICD-10-PCS | Mod: S$GLB,,, | Performed by: INTERNAL MEDICINE

## 2021-11-10 PROCEDURE — 99213 OFFICE O/P EST LOW 20 MIN: CPT | Mod: S$GLB,,, | Performed by: INTERNAL MEDICINE

## 2021-11-10 PROCEDURE — 3078F PR MOST RECENT DIASTOLIC BLOOD PRESSURE < 80 MM HG: ICD-10-PCS | Mod: CPTII,S$GLB,, | Performed by: INTERNAL MEDICINE

## 2021-11-10 PROCEDURE — 1160F RVW MEDS BY RX/DR IN RCRD: CPT | Mod: CPTII,S$GLB,, | Performed by: INTERNAL MEDICINE

## 2021-11-10 PROCEDURE — 99999 PR PBB SHADOW E&M-EST. PATIENT-LVL IV: ICD-10-PCS | Mod: PBBFAC,,, | Performed by: INTERNAL MEDICINE

## 2021-11-15 ENCOUNTER — PATIENT MESSAGE (OUTPATIENT)
Dept: NEUROLOGY | Facility: CLINIC | Age: 56
End: 2021-11-15
Payer: COMMERCIAL

## 2021-11-16 ENCOUNTER — OFFICE VISIT (OUTPATIENT)
Dept: NEUROLOGY | Facility: CLINIC | Age: 56
End: 2021-11-16
Payer: COMMERCIAL

## 2021-11-16 VITALS
HEART RATE: 87 BPM | DIASTOLIC BLOOD PRESSURE: 72 MMHG | WEIGHT: 176.94 LBS | BODY MASS INDEX: 24.77 KG/M2 | SYSTOLIC BLOOD PRESSURE: 126 MMHG | HEIGHT: 71 IN

## 2021-11-16 DIAGNOSIS — Z87.11 HISTORY OF GASTRIC ULCER: ICD-10-CM

## 2021-11-16 DIAGNOSIS — G47.33 OBSTRUCTIVE SLEEP APNEA ON CPAP: ICD-10-CM

## 2021-11-16 DIAGNOSIS — F43.9 STRESS: ICD-10-CM

## 2021-11-16 DIAGNOSIS — G43.019 INTRACTABLE MIGRAINE WITHOUT AURA AND WITHOUT STATUS MIGRAINOSUS: Primary | ICD-10-CM

## 2021-11-16 DIAGNOSIS — G44.209 ACUTE NON INTRACTABLE TENSION-TYPE HEADACHE: ICD-10-CM

## 2021-11-16 PROCEDURE — 3044F HG A1C LEVEL LT 7.0%: CPT | Mod: CPTII,S$GLB,, | Performed by: PHYSICIAN ASSISTANT

## 2021-11-16 PROCEDURE — 99214 OFFICE O/P EST MOD 30 MIN: CPT | Mod: S$GLB,,, | Performed by: PHYSICIAN ASSISTANT

## 2021-11-16 PROCEDURE — 3074F SYST BP LT 130 MM HG: CPT | Mod: CPTII,S$GLB,, | Performed by: PHYSICIAN ASSISTANT

## 2021-11-16 PROCEDURE — 3008F BODY MASS INDEX DOCD: CPT | Mod: CPTII,S$GLB,, | Performed by: PHYSICIAN ASSISTANT

## 2021-11-16 PROCEDURE — 1160F PR REVIEW ALL MEDS BY PRESCRIBER/CLIN PHARMACIST DOCUMENTED: ICD-10-PCS | Mod: CPTII,S$GLB,, | Performed by: PHYSICIAN ASSISTANT

## 2021-11-16 PROCEDURE — 3008F PR BODY MASS INDEX (BMI) DOCUMENTED: ICD-10-PCS | Mod: CPTII,S$GLB,, | Performed by: PHYSICIAN ASSISTANT

## 2021-11-16 PROCEDURE — 3044F PR MOST RECENT HEMOGLOBIN A1C LEVEL <7.0%: ICD-10-PCS | Mod: CPTII,S$GLB,, | Performed by: PHYSICIAN ASSISTANT

## 2021-11-16 PROCEDURE — 99999 PR PBB SHADOW E&M-EST. PATIENT-LVL III: ICD-10-PCS | Mod: PBBFAC,,, | Performed by: PHYSICIAN ASSISTANT

## 2021-11-16 PROCEDURE — 1160F RVW MEDS BY RX/DR IN RCRD: CPT | Mod: CPTII,S$GLB,, | Performed by: PHYSICIAN ASSISTANT

## 2021-11-16 PROCEDURE — 99999 PR PBB SHADOW E&M-EST. PATIENT-LVL III: CPT | Mod: PBBFAC,,, | Performed by: PHYSICIAN ASSISTANT

## 2021-11-16 PROCEDURE — 3074F PR MOST RECENT SYSTOLIC BLOOD PRESSURE < 130 MM HG: ICD-10-PCS | Mod: CPTII,S$GLB,, | Performed by: PHYSICIAN ASSISTANT

## 2021-11-16 PROCEDURE — 1159F PR MEDICATION LIST DOCUMENTED IN MEDICAL RECORD: ICD-10-PCS | Mod: CPTII,S$GLB,, | Performed by: PHYSICIAN ASSISTANT

## 2021-11-16 PROCEDURE — 3078F PR MOST RECENT DIASTOLIC BLOOD PRESSURE < 80 MM HG: ICD-10-PCS | Mod: CPTII,S$GLB,, | Performed by: PHYSICIAN ASSISTANT

## 2021-11-16 PROCEDURE — 1159F MED LIST DOCD IN RCRD: CPT | Mod: CPTII,S$GLB,, | Performed by: PHYSICIAN ASSISTANT

## 2021-11-16 PROCEDURE — 99214 PR OFFICE/OUTPT VISIT, EST, LEVL IV, 30-39 MIN: ICD-10-PCS | Mod: S$GLB,,, | Performed by: PHYSICIAN ASSISTANT

## 2021-11-16 PROCEDURE — 3078F DIAST BP <80 MM HG: CPT | Mod: CPTII,S$GLB,, | Performed by: PHYSICIAN ASSISTANT

## 2021-11-16 RX ORDER — METHYLPREDNISOLONE 4 MG/1
TABLET ORAL
Qty: 1 EACH | Refills: 0 | Status: SHIPPED | OUTPATIENT
Start: 2021-11-16 | End: 2022-04-12

## 2021-11-27 ENCOUNTER — PATIENT MESSAGE (OUTPATIENT)
Dept: INTERNAL MEDICINE | Facility: CLINIC | Age: 56
End: 2021-11-27
Payer: COMMERCIAL

## 2021-12-03 ENCOUNTER — PATIENT OUTREACH (OUTPATIENT)
Dept: ADMINISTRATIVE | Facility: OTHER | Age: 56
End: 2021-12-03
Payer: COMMERCIAL

## 2022-03-08 ENCOUNTER — PATIENT MESSAGE (OUTPATIENT)
Dept: ENDOSCOPY | Facility: HOSPITAL | Age: 57
End: 2022-03-08
Payer: COMMERCIAL

## 2022-03-24 ENCOUNTER — PATIENT OUTREACH (OUTPATIENT)
Dept: ADMINISTRATIVE | Facility: OTHER | Age: 57
End: 2022-03-24
Payer: COMMERCIAL

## 2022-03-24 NOTE — PROGRESS NOTES
Care Everywhere: updated  Immunization: updated  Health Maintenance: updated  Media Review:   Legacy Review:   DIS:  Order placed:   Upcoming appts:colonoscopy 4.25  EFAX:  Task Tickets:  Referrals:

## 2022-03-24 NOTE — PROGRESS NOTES
"Subjective:      Arpan Gamboa is a 56 y.o. male who was self-referred for evaluation of urinary frequency and urge incontinence    He saw Dr. Clarke about 5 years ago but apparently did not follow up    Also has a history of stones      Previous was seen at [facility] for OAB  Responded well to ditropan    Now with urge and freq.      No dysuria    The following portions of the patient's history were reviewed and updated as appropriate: allergies, current medications, past family history, past medical history, past social history, past surgical history and problem list.    Review of Systems  Constitutional: no fever or chills  ENT: no nasal congestion or sore throat  Respiratory: no cough or shortness of breath  Cardiovascular: no chest pain or palpitations  Gastrointestinal: no nausea or vomiting, tolerating diet  Genitourinary: as per HPI  Hematologic/Lymphatic: no easy bruising or lymphadenopathy  Musculoskeletal: no arthralgias or myalgias  Neurological: no seizures or tremors  Behavioral/Psych: no auditory or visual hallucinations     Objective:   Vitals: /65   Pulse 78   Ht 5' 11" (1.803 m)   Wt 79.8 kg (176 lb)   BMI 24.55 kg/m²     Physical Exam   General: alert and oriented, no acute distress  Head: normocephalic, atraumatic  Neck: supple, no lymphadenopathy, normal ROM, no masses  Respiratory: Symmetric expansion, non-labored breathing  Cardiovascular: regular rate and rhythm, nomal pulses, no peripheral edema  Abdomen: soft, non tender, non distended, no palpable masses, no hernias, no hepatomegaly or splenomegaly  Genitourinary:   Penis: normal, no lesions, patent orthotopic meatus, no plaques  Scrotum: no rashes or skin changes;   Testes: descended bilaterally, no masses, nontender, normal epididymides bilaterally, no hydroceles  Prostate: 45g no nodules; seminal vesicles not palpated  Rectum: normal rectal tone, no rectal mass, normal perineum  Lymphatic: no inguinal nodes  Skin: normal " coloration and turgor, no rashes, no suspicious skin lesions noted  Neuro: alert and oriented x3, no gross deficits  Psych: normal judgment and insight, normal mood/affect and non-anxious    Physical Exam    Lab Review   Urinalysis demonstrates +wbc  +nitrite  Trace blood    Lab Results   Component Value Date    WBC 4.84 06/07/2021    HGB 14.1 06/07/2021    HCT 42.6 06/07/2021    MCV 93 06/07/2021     (L) 06/07/2021     Lab Results   Component Value Date    CREATININE 0.8 06/07/2021    BUN 18 06/07/2021     Lab Results   Component Value Date    PSA 0.37 06/07/2021     Imaging  PVR 73cc    Assessment:     1. Benign prostatic hyperplasia with urinary frequency    2. Increased frequency of urination    3. History of kidney stones    4. Urinary tract infection without hematuria, site unspecified      -  Plan:     Orders Placed This Encounter    Urine culture    X-Ray KUB    US Kidney    POCT URINE DIPSTICK WITHOUT MICROSCOPE    POCT Bladder Scan    sulfamethoxazole-trimethoprim 400-80mg (BACTRIM,SEPTRA) 400-80 mg per tablet      REnal US  KUB  Ditropan  Urine cs  Bactrim    RTC to see NP; he prefers to be followed at [facility]; will notify Dr Clarke

## 2022-03-25 ENCOUNTER — OFFICE VISIT (OUTPATIENT)
Dept: UROLOGY | Facility: CLINIC | Age: 57
End: 2022-03-25
Payer: COMMERCIAL

## 2022-03-25 VITALS
DIASTOLIC BLOOD PRESSURE: 65 MMHG | HEART RATE: 78 BPM | BODY MASS INDEX: 24.64 KG/M2 | SYSTOLIC BLOOD PRESSURE: 121 MMHG | HEIGHT: 71 IN | WEIGHT: 176 LBS

## 2022-03-25 DIAGNOSIS — N40.1 BENIGN PROSTATIC HYPERPLASIA WITH URINARY FREQUENCY: Primary | ICD-10-CM

## 2022-03-25 DIAGNOSIS — Z87.442 HISTORY OF KIDNEY STONES: ICD-10-CM

## 2022-03-25 DIAGNOSIS — N39.0 URINARY TRACT INFECTION WITHOUT HEMATURIA, SITE UNSPECIFIED: ICD-10-CM

## 2022-03-25 DIAGNOSIS — R35.0 INCREASED FREQUENCY OF URINATION: ICD-10-CM

## 2022-03-25 DIAGNOSIS — R35.0 BENIGN PROSTATIC HYPERPLASIA WITH URINARY FREQUENCY: Primary | ICD-10-CM

## 2022-03-25 LAB
BILIRUB SERPL-MCNC: NEGATIVE MG/DL
BLOOD URINE, POC: NORMAL
CLARITY, POC UA: CLEAR
COLOR, POC UA: YELLOW
GLUCOSE UR QL STRIP: NORMAL
KETONES UR QL STRIP: NEGATIVE
LEUKOCYTE ESTERASE URINE, POC: NORMAL
NITRITE, POC UA: POSITIVE
PH, POC UA: 5
POC RESIDUAL URINE VOLUME: NORMAL (ref 0–100)
PROTEIN, POC: NEGATIVE
SPECIFIC GRAVITY, POC UA: 1.02
UROBILINOGEN, POC UA: NORMAL

## 2022-03-25 PROCEDURE — 51798 US URINE CAPACITY MEASURE: CPT | Mod: S$GLB,,, | Performed by: UROLOGY

## 2022-03-25 PROCEDURE — 3008F BODY MASS INDEX DOCD: CPT | Mod: CPTII,S$GLB,, | Performed by: UROLOGY

## 2022-03-25 PROCEDURE — 3078F PR MOST RECENT DIASTOLIC BLOOD PRESSURE < 80 MM HG: ICD-10-PCS | Mod: CPTII,S$GLB,, | Performed by: UROLOGY

## 2022-03-25 PROCEDURE — 81002 POCT URINE DIPSTICK WITHOUT MICROSCOPE: ICD-10-PCS | Mod: S$GLB,,, | Performed by: UROLOGY

## 2022-03-25 PROCEDURE — 3074F SYST BP LT 130 MM HG: CPT | Mod: CPTII,S$GLB,, | Performed by: UROLOGY

## 2022-03-25 PROCEDURE — 3074F PR MOST RECENT SYSTOLIC BLOOD PRESSURE < 130 MM HG: ICD-10-PCS | Mod: CPTII,S$GLB,, | Performed by: UROLOGY

## 2022-03-25 PROCEDURE — 51798 POCT BLADDER SCAN: ICD-10-PCS | Mod: S$GLB,,, | Performed by: UROLOGY

## 2022-03-25 PROCEDURE — 99204 OFFICE O/P NEW MOD 45 MIN: CPT | Mod: S$GLB,,, | Performed by: UROLOGY

## 2022-03-25 PROCEDURE — 81002 URINALYSIS NONAUTO W/O SCOPE: CPT | Mod: S$GLB,,, | Performed by: UROLOGY

## 2022-03-25 PROCEDURE — 87086 URINE CULTURE/COLONY COUNT: CPT | Performed by: UROLOGY

## 2022-03-25 PROCEDURE — 99204 PR OFFICE/OUTPT VISIT, NEW, LEVL IV, 45-59 MIN: ICD-10-PCS | Mod: S$GLB,,, | Performed by: UROLOGY

## 2022-03-25 PROCEDURE — 1159F PR MEDICATION LIST DOCUMENTED IN MEDICAL RECORD: ICD-10-PCS | Mod: CPTII,S$GLB,, | Performed by: UROLOGY

## 2022-03-25 PROCEDURE — 1159F MED LIST DOCD IN RCRD: CPT | Mod: CPTII,S$GLB,, | Performed by: UROLOGY

## 2022-03-25 PROCEDURE — 3078F DIAST BP <80 MM HG: CPT | Mod: CPTII,S$GLB,, | Performed by: UROLOGY

## 2022-03-25 PROCEDURE — 3008F PR BODY MASS INDEX (BMI) DOCUMENTED: ICD-10-PCS | Mod: CPTII,S$GLB,, | Performed by: UROLOGY

## 2022-03-25 RX ORDER — SULFAMETHOXAZOLE AND TRIMETHOPRIM 400; 80 MG/1; MG/1
1 TABLET ORAL 2 TIMES DAILY
Qty: 14 TABLET | Refills: 0 | Status: SHIPPED | OUTPATIENT
Start: 2022-03-25 | End: 2022-04-01

## 2022-03-25 RX ORDER — OXYBUTYNIN CHLORIDE 5 MG/1
5 TABLET, EXTENDED RELEASE ORAL DAILY
Qty: 30 TABLET | Refills: 11 | Status: SHIPPED | OUTPATIENT
Start: 2022-03-25 | End: 2023-04-21 | Stop reason: SDUPTHER

## 2022-03-26 ENCOUNTER — HOSPITAL ENCOUNTER (OUTPATIENT)
Dept: RADIOLOGY | Facility: OTHER | Age: 57
Discharge: HOME | End: 2022-03-26
Attending: UROLOGY
Payer: COMMERCIAL

## 2022-03-26 DIAGNOSIS — Z87.442 HISTORY OF KIDNEY STONES: ICD-10-CM

## 2022-03-26 LAB — BACTERIA UR CULT: NO GROWTH

## 2022-03-26 PROCEDURE — 76770 US EXAM ABDO BACK WALL COMP: CPT | Mod: TC

## 2022-03-26 PROCEDURE — 74018 RADEX ABDOMEN 1 VIEW: CPT | Mod: TC,FY

## 2022-03-26 PROCEDURE — 74018 RADEX ABDOMEN 1 VIEW: CPT | Mod: 26,,, | Performed by: RADIOLOGY

## 2022-03-26 PROCEDURE — 74018 XR KUB: ICD-10-PCS | Mod: 26,,, | Performed by: RADIOLOGY

## 2022-03-26 PROCEDURE — 76770 US EXAM ABDO BACK WALL COMP: CPT | Mod: 26,,, | Performed by: RADIOLOGY

## 2022-03-26 PROCEDURE — 76770 US KIDNEY: ICD-10-PCS | Mod: 26,,, | Performed by: RADIOLOGY

## 2022-04-11 ENCOUNTER — OFFICE VISIT (OUTPATIENT)
Dept: UROLOGY | Facility: CLINIC | Age: 57
End: 2022-04-11
Payer: COMMERCIAL

## 2022-04-11 VITALS — DIASTOLIC BLOOD PRESSURE: 64 MMHG | HEART RATE: 71 BPM | SYSTOLIC BLOOD PRESSURE: 111 MMHG

## 2022-04-11 DIAGNOSIS — N32.81 OAB (OVERACTIVE BLADDER): Primary | ICD-10-CM

## 2022-04-11 DIAGNOSIS — N20.0 NEPHROLITHIASIS: ICD-10-CM

## 2022-04-11 LAB
BILIRUB SERPL-MCNC: NEGATIVE MG/DL
BLOOD URINE, POC: NEGATIVE
CLARITY, POC UA: CLEAR
COLOR, POC UA: NORMAL
GLUCOSE UR QL STRIP: NORMAL
KETONES UR QL STRIP: NEGATIVE
LEUKOCYTE ESTERASE URINE, POC: NEGATIVE
NITRITE, POC UA: NEGATIVE
PH, POC UA: 6
POC RESIDUAL URINE VOLUME: 11 ML (ref 0–100)
PROTEIN, POC: NEGATIVE
SPECIFIC GRAVITY, POC UA: 1.01
UROBILINOGEN, POC UA: NORMAL

## 2022-04-11 PROCEDURE — 1159F PR MEDICATION LIST DOCUMENTED IN MEDICAL RECORD: ICD-10-PCS | Mod: CPTII,S$GLB,, | Performed by: NURSE PRACTITIONER

## 2022-04-11 PROCEDURE — 99213 PR OFFICE/OUTPT VISIT, EST, LEVL III, 20-29 MIN: ICD-10-PCS | Mod: S$GLB,,, | Performed by: NURSE PRACTITIONER

## 2022-04-11 PROCEDURE — 3078F DIAST BP <80 MM HG: CPT | Mod: CPTII,S$GLB,, | Performed by: NURSE PRACTITIONER

## 2022-04-11 PROCEDURE — 1160F RVW MEDS BY RX/DR IN RCRD: CPT | Mod: CPTII,S$GLB,, | Performed by: NURSE PRACTITIONER

## 2022-04-11 PROCEDURE — 3078F PR MOST RECENT DIASTOLIC BLOOD PRESSURE < 80 MM HG: ICD-10-PCS | Mod: CPTII,S$GLB,, | Performed by: NURSE PRACTITIONER

## 2022-04-11 PROCEDURE — 51798 US URINE CAPACITY MEASURE: CPT | Mod: S$GLB,,, | Performed by: NURSE PRACTITIONER

## 2022-04-11 PROCEDURE — 1160F PR REVIEW ALL MEDS BY PRESCRIBER/CLIN PHARMACIST DOCUMENTED: ICD-10-PCS | Mod: CPTII,S$GLB,, | Performed by: NURSE PRACTITIONER

## 2022-04-11 PROCEDURE — 99213 OFFICE O/P EST LOW 20 MIN: CPT | Mod: S$GLB,,, | Performed by: NURSE PRACTITIONER

## 2022-04-11 PROCEDURE — 81002 URINALYSIS NONAUTO W/O SCOPE: CPT | Mod: S$GLB,,, | Performed by: NURSE PRACTITIONER

## 2022-04-11 PROCEDURE — 81002 POCT URINE DIPSTICK WITHOUT MICROSCOPE: ICD-10-PCS | Mod: S$GLB,,, | Performed by: NURSE PRACTITIONER

## 2022-04-11 PROCEDURE — 51798 POCT BLADDER SCAN: ICD-10-PCS | Mod: S$GLB,,, | Performed by: NURSE PRACTITIONER

## 2022-04-11 PROCEDURE — 1159F MED LIST DOCD IN RCRD: CPT | Mod: CPTII,S$GLB,, | Performed by: NURSE PRACTITIONER

## 2022-04-11 PROCEDURE — 3074F PR MOST RECENT SYSTOLIC BLOOD PRESSURE < 130 MM HG: ICD-10-PCS | Mod: CPTII,S$GLB,, | Performed by: NURSE PRACTITIONER

## 2022-04-11 PROCEDURE — 3074F SYST BP LT 130 MM HG: CPT | Mod: CPTII,S$GLB,, | Performed by: NURSE PRACTITIONER

## 2022-04-11 NOTE — PROGRESS NOTES
"Subjective:      Arpan Gamboa is a 56 y.o. male who returns today regarding his nephrolithiasis and OAB. He is an established patient of Dr. Manrique and is new to me today.     On ditropan for OAB. This is working well. Denies adverse SE.     VITOR (3/26/22)- "The right kidney measures 10.5 cm cm.  The left kidney measures 12.1 cm.  No cortical thinning. No loss of corticomedullary distinction. Resistive index is unremarkable.  No mass. Echogenic foci in the right mid kidney measuring 7 mm and left lower pole measuring 4 mm.  No hydronephrosis."  KUB (3/26/22)- "Slight levocurvature noted to the lumbar spine.  Probable phleboliths in the pelvis.  SI joints unremarkable.  No renal calcifications identified, noting limited sensitivity due to overlying soft tissue/bowel. Further evaluation could be performed with CT, if indicated."    Component      Latest Ref Rng & Units 3/25/2022   Urine Culture, Routine No growth     The following portions of the patient's history were reviewed and updated as appropriate: allergies, current medications, past family history, past medical history, past social history, past surgical history and problem list.    Review of Systems  Constitutional: no fever or chills  ENT: no nasal congestion or sore throat  Respiratory: no cough or shortness of breath  Cardiovascular: no chest pain or palpitations  Gastrointestinal: no nausea or vomiting, tolerating diet  Genitourinary: as per HPI  Hematologic/Lymphatic: no easy bruising or lymphadenopathy  Musculoskeletal: no arthralgias or myalgias  Neurological: no seizures or tremors  Behavioral/Psych: no auditory or visual hallucinations     Objective:   Vitals:   Vitals:    04/11/22 1411   BP: 111/64   Pulse: 71     Physical Exam   General: alert and oriented, no acute distress  Head: normocephalic, atraumatic  Neck: supple, normal ROM  Respiratory: Symmetric expansion, non-labored breathing  Cardiovascular: regular rate and rhythm  Abdomen: soft, " "non tender, non distended  Genitourinary: deferred   Skin: normal coloration and turgor, no rashes, no suspicious skin lesions noted  Neuro: alert and oriented x3, no gross deficits  Psych: normal judgment and insight, normal mood/affect and non-anxious  No CVA tenderness    Lab Review   Urinalysis demonstrates negative for all components  PVR: 11 mL  Lab Results   Component Value Date    WBC 4.84 06/07/2021    HGB 14.1 06/07/2021    HCT 42.6 06/07/2021    MCV 93 06/07/2021     (L) 06/07/2021     Lab Results   Component Value Date    CREATININE 0.8 06/07/2021    BUN 18 06/07/2021     Lab Results   Component Value Date    PSA 0.37 06/07/2021     Imaging   (all images personally reviewed; agree with report below)  VITOR (3/26/22)- "The right kidney measures 10.5 cm cm.  The left kidney measures 12.1 cm.  No cortical thinning. No loss of corticomedullary distinction. Resistive index is unremarkable.  No mass. Echogenic foci in the right mid kidney measuring 7 mm and left lower pole measuring 4 mm.  No hydronephrosis."  KUB (3/26/22)- "Slight levocurvature noted to the lumbar spine.  Probable phleboliths in the pelvis.  SI joints unremarkable.  No renal calcifications identified, noting limited sensitivity due to overlying soft tissue/bowel. Further evaluation could be performed with CT, if indicated."    Assessment:   OAB  Nephrolithiasis    Plan:   Arpan was seen today for follow-up.    Diagnoses and all orders for this visit:    OAB (overactive bladder)  -     POCT URINE DIPSTICK WITHOUT MICROSCOPE  -     POCT Bladder Scan    Nephrolithiasis  -     US Retroperitoneal Complete (Kidney and; Future  -     X-Ray KUB; Future    Plan:  --Continue ditropan for another 1-2 months then discontinue  --Discussed common bladder irritants such as caffeine, alcohol, citrus, and acidic and spicy foods. Encouraged to minimize in diet to reduce symptoms.  --Follow up in 1 year for stone surveillance (suspect stones noted on US " could be cortical scarring noted on CT)

## 2022-04-12 ENCOUNTER — OFFICE VISIT (OUTPATIENT)
Dept: INTERNAL MEDICINE | Facility: CLINIC | Age: 57
End: 2022-04-12
Payer: COMMERCIAL

## 2022-04-12 VITALS
SYSTOLIC BLOOD PRESSURE: 120 MMHG | DIASTOLIC BLOOD PRESSURE: 72 MMHG | HEART RATE: 71 BPM | OXYGEN SATURATION: 98 % | WEIGHT: 181.88 LBS | BODY MASS INDEX: 25.46 KG/M2 | HEIGHT: 71 IN

## 2022-04-12 DIAGNOSIS — R25.2 CRAMPING OF FEET: ICD-10-CM

## 2022-04-12 DIAGNOSIS — Z12.12 ENCOUNTER FOR COLORECTAL CANCER SCREENING: ICD-10-CM

## 2022-04-12 DIAGNOSIS — J30.1 SEASONAL ALLERGIC RHINITIS DUE TO POLLEN: ICD-10-CM

## 2022-04-12 DIAGNOSIS — Z00.00 ANNUAL PHYSICAL EXAM: Primary | ICD-10-CM

## 2022-04-12 DIAGNOSIS — M23.206 OLD TEAR OF MENISCUS OF RIGHT KNEE, UNSPECIFIED MENISCUS, UNSPECIFIED TEAR TYPE: ICD-10-CM

## 2022-04-12 DIAGNOSIS — Z12.11 ENCOUNTER FOR COLORECTAL CANCER SCREENING: ICD-10-CM

## 2022-04-12 DIAGNOSIS — E78.00 ELEVATED CHOLESTEROL: ICD-10-CM

## 2022-04-12 DIAGNOSIS — E55.9 VITAMIN D DEFICIENCY: ICD-10-CM

## 2022-04-12 DIAGNOSIS — Z23 HIGH PRIORITY FOR COVID-19 VACCINATION: ICD-10-CM

## 2022-04-12 DIAGNOSIS — Z23 NEED FOR ZOSTER VACCINE: ICD-10-CM

## 2022-04-12 PROCEDURE — 99396 PR PREVENTIVE VISIT,EST,40-64: ICD-10-PCS | Mod: S$GLB,,, | Performed by: INTERNAL MEDICINE

## 2022-04-12 PROCEDURE — 99999 PR PBB SHADOW E&M-EST. PATIENT-LVL III: CPT | Mod: PBBFAC,,, | Performed by: INTERNAL MEDICINE

## 2022-04-12 PROCEDURE — 1160F RVW MEDS BY RX/DR IN RCRD: CPT | Mod: CPTII,S$GLB,, | Performed by: INTERNAL MEDICINE

## 2022-04-12 PROCEDURE — 1159F PR MEDICATION LIST DOCUMENTED IN MEDICAL RECORD: ICD-10-PCS | Mod: CPTII,S$GLB,, | Performed by: INTERNAL MEDICINE

## 2022-04-12 PROCEDURE — 3074F PR MOST RECENT SYSTOLIC BLOOD PRESSURE < 130 MM HG: ICD-10-PCS | Mod: CPTII,S$GLB,, | Performed by: INTERNAL MEDICINE

## 2022-04-12 PROCEDURE — 1160F PR REVIEW ALL MEDS BY PRESCRIBER/CLIN PHARMACIST DOCUMENTED: ICD-10-PCS | Mod: CPTII,S$GLB,, | Performed by: INTERNAL MEDICINE

## 2022-04-12 PROCEDURE — 99396 PREV VISIT EST AGE 40-64: CPT | Mod: S$GLB,,, | Performed by: INTERNAL MEDICINE

## 2022-04-12 PROCEDURE — 3008F BODY MASS INDEX DOCD: CPT | Mod: CPTII,S$GLB,, | Performed by: INTERNAL MEDICINE

## 2022-04-12 PROCEDURE — 3008F PR BODY MASS INDEX (BMI) DOCUMENTED: ICD-10-PCS | Mod: CPTII,S$GLB,, | Performed by: INTERNAL MEDICINE

## 2022-04-12 PROCEDURE — 1159F MED LIST DOCD IN RCRD: CPT | Mod: CPTII,S$GLB,, | Performed by: INTERNAL MEDICINE

## 2022-04-12 PROCEDURE — 99999 PR PBB SHADOW E&M-EST. PATIENT-LVL III: ICD-10-PCS | Mod: PBBFAC,,, | Performed by: INTERNAL MEDICINE

## 2022-04-12 PROCEDURE — 3078F DIAST BP <80 MM HG: CPT | Mod: CPTII,S$GLB,, | Performed by: INTERNAL MEDICINE

## 2022-04-12 PROCEDURE — 3078F PR MOST RECENT DIASTOLIC BLOOD PRESSURE < 80 MM HG: ICD-10-PCS | Mod: CPTII,S$GLB,, | Performed by: INTERNAL MEDICINE

## 2022-04-12 PROCEDURE — 3074F SYST BP LT 130 MM HG: CPT | Mod: CPTII,S$GLB,, | Performed by: INTERNAL MEDICINE

## 2022-04-12 NOTE — PROGRESS NOTES
Subjective:       Patient ID: Arpan Gamboa is a 56 y.o. male who  has a past medical history of Allergy, Asthma, Sleep apnea, and Tachycardia.    Chief Complaint: Annual Exam     History was obtained from the patient and supplemented through chart review.  Following with sleep clinic for AMANDEEP.    Does office work.  Originally from [facility] [place] (has green card here); his family moved to [place].  He will travel to [place], and he and his wife will travel to [place]. His wife is Vanna Gonzales; she is from [place].     HPI    Has a form for his insurance.      AR:  Allergies to dust. Worse in the spring. Takes Claritin PRN. Restarted Flonase daily.  Seeing OSH allergist in [place], Dr. Mora, and ENT at [facility].     Cramps of fingers, toes:   Cramping, can be painful. Feels that his toes are crossing. Started taking Mg with resolution. Recurred when he forgot to take Mg for a few days. No diarrhea. Is well hydrated. No dietary restrictions.    Chronic R knee pain c Right meniscal tear:  Used to run marathons until chronic knee issues. MRI with meniscal tear.  Following with [facility]. Likes to jog, but hasn't been able to do so d/t chronic R knee pain. Had put off eval d/t COVID.    Elevated cholesterol:    Lab Results   Component Value Date    LDLCALC 102.8 06/07/2021     The 10-year ASCVD risk score (Aman HAWTHORNE Jr., et al., 2013) is: 4.2%    Values used to calculate the score:      Age: 56 years      Sex: Male      Is Non- : No      Diabetic: No      Tobacco smoker: No      Systolic Blood Pressure: 120 mmHg      Is BP treated: No      HDL Cholesterol: 49 mg/dL      Total Cholesterol: 170 mg/dL    Vitamin D deficiency:    Lab Results   Component Value Date    [id number] 45 06/07/2021    [id number] 27 (L) 12/20/2019     Exercise: not exercising regularly. Likes to jog, but has R knee pain as above. Some walking. Admits to decreased energy. Felt better in the past when he was  exercising regularly.    Had the COVID booster 11/2022.              Not addressed today.  WPW:  Dx with WPW at age 16 and treated with propranolol.  Episodes of palpitations, lightheadedness 4 per year, lasting 10-20 minutes, often >200 HR.  No syncope, CP, SOB.  TTE WNL.  Holter in 2017 with many PVCs.  Required adenosine .  S/p ablation on 01/06/2020 the [facility].  Stopped Verapamil.  No palpitations, CP, SOB, lightheadedness, dizziness, syncope.      Following with Cardiology at [facility] and [facility].  Recommended following up with EP p.r.n. and cardiology yearly.   Stress TTE 11/2020 w/o ischemia or accelerated conduction.  SOB thought to be due to mild asthma.   Doing well after ablation.  Following with cardiology yearly.    AMANDEEP:    Severe.  Diagnosed in 2017. On CPAP.  Following with sleep clinic    Migraine:  Following with Neurology.  Has Flexeril p.r.n..  Stress contributing; he declined referral to therapy.  AMANDEEP as above.    Mild intermittent Asthma:    History of childhood that required several hospitalizations.  Following with [facility] allergist virtually with allergy specialist in [place]. Doing well off Qvar. AR, GERD as below. GERD improved. OSH allergist is concerned for vocal cord dysfunction. Has been coughing after drinking water. Following with ENT.    Mucocele, tonsil stone:  Had laryngoscopy with ENT.  Recommended saltwater gargling.    Tinnitus, SNHL:  Since 6/2021. Continuous b/l tinnitus with slight otalgia b/l.  Mostly occurs the entire day.  No vertigo, decreased hearing.  No fever, d/c.  Does not swim.  Inserts Q tips in his ears intermitently.  Has been remotely working from home with many virtual meetings daily and is wearing a headset.   Saw ENT and reassurred. Recheck hearing in 1 year.  Persistent and he is trying to ignore the tinnitus.    GERD:    History of EGD 20 years ago.  Used to be on Protonix daily.  Did not have H pylori test done.  Working on dietary  "changes with improvement of sx.  Following with ENT for vocal cord evaluation.    BPH:  Increased frequency q45 min-1hr. Urinary urgency, nocturia 4-5/night.  Does drink water at night.  Following with Urology.  Never started Flomax d/t concern for SE.  On ditropan for OAB with great improvement; planning on this for another 1-2 months.  Discussed avoidance of triggers.    Nephrolithiasis:  Renal ultrasound with small nonobstructing b/l nephrolithiasis.  No hydronephrosis.  Following with Urology.  Planning on imaging in 1 year for surveillance.     Pain of left hand, Paresthesias:  Is R handed.  Tingling at digit 3 of L hand x 3 weeks.  Noticed this while cooking.  Was using a collander and had applied a lot of pressure on his 3rd finger without realizing, which caused numbness and weakness.  Since then, 2 weeks ago, has continuous numbness that is moving into his palm.  Also notices numbness and hand rigidity, which  alway occur in the AM that resolves during the day.    DDx CTS, nerve compression.  Rec wrist splints q.h.s..      Review of Systems   Constitutional: Negative for activity change and appetite change.   Respiratory: Negative for wheezing.    Cardiovascular: Negative for leg swelling.   Musculoskeletal: Positive for arthralgias. Negative for gait problem.   Skin: Negative for rash and wound.   Hematological: Negative for adenopathy.   Psychiatric/Behavioral: Negative for confusion. The patient is not nervous/anxious.        I personally reviewed Past Medical History, Past Surgical History, Social History, and Family History.    Objective:      Vitals:    04/12/22 1201   BP: 120/72   BP Location: Left arm   Patient Position: Sitting   Pulse: 71   SpO2: 98%   Weight: 82.5 kg (181 lb 14.1 oz)   Height: 5' 11" (1.803 m)      Physical Exam  Constitutional:       General: He is not in acute distress.     Appearance: He is well-developed. He is not diaphoretic.   HENT:      Head: Normocephalic and atraumatic. "      Nose: Nose normal.      Mouth/Throat:      Pharynx: No oropharyngeal exudate.   Eyes:      General: No scleral icterus.        Right eye: No discharge.         Left eye: No discharge.   Neck:      Thyroid: No thyromegaly.      Trachea: No tracheal deviation.   Cardiovascular:      Rate and Rhythm: Normal rate and regular rhythm.      Heart sounds: Normal heart sounds. No murmur heard.  Pulmonary:      Effort: Pulmonary effort is normal. No respiratory distress.      Breath sounds: Normal breath sounds. No wheezing.   Abdominal:      General: Bowel sounds are normal. There is no distension.      Palpations: Abdomen is soft.      Tenderness: There is no abdominal tenderness.   Musculoskeletal:         General: No deformity.      Cervical back: Neck supple.      Right lower leg: No edema.      Left lower leg: No edema.   Lymphadenopathy:      Cervical: No cervical adenopathy.   Skin:     General: Skin is warm and dry.      Findings: No erythema.   Neurological:      Mental Status: He is alert.      Gait: Gait normal.   Psychiatric:         Behavior: Behavior normal.           Lab Results   Component Value Date    WBC 4.84 06/07/2021    HGB 14.1 06/07/2021    HCT 42.6 06/07/2021     (L) 06/07/2021    CHOL 170 06/07/2021    TRIG 91 06/07/2021    HDL 49 06/07/2021    ALT 30 06/07/2021    AST 25 06/07/2021     06/07/2021    K 4.2 06/07/2021     06/07/2021    CREATININE 0.8 06/07/2021    BUN 18 06/07/2021    CO2 24 06/07/2021    TSH 0.872 12/20/2019    PSA 0.37 06/07/2021    HGBA1C 5.0 06/07/2021       The 10-year ASCVD risk score (Aman CHRISTOPH Jr., et al., 2013) is: 4.2%    Values used to calculate the score:      Age: 56 years      Sex: Male      Is Non- : No      Diabetic: No      Tobacco smoker: No      Systolic Blood Pressure: 120 mmHg      Is BP treated: No      HDL Cholesterol: 49 mg/dL      Total Cholesterol: 170 mg/dL    (Imaging have been independently reviewed)  Renal  ultrasound with small nonobstructing b/l nephrolithiasis.  No hydronephrosis.    Assessment:       1. Annual physical exam    2. Seasonal allergic rhinitis due to pollen    3. Cramping of feet    4. Old tear of meniscus of right knee, unspecified meniscus, unspecified tear type    5. Elevated cholesterol    6. Vitamin D deficiency    7. Encounter for colorectal cancer screening    8. Need for zoster vaccine    9. High priority for COVID-19 vaccination          Plan:       Arpan was seen today for annual exam.    Diagnoses and all orders for this visit:    Annual physical exam  Comments:  He will return for labs and then  the form in clinic when complete. Encouraged exercise regularly; provided exercise videos.  Orders:  -     Lipid Panel; Future  -     Vitamin D; Future  -     CBC Auto Differential; Future  -     Comprehensive Metabolic Panel; Future  -     Magnesium; Future  -     Hemoglobin A1C; Future    Seasonal allergic rhinitis due to pollen  Comments:  Seasonal; worsened lately. Continue antihistamine p.r.n., Flonase regularly.    Cramping of feet  Comments:  Improved c Mg supplement; continue. Check lytes, Mg.  Orders:  -     Vitamin D; Future  -     Comprehensive Metabolic Panel; Future  -     Magnesium; Future    Old tear of meniscus of right knee, unspecified meniscus, unspecified tear type  Comments:  Persistent. Reschedule c Sports Med. Exercise as tolerated.    Elevated cholesterol  Comments:  FLP borderline high, improved. ASCVD low.  Not on statin. Monitor FLP.  Orders:  -     Lipid Panel; Future  -     Comprehensive Metabolic Panel; Future    Vitamin D deficiency  Comments:  Improved.  Recheck level.  Orders:  -     Vitamin D; Future    Encounter for colorectal cancer screening  Comments:  Has colonoscopy scheduled.    Need for zoster vaccine  Comments:  Advised to obtain vaccine at Pharmacy.    High priority for COVID-19 vaccination  Comments:  Qualifies for the 2nd COVID booster vaccine d/t  age. Provided information to schedule COVID vaccine.         Health Maintenance   Topic Date Due    Lipid Panel  06/07/2026    TETANUS VACCINE  01/01/2029    Hepatitis C Screening  Completed     Side effects of medication(s) were discussed in detail and patient voiced understanding.  Patient will call back for any issues or complications.     RTC in 1 year(s) or sooner PRN for annual.

## 2022-04-12 NOTE — PATIENT INSTRUCTIONS
Apps:   Sworkit, 7 Minute Workout, Keep Training    YouTube:   Blogilates, Yoga with [employer] , [employer], [employer]  Walk at Home: Get Fit with Lee (Lee Wilburn), Charu Felder  [employer]  [employer]

## 2022-04-18 ENCOUNTER — OFFICE VISIT (OUTPATIENT)
Dept: NEUROLOGY | Facility: CLINIC | Age: 57
End: 2022-04-18
Payer: COMMERCIAL

## 2022-04-18 ENCOUNTER — PATIENT MESSAGE (OUTPATIENT)
Dept: INTERNAL MEDICINE | Facility: CLINIC | Age: 57
End: 2022-04-18
Payer: COMMERCIAL

## 2022-04-18 ENCOUNTER — TELEPHONE (OUTPATIENT)
Dept: OTOLARYNGOLOGY | Facility: CLINIC | Age: 57
End: 2022-04-18
Payer: COMMERCIAL

## 2022-04-18 VITALS
BODY MASS INDEX: 25.32 KG/M2 | DIASTOLIC BLOOD PRESSURE: 84 MMHG | SYSTOLIC BLOOD PRESSURE: 115 MMHG | HEART RATE: 75 BPM | WEIGHT: 180.88 LBS | HEIGHT: 71 IN

## 2022-04-18 DIAGNOSIS — R29.90 ABNORMAL NEUROLOGICAL FINDINGS: ICD-10-CM

## 2022-04-18 DIAGNOSIS — R29.90 ABNORMAL NEUROLOGICAL EXAM: Chronic | ICD-10-CM

## 2022-04-18 DIAGNOSIS — I45.6 WOLFF-PARKINSON-WHITE SYNDROME: ICD-10-CM

## 2022-04-18 DIAGNOSIS — G43.019 INTRACTABLE MIGRAINE WITHOUT AURA AND WITHOUT STATUS MIGRAINOSUS: ICD-10-CM

## 2022-04-18 DIAGNOSIS — M54.2 CERVICALGIA: ICD-10-CM

## 2022-04-18 DIAGNOSIS — H93.13 TINNITUS OF BOTH EARS: Primary | ICD-10-CM

## 2022-04-18 DIAGNOSIS — Z00.00 ANNUAL PHYSICAL EXAM: Primary | ICD-10-CM

## 2022-04-18 PROBLEM — G43.919 INTRACTABLE MIGRAINE WITHOUT STATUS MIGRAINOSUS: Status: ACTIVE | Noted: 2022-04-18

## 2022-04-18 PROCEDURE — 1159F PR MEDICATION LIST DOCUMENTED IN MEDICAL RECORD: ICD-10-PCS | Mod: CPTII,S$GLB,, | Performed by: PSYCHIATRY & NEUROLOGY

## 2022-04-18 PROCEDURE — 3074F SYST BP LT 130 MM HG: CPT | Mod: CPTII,S$GLB,, | Performed by: PSYCHIATRY & NEUROLOGY

## 2022-04-18 PROCEDURE — 99214 OFFICE O/P EST MOD 30 MIN: CPT | Mod: S$GLB,,, | Performed by: PSYCHIATRY & NEUROLOGY

## 2022-04-18 PROCEDURE — 99999 PR PBB SHADOW E&M-EST. PATIENT-LVL IV: CPT | Mod: PBBFAC,,, | Performed by: PSYCHIATRY & NEUROLOGY

## 2022-04-18 PROCEDURE — 3008F BODY MASS INDEX DOCD: CPT | Mod: CPTII,S$GLB,, | Performed by: PSYCHIATRY & NEUROLOGY

## 2022-04-18 PROCEDURE — 3079F PR MOST RECENT DIASTOLIC BLOOD PRESSURE 80-89 MM HG: ICD-10-PCS | Mod: CPTII,S$GLB,, | Performed by: PSYCHIATRY & NEUROLOGY

## 2022-04-18 PROCEDURE — 99214 PR OFFICE/OUTPT VISIT, EST, LEVL IV, 30-39 MIN: ICD-10-PCS | Mod: S$GLB,,, | Performed by: PSYCHIATRY & NEUROLOGY

## 2022-04-18 PROCEDURE — 3008F PR BODY MASS INDEX (BMI) DOCUMENTED: ICD-10-PCS | Mod: CPTII,S$GLB,, | Performed by: PSYCHIATRY & NEUROLOGY

## 2022-04-18 PROCEDURE — 1160F RVW MEDS BY RX/DR IN RCRD: CPT | Mod: CPTII,S$GLB,, | Performed by: PSYCHIATRY & NEUROLOGY

## 2022-04-18 PROCEDURE — 1159F MED LIST DOCD IN RCRD: CPT | Mod: CPTII,S$GLB,, | Performed by: PSYCHIATRY & NEUROLOGY

## 2022-04-18 PROCEDURE — 3079F DIAST BP 80-89 MM HG: CPT | Mod: CPTII,S$GLB,, | Performed by: PSYCHIATRY & NEUROLOGY

## 2022-04-18 PROCEDURE — 3074F PR MOST RECENT SYSTOLIC BLOOD PRESSURE < 130 MM HG: ICD-10-PCS | Mod: CPTII,S$GLB,, | Performed by: PSYCHIATRY & NEUROLOGY

## 2022-04-18 PROCEDURE — 99999 PR PBB SHADOW E&M-EST. PATIENT-LVL IV: ICD-10-PCS | Mod: PBBFAC,,, | Performed by: PSYCHIATRY & NEUROLOGY

## 2022-04-18 PROCEDURE — 1160F PR REVIEW ALL MEDS BY PRESCRIBER/CLIN PHARMACIST DOCUMENTED: ICD-10-PCS | Mod: CPTII,S$GLB,, | Performed by: PSYCHIATRY & NEUROLOGY

## 2022-04-18 NOTE — PROGRESS NOTES
"[facility] - NEUROLOGY 7TH [place] [facility], [place]    Date: 4/18/22  Patient Name: Arpan Gamboa   MRN: [id number]   PCP: Angeles Johnson    Chief Complaint: Headache  Subjective:   Arpan Gamboa is a 56 y.o. male presenting to clinic today for the evaluation of headache.      HPI:   Mr. Arpan Gamboa is a 56 y.o. male pmhx AMANDEEP, WPW, GERD, and migraine without aura presenting for evaluation of headache. He was previously evaluated by Nalini Willson PA-C and was determined to have migraine without aura and without status migrainosus. Current headache treatment regimen: Flexiril PRN. He has not had any migraine since his last visit in November 2021 when he saw Nalini and reports improvement in his symptoms. He reports that he has been having recurrent problems with tinnitus beginning in July 2020 and neck pain. He reports that approx 25 years ago he had a constant headache for which he was evaluated and found to have cervical spine problems. He did physical therapy for this cervical spine problem which gave him relief. He reports approx 3 migraines per year. He is curious if his tinnitus and his cervical spine are related. He has never tried anything other than ibuprofen and aleve. He believes the stress of his job also impacts his headaches (he is a  with shell). He reports getting 4-6 hours of sleep per night and reports that some of the headaches come following a poor nights sleep. He rarely drinks coffee following the pandemic. He denies any problems with fine motor skills.     Age of onset: 20s  When did they become more of a problem: November  # of Total headache days per month: 0-1  # of Migraine days per month: 0-1   Intensity of average and most severe headaches: 7/10, 10/10  Duration of headache pain: 8-12 hours  Quality of pain: Pulsating   Laterality: Bilateral  Location: "anywhere" but primarily frontal with posterior spread.  Photophobia and phonophobia: " Photophobia and Phonophobia  Nausea and vomiting: None  Causes avoidance of physical activity: Yes  Worsened by physical activity: Yes  Triggers: chocolate, stress, poor sleep  Relieves: dark room, sleep   Preventive Medications failed: None  Acute medications failed: None  Therapies tried: Physical Therapy  Supplements: Vitamin D, Calcium, Mg, Zinc  OTC Medications: Ibuprofen has been helpful for his headaches  Hx of (Bolded items are positive): depression, anxiety, fibromyalgia, autoimmune disorder, head trauma, neurological infection, glaucoma, asthma, nephrolithiasis  Is medication overuse contributing to the patient's current migraine burden: No  Aura: None  Autonomic symptoms: None  History of trauma: None  History of CNS Infection: None  Positives in bold: Hx of Kidney Stones, asthma, GI bleed, osteoporosis, CAD/MI, CVA/TIA, DM      Family Hx of migraines: None    Latest Imaging: None    Current HA Regimen:  PRN OTC ibuprofen   Flexiril PRN             PAST MEDICAL HISTORY:  Past Medical History:   Diagnosis Date    Allergy     Asthma     Sleep apnea     Tachycardia     WPW       PAST SURGICAL HISTORY:  Past Surgical History:   Procedure Laterality Date    CARDIAC ELECTROPHYSIOLOGY [facility]Y [facility]BL[facility]  [facility]    HERNIA REPAIR  approx. in 1969    hernia repair at 3 yrs old         CURRENT MEDS:  Current Outpatient Medications   Medication Sig Dispense Refill    cetirizine (ZYRTEC) 10 MG tablet Take 10 mg by mouth once daily.      cholecalciferol, vitamin D3, (VITAMIN D3) 50 mcg (2,000 unit) Cap       esomeprazole (NEXIUM) 40 MG capsule Take 40 mg by mouth once daily.      fluticasone propionate (FLONASE) 50 mcg/actuation nasal spray 1 spray by Each Nostril route once daily.      ketoconazole (NIZORAL) 2 % shampoo LATHER SCALP FOR 5-10 MIN, THEN RINSE. USE 2-3 TIMES PER WEEK. 240 mL 0    oxybutynin (DITROPAN-XL) 5 MG TR24 Take 1 tablet (5 mg total) by mouth once daily. 30  tablet 11    albuterol (VENTOLIN HFA) 90 mcg/actuation inhaler Inhale 2 puffs into the lungs daily as needed. 18 g 5    cyclobenzaprine (FLEXERIL) 5 MG tablet Take 1 tablet (5 mg total) by mouth 3 (three) times daily as needed for Muscle spasms. 30 tablet 1     No current facility-administered medications for this visit.       ALLERGIES:  Review of patient's allergies indicates:   Allergen Reactions    Allergenic extracts Shortness Of Breath     - allergy barriers for mattress & pillows 2020    Cat hair standardized allergenic extract Shortness Of Breath     - allergy barriers for mattress & pillows 2020    Dog hair standardized allergenic extract      - allergy barriers for mattress & pillows 2020       FAMILY HISTORY:  Family History   Problem Relation Age of Onset    Hypertension Mother     Hyperlipidemia Mother     Diabetes Mother     Prostatitis Father     COPD Father     Lupus Sister     Hypertension Sister     Asthma Sister     Heart attack Other     Early death Sister          at 37 years old of Lupus    Melanoma Neg Hx     Heart disease Neg Hx        SOCIAL HISTORY:  Social History     Tobacco Use    Smoking status: Passive Smoke Exposure - Never Smoker    Smokeless tobacco: Never Used   Substance Use Topics    Alcohol use: Not Currently     Comment: Less than once per week 1 glass of wine    Drug use: Never       Review of Systems:  Review of Systems   HENT: Positive for tinnitus. Negative for ear discharge, ear pain and hearing loss.    Eyes: Negative for blurred vision, double vision and photophobia.   Respiratory: Negative for cough and shortness of breath.    Cardiovascular: Negative for chest pain and palpitations.   Gastrointestinal: Negative for abdominal pain, constipation, diarrhea, heartburn, nausea and vomiting.   Genitourinary: Positive for frequency and urgency. Negative for dysuria.        He is on oxybutinin for BPH so this is also contributing to  "this   Musculoskeletal: Positive for neck pain. Negative for back pain, falls and myalgias.   Neurological: Positive for headaches. Negative for tremors, focal weakness and weakness.            Objective:     Vitals:    04/18/22 1355   BP: 115/84   Pulse: 75   Weight: 82.1 kg (180 lb 14.2 oz)   Height: 5' 11" (1.803 m)         NEUROLOGICAL EXAMINATION:     CRANIAL NERVES     CN III, IV, VI   Pupils are equal, round, and reactive to light.  Extraocular motions are normal.     CN V   Facial sensation intact.     CN VII   Facial expression full, symmetric.     CN VIII   CN VIII normal.     CN IX, X   CN IX normal.   CN X normal.     CN XI   CN XI normal.   Right sternocleidomastoid strength: normal  Left sternocleidomastoid strength: normal  Right trapezius strength: weak  Left trapezius strength: normal    CN XII   CN XII normal.        R. Shoulder does not raise as high     MOTOR EXAM     Strength   Strength 5/5 throughout.     REFLEXES     Reflexes   Right brachioradialis: 3+  Left brachioradialis: 3+  Right biceps: 3+  Left biceps: 3+  Right triceps: 3+  Left triceps: 3+  Right patellar: 3+  Left patellar: 3+ (3++)  Right achilles: 3+  Left achilles: 3+  Right plantar: normal  Left plantar: normal  Right Crooks: absent  Left Crooks: absent  Right ankle clonus: present (1-2 beats)  Left ankle clonus: present (1-2 beats)       L>R hyperreflexia      SENSORY EXAM   Light touch normal.   Vibration normal.     GAIT AND COORDINATION     Gait  Gait: normal     Coordination   Finger to nose coordination: normal  Heel to shin coordination: normal  Tandem walking coordination: normal    Tremor   Resting tremor: absent  Intention tremor: absent  Action tremor: absent    ? ?        Assessment:   Arpan Gamboa is a 56 y.o. male presenting for evaluation of headache and recurrent tinnitus. Discussed with the patient due to the infrequency of his headaches it does not warrant daily medication for prevention. Regarding his " tinnitus he also is regularly exposed to loud noises and also was on regular asa and may be attributed to this.     Plan:     Problem List Items Addressed This Visit     Intractable migraine without status migrainosus    Abnormal neurological exam (Chronic)    Current Assessment & Plan     There are brisk DTRs in the upper and lower extremities, and there is asymmetry especially in the LE (L patellar reflex is more pronounced than the R). There are no parts of the history worrisome for cervical myelopathy. He does apparently have a prior history of cervical spine abnormality on MRI, but he is uncertain of the details (? Radiculopathy). Given his unprovoked headaches history, some of which have been cervicogenic in the past, and the brisk reflexes, cervical spine MRI is warranted.   - Order placed for MRI cervical spine without contrast           Tinnitus - Primary    Relevant Orders    Ambulatory referral/consult to ENT      Other Visit Diagnoses     Cervicalgia        Relevant Orders    MRI Cervical Spine Without Contrast    Abnormal neurological findings        Relevant Orders    MRI Cervical Spine Without Contrast            I spent a total of 35 minutes on the day of the visit. This includes face to face time and non-face to face time preparing to see the patient (eg, review of tests), obtaining and/or reviewing separately obtained history, documenting clinical information in the electronic or other health record, independently interpreting results and communicating results to the patient/family/caregiver, or care coordinator.    Shantel Holman PA-C  Supervising physician Harshad Gates MD   [facility]

## 2022-04-18 NOTE — ASSESSMENT & PLAN NOTE
There are brisk DTRs in the upper and lower extremities, and there is asymmetry especially in the LE (L patellar reflex is more pronounced than the R). There are no parts of the history worrisome for cervical myelopathy. He does apparently have a prior history of cervical spine abnormality on MRI, but he is uncertain of the details (? Radiculopathy). Given his unprovoked headaches history, some of which have been cervicogenic in the past, and the brisk reflexes, cervical spine MRI is warranted.   - Order placed for MRI cervical spine without contrast

## 2022-04-25 ENCOUNTER — ANESTHESIA (OUTPATIENT)
Dept: ENDOSCOPY | Facility: HOSPITAL | Age: 57
End: 2022-04-25
Payer: COMMERCIAL

## 2022-04-25 ENCOUNTER — ANESTHESIA EVENT (OUTPATIENT)
Dept: ENDOSCOPY | Facility: HOSPITAL | Age: 57
End: 2022-04-25
Payer: COMMERCIAL

## 2022-04-25 ENCOUNTER — HOSPITAL ENCOUNTER (OUTPATIENT)
Facility: HOSPITAL | Age: 57
Discharge: HOME | End: 2022-04-25
Attending: INTERNAL MEDICINE | Admitting: INTERNAL MEDICINE
Payer: COMMERCIAL

## 2022-04-25 VITALS
OXYGEN SATURATION: 98 % | TEMPERATURE: 99 F | DIASTOLIC BLOOD PRESSURE: 59 MMHG | RESPIRATION RATE: 20 BRPM | HEART RATE: 60 BPM | SYSTOLIC BLOOD PRESSURE: 111 MMHG

## 2022-04-25 DIAGNOSIS — Z12.11 COLON CANCER SCREENING: ICD-10-CM

## 2022-04-25 PROCEDURE — D9220A PRA ANESTHESIA: ICD-10-PCS | Mod: CRNA,,, | Performed by: STUDENT IN AN ORGANIZED HEALTH CARE EDUCATION/TRAINING PROGRAM

## 2022-04-25 PROCEDURE — D9220A PRA ANESTHESIA: Mod: ANES,,, | Performed by: ANESTHESIOLOGY

## 2022-04-25 PROCEDURE — D9220A PRA ANESTHESIA: ICD-10-PCS | Mod: ANES,,, | Performed by: ANESTHESIOLOGY

## 2022-04-25 PROCEDURE — G0121 COLON CA SCRN NOT HI RSK IND: HCPCS | Performed by: INTERNAL MEDICINE

## 2022-04-25 PROCEDURE — G0121 COLON CA SCRN NOT HI RSK IND: ICD-10-PCS | Mod: ,,, | Performed by: INTERNAL MEDICINE

## 2022-04-25 PROCEDURE — G0121 COLON CA SCRN NOT HI RSK IND: HCPCS | Mod: ,,, | Performed by: INTERNAL MEDICINE

## 2022-04-25 PROCEDURE — D9220A PRA ANESTHESIA: Mod: CRNA,,, | Performed by: STUDENT IN AN ORGANIZED HEALTH CARE EDUCATION/TRAINING PROGRAM

## 2022-04-25 PROCEDURE — 37000008 HC ANESTHESIA 1ST 15 MINUTES: Performed by: INTERNAL MEDICINE

## 2022-04-25 PROCEDURE — 25000003 PHARM REV CODE 250: Performed by: STUDENT IN AN ORGANIZED HEALTH CARE EDUCATION/TRAINING PROGRAM

## 2022-04-25 PROCEDURE — 63600175 PHARM REV CODE 636 W HCPCS: Performed by: STUDENT IN AN ORGANIZED HEALTH CARE EDUCATION/TRAINING PROGRAM

## 2022-04-25 PROCEDURE — 37000009 HC ANESTHESIA EA ADD 15 MINS: Performed by: INTERNAL MEDICINE

## 2022-04-25 RX ORDER — LIDOCAINE HYDROCHLORIDE 20 MG/ML
INJECTION INTRAVENOUS
Status: DISCONTINUED | OUTPATIENT
Start: 2022-04-25 | End: 2022-04-25

## 2022-04-25 RX ORDER — PROPOFOL 10 MG/ML
VIAL (ML) INTRAVENOUS
Status: DISCONTINUED | OUTPATIENT
Start: 2022-04-25 | End: 2022-04-25

## 2022-04-25 RX ORDER — PROPOFOL 10 MG/ML
INJECTION, EMULSION INTRAVENOUS
Status: DISCONTINUED
Start: 2022-04-25 | End: 2022-04-25 | Stop reason: HOSPADM

## 2022-04-25 RX ORDER — LIDOCAINE HYDROCHLORIDE 20 MG/ML
INJECTION, SOLUTION EPIDURAL; INFILTRATION; INTRACAUDAL; PERINEURAL
Status: DISCONTINUED
Start: 2022-04-25 | End: 2022-04-25 | Stop reason: HOSPADM

## 2022-04-25 RX ORDER — SODIUM CHLORIDE 9 MG/ML
INJECTION, SOLUTION INTRAVENOUS CONTINUOUS
Status: CANCELLED | OUTPATIENT
Start: 2022-04-25

## 2022-04-25 RX ORDER — SODIUM CITRATE AND CITRIC ACID MONOHYDRATE 334; 500 MG/5ML; MG/5ML
SOLUTION ORAL
Status: DISCONTINUED | OUTPATIENT
Start: 2022-04-25 | End: 2022-04-25

## 2022-04-25 RX ADMIN — PROPOFOL 20 MG: 10 INJECTION, EMULSION INTRAVENOUS at 01:04

## 2022-04-25 RX ADMIN — SODIUM CITRATE AND CITRIC ACID MONOHYDRATE 30 ML: 500; 334 SOLUTION ORAL at 01:04

## 2022-04-25 RX ADMIN — LIDOCAINE HYDROCHLORIDE 80 MG: 20 INJECTION, SOLUTION INTRAVENOUS at 01:04

## 2022-04-25 RX ADMIN — PROPOFOL 30 MG: 10 INJECTION, EMULSION INTRAVENOUS at 01:04

## 2022-04-25 RX ADMIN — PROPOFOL 40 MG: 10 INJECTION, EMULSION INTRAVENOUS at 01:04

## 2022-04-25 RX ADMIN — SODIUM CHLORIDE: 0.9 INJECTION, SOLUTION INTRAVENOUS at 01:04

## 2022-04-25 RX ADMIN — PROPOFOL 80 MG: 10 INJECTION, EMULSION INTRAVENOUS at 01:04

## 2022-04-25 NOTE — ANESTHESIA PREPROCEDURE EVALUATION
04/25/2022  Arpan Gamboa is a 56 y.o., male.      Pre-op Assessment     I have reviewed the Nursing Notes.       Review of Systems  Anesthesia Hx:  No problems with previous Anesthesia    Social:  Non-Smoker    Cardiovascular:  Cardiovascular Normal Exercise tolerance: good  W-P-W, s/p ablation   Pulmonary:   Denies Pneumonia Asthma Denies Recent URI. Sleep Apnea    Renal/:   Denies Chronic Renal Disease. BPH    Hepatic/GI:   Bowel Prep. Denies PUD. GERD Denies Liver Disease. Denies Hepatitis.    Neurological:   Denies CVA. Headaches Denies Seizures.    Endocrine:  Endocrine Normal        Physical Exam  General: Well nourished, Cooperative, Alert and Oriented    Airway:  Mallampati: III   Mouth Opening: Normal  TM Distance: Normal  Tongue: Normal  Neck ROM: Normal ROM    Dental:  Intact        Anesthesia Plan  Type of Anesthesia, risks & benefits discussed:    Anesthesia Type: Gen Natural Airway  Intra-op Monitoring Plan: Standard ASA Monitors  Induction:  IV  Informed Consent: Informed consent signed with the Patient and all parties understand the risks and agree with anesthesia plan.  All questions answered.   ASA Score: 2  Day of Surgery Review of History & Physical: H&P Update referred to the surgeon/provider.    Ready For Surgery From Anesthesia Perspective.     .

## 2022-04-25 NOTE — PROVATION PATIENT INSTRUCTIONS
Discharge Summary/Instructions after an Endoscopic Procedure  Patient Name: Arpan Gamboa  Patient MRN: [id number]  Patient YOB: 1965 Monday, April 25, 2022  Casimiro Tyson MD  Dear patient,  As a result of recent federal legislation (The Federal Cures Act), you may   receive lab or pathology results from your procedure in your [facility]   account before your physician is able to contact you. Your physician or   their representative will relay the results to you with their   recommendations at their soonest availability.  Thank you,  RESTRICTIONS:  During your procedure today, you received medications for sedation.  These   medications may affect your judgment, balance and coordination.  Therefore,   for 24 hours, you have the following restrictions:   - DO NOT drive a car, operate machinery, make legal/financial decisions,   sign important papers or drink alcohol.    ACTIVITY:  Today: no heavy lifting, straining or running due to procedural   sedation/anesthesia.  The following day: return to full activity including work.  DIET:  Eat and drink normally unless instructed otherwise.     TREATMENT FOR COMMON SIDE EFFECTS:  - Mild abdominal pain, nausea, belching, bloating or excessive gas:  rest,   eat lightly and use a heating pad.  - Sore Throat: treat with throat lozenges and/or gargle with warm salt   water.  - Because air was used during the procedure, expelling large amounts of air   from your rectum or belching is normal.  - If a bowel prep was taken, you may not have a bowel movement for 1-3 days.    This is normal.  SYMPTOMS TO WATCH FOR AND REPORT TO YOUR PHYSICIAN:  1. Abdominal pain or bloating, other than gas cramps.  2. Chest pain.  3. Back pain.  4. Signs of infection such as: chills or fever occurring within 24 hours   after the procedure.  5. Rectal bleeding, which would show as bright red, maroon, or black stools.   (A tablespoon of blood from the rectum is not serious, especially if    hemorrhoids are present.)  6. Vomiting.  7. Weakness or dizziness.  GO DIRECTLY TO THE NEAREST EMERGENCY ROOM IF YOU HAVE ANY OF THE FOLLOWING:      Difficulty breathing              Chills and/or fever over 101 F   Persistent vomiting and/or vomiting blood   Severe abdominal pain   Severe chest pain   Black, tarry stools   Bleeding- more than one tablespoon   Any other symptom or condition that you feel may need urgent attention  Your doctor recommends these additional instructions:  If any biopsies were taken, your doctors clinic will contact you in 1 to 2   weeks with any results.  - Patient has a contact number available for emergencies.  The signs and   symptoms of potential delayed complications were discussed with the   patient.  Return to normal activities tomorrow.  Written discharge   instructions were provided to the patient.   - Discharge patient to home (ambulatory).   - Resume previous diet.   - Continue present medications.   - Repeat colonoscopy in 10 years for screening purposes.  For questions, problems or results please call your physician - Casimiro Tyson MD at Work:  [phone].  [facility] can be reached at [phone]     IF A COMPLICATION OR EMERGENCY SITUATION ARISES AND YOU ARE UNABLE TO REACH   YOUR PHYSICIAN - GO DIRECTLY TO THE EMERGENCY ROOM.  Casimiro Tyson MD  4/25/2022 1:57:10 PM  This report has been verified and signed electronically.  Dear patient,  As a result of recent federal legislation (The Federal Cures Act), you may   receive lab or pathology results from your procedure in your [facility]   account before your physician is able to contact you. Your physician or   their representative will relay the results to you with their   recommendations at their soonest availability.  Thank you,  PROVATION

## 2022-04-25 NOTE — TRANSFER OF CARE
Anesthesia Transfer of Care Note    Patient: Arpan Gamboa    Procedure(s) Performed: Procedure(s) (LRB):  COLONOSCOPY (N/A)    Patient location: GI    Anesthesia Type: general    Transport from OR: Transported from OR on room air with adequate spontaneous ventilation    Post pain: adequate analgesia    Post assessment: no apparent anesthetic complications and tolerated procedure well    Post vital signs: stable    Level of consciousness: awake and alert    Nausea/Vomiting: no nausea/vomiting    Complications: none    Transfer of care protocol was followed      Last vitals:   Visit Vitals  BP (!) 101/58 (Patient Position: Lying)   Pulse 75   Temp 37 °C (98.6 °F) (Oral)   Resp 18   SpO2 98%

## 2022-04-25 NOTE — H&P
[facility]  Gastroenterology  H&P    Patient Name: Arpan Gamboa  MRN: [id number]  Admission Date: 4/25/2022  Code Status: No Order    Attending Provider: divya green  Primary Care Physician: Angeles Johnson MD  Principal Problem:<principal problem not specified>    Subjective:     History of Present Illness: colon cancer screen    Past Medical History:   Diagnosis Date    Allergy     Asthma     Sleep apnea     Tachycardia     WPW       Past Surgical History:   Procedure Laterality Date    CARDIAC ELECTROPHYSIOLOGY [facility]BLATION  [facility]    HERNIA REPAIR  approx. in 1969    hernia repair at 3 yrs old         Review of patient's allergies indicates:   Allergen Reactions    Allergenic extracts Shortness Of Breath     - allergy barriers for mattress & pillows 09/16/2020    Cat hair standardized allergenic extract Shortness Of Breath     - allergy barriers for mattress & pillows 09/16/2020    Dog hair standardized allergenic extract      - allergy barriers for mattress & pillows 09/16/2020     Family History     Problem Relation (Age of Onset)    Asthma Sister    COPD Father    Diabetes Mother    Early death Sister    Heart attack Other    Hyperlipidemia Mother    Hypertension Mother, Sister    Lupus Sister    Prostatitis Father        Tobacco Use    Smoking status: Passive Smoke Exposure - Never Smoker    Smokeless tobacco: Never Used   Substance and Sexual Activity    Alcohol use: Not Currently     Comment: Less than once per week 1 glass of wine    Drug use: Never    Sexual activity: Yes     Partners: Female     Birth control/protection: Abstinence, Coitus interruptus, None     Review of Systems   Respiratory: Negative.    Cardiovascular: Negative.      Objective:     Vital Signs (Most Recent):    Vital Signs (24h Range):           There is no height or weight on file to calculate BMI.    No intake or output data in the 24 hours ending 04/25/22  1325    Lines/Drains/Airways     None                 Physical Exam  Cardiovascular:      Rate and Rhythm: Normal rate and regular rhythm.   Pulmonary:      Effort: Pulmonary effort is normal.      Breath sounds: Normal breath sounds.         Significant Labs:      Significant Imaging:      Assessment/Plan:     There are no hospital problems to display for this patient.      Indication for procedure:    ASA:II  Airway normal  Malampati class:    Personal and family history negative for anesthesia problems    Plan:colon  Anesthesia plan: general      Casimiro Tyson MD  Gastroenterology  [facility]

## 2022-04-29 NOTE — ANESTHESIA POSTPROCEDURE EVALUATION
Anesthesia Post Evaluation    Patient: Arpan Gamboa    Procedure(s) Performed: Procedure(s) (LRB):  COLONOSCOPY (N/A)    Final Anesthesia Type: general      Patient location during evaluation: GI PACU  Patient participation: Yes- Able to Participate  Level of consciousness: awake and alert  Post-procedure vital signs: reviewed and stable  Pain management: adequate  Airway patency: patent    PONV status at discharge: No PONV  Anesthetic complications: no      Cardiovascular status: blood pressure returned to baseline and hemodynamically stable  Respiratory status: unassisted and spontaneous ventilation  Hydration status: euvolemic  Follow-up not needed.          Vitals Value Taken Time   /59 04/25/22 1430   Temp 37 °C (98.6 °F) 04/25/22 1354   Pulse 60 04/25/22 1430   Resp 20 04/25/22 1430   SpO2 98 % 04/25/22 1430         Event Time   Out of Recovery 14:44:00         Pain/Vane Score: No data recorded       Reviewed record in preparation for visit and have obtained necessary documentation. Identified pt with two pt identifiers(name and ).     Chief Complaint   Patient presents with    Other     follow up - Stasis dermatitis of both legs        Coordination of Care Questionnaire:  :     1) Have you been to an emergency room, urgent care clinic since your last visit?   no  Hospitalized since your last visit? no        2) Have you seen or consulted any other health care providers outside of [address] [place] [address] since your last visit? no

## 2022-06-29 ENCOUNTER — PATIENT MESSAGE (OUTPATIENT)
Dept: GASTROENTEROLOGY | Facility: CLINIC | Age: 57
End: 2022-06-29
Payer: COMMERCIAL

## 2022-07-10 DIAGNOSIS — J45.20 MILD INTERMITTENT ASTHMA WITHOUT COMPLICATION: ICD-10-CM

## 2022-07-10 RX ORDER — ALBUTEROL SULFATE 90 UG/1
2 AEROSOL, METERED RESPIRATORY (INHALATION) DAILY PRN
Qty: 18 G | Refills: 5 | Status: CANCELLED | OUTPATIENT
Start: 2022-07-10 | End: 2023-07-10

## 2022-07-10 NOTE — TELEPHONE ENCOUNTER
No new care gaps identified.  [facility] Embedded Care Gaps. Reference number: [id number]. 7/10/2022   3:24:56 PM CDT

## 2022-07-11 ENCOUNTER — OFFICE VISIT (OUTPATIENT)
Dept: URGENT CARE | Facility: CLINIC | Age: 57
End: 2022-07-11
Payer: COMMERCIAL

## 2022-07-11 VITALS
RESPIRATION RATE: 20 BRPM | OXYGEN SATURATION: 98 % | HEART RATE: 85 BPM | WEIGHT: 180 LBS | DIASTOLIC BLOOD PRESSURE: 72 MMHG | SYSTOLIC BLOOD PRESSURE: 121 MMHG | BODY MASS INDEX: 25.2 KG/M2 | HEIGHT: 71 IN | TEMPERATURE: 99 F

## 2022-07-11 DIAGNOSIS — J45.20 MILD INTERMITTENT ASTHMA WITHOUT COMPLICATION: ICD-10-CM

## 2022-07-11 DIAGNOSIS — Z11.9 ENCOUNTER FOR SCREENING EXAMINATION FOR INFECTIOUS DISEASE: Primary | ICD-10-CM

## 2022-07-11 DIAGNOSIS — Z20.822 CLOSE EXPOSURE TO COVID-19 VIRUS: ICD-10-CM

## 2022-07-11 LAB
CTP QC/QA: YES
SARS-COV-2 RDRP RESP QL NAA+PROBE: NEGATIVE

## 2022-07-11 PROCEDURE — 3074F SYST BP LT 130 MM HG: CPT | Mod: CPTII,S$GLB,, | Performed by: FAMILY MEDICINE

## 2022-07-11 PROCEDURE — U0002 COVID-19 LAB TEST NON-CDC: HCPCS | Mod: QW,S$GLB,, | Performed by: FAMILY MEDICINE

## 2022-07-11 PROCEDURE — 3074F PR MOST RECENT SYSTOLIC BLOOD PRESSURE < 130 MM HG: ICD-10-PCS | Mod: CPTII,S$GLB,, | Performed by: FAMILY MEDICINE

## 2022-07-11 PROCEDURE — 1160F PR REVIEW ALL MEDS BY PRESCRIBER/CLIN PHARMACIST DOCUMENTED: ICD-10-PCS | Mod: CPTII,S$GLB,, | Performed by: FAMILY MEDICINE

## 2022-07-11 PROCEDURE — 3008F PR BODY MASS INDEX (BMI) DOCUMENTED: ICD-10-PCS | Mod: CPTII,S$GLB,, | Performed by: FAMILY MEDICINE

## 2022-07-11 PROCEDURE — U0005 INFEC AGEN DETEC AMPLI PROBE: HCPCS | Performed by: FAMILY MEDICINE

## 2022-07-11 PROCEDURE — 3078F PR MOST RECENT DIASTOLIC BLOOD PRESSURE < 80 MM HG: ICD-10-PCS | Mod: CPTII,S$GLB,, | Performed by: FAMILY MEDICINE

## 2022-07-11 PROCEDURE — 99213 OFFICE O/P EST LOW 20 MIN: CPT | Mod: S$GLB,,, | Performed by: FAMILY MEDICINE

## 2022-07-11 PROCEDURE — 1159F MED LIST DOCD IN RCRD: CPT | Mod: CPTII,S$GLB,, | Performed by: FAMILY MEDICINE

## 2022-07-11 PROCEDURE — 3008F BODY MASS INDEX DOCD: CPT | Mod: CPTII,S$GLB,, | Performed by: FAMILY MEDICINE

## 2022-07-11 PROCEDURE — 3078F DIAST BP <80 MM HG: CPT | Mod: CPTII,S$GLB,, | Performed by: FAMILY MEDICINE

## 2022-07-11 PROCEDURE — U0003 INFECTIOUS AGENT DETECTION BY NUCLEIC ACID (DNA OR RNA); SEVERE ACUTE RESPIRATORY SYNDROME CORONAVIRUS 2 (SARS-COV-2) (CORONAVIRUS DISEASE [COVID-19]), AMPLIFIED PROBE TECHNIQUE, MAKING USE OF HIGH THROUGHPUT TECHNOLOGIES AS DESCRIBED BY CMS-2020-01-R: HCPCS | Performed by: FAMILY MEDICINE

## 2022-07-11 PROCEDURE — 1159F PR MEDICATION LIST DOCUMENTED IN MEDICAL RECORD: ICD-10-PCS | Mod: CPTII,S$GLB,, | Performed by: FAMILY MEDICINE

## 2022-07-11 PROCEDURE — U0002: ICD-10-PCS | Mod: QW,S$GLB,, | Performed by: FAMILY MEDICINE

## 2022-07-11 PROCEDURE — 99213 PR OFFICE/OUTPT VISIT, EST, LEVL III, 20-29 MIN: ICD-10-PCS | Mod: S$GLB,,, | Performed by: FAMILY MEDICINE

## 2022-07-11 PROCEDURE — 1160F RVW MEDS BY RX/DR IN RCRD: CPT | Mod: CPTII,S$GLB,, | Performed by: FAMILY MEDICINE

## 2022-07-11 RX ORDER — BECLOMETHASONE DIPROPIONATE HFA 80 UG/1
AEROSOL, METERED RESPIRATORY (INHALATION)
Qty: 10.6 G | Refills: 1 | Status: SHIPPED | OUTPATIENT
Start: 2022-07-11 | End: 2022-07-15 | Stop reason: ALTCHOICE

## 2022-07-11 RX ORDER — ALBUTEROL SULFATE 90 UG/1
2 AEROSOL, METERED RESPIRATORY (INHALATION) DAILY PRN
Qty: 18 G | Refills: 1 | Status: SHIPPED | OUTPATIENT
Start: 2022-07-11 | End: 2023-07-11

## 2022-07-11 RX ORDER — BECLOMETHASONE DIPROPIONATE HFA 80 UG/1
1 AEROSOL, METERED RESPIRATORY (INHALATION) 2 TIMES DAILY
Qty: 10.6 G | Refills: 1 | Status: SHIPPED | OUTPATIENT
Start: 2022-07-11 | End: 2022-07-11

## 2022-07-11 NOTE — PROGRESS NOTES
"Subjective:       Patient ID: Arpan Gamboa is a 56 y.o. male.    Vitals:  height is 5' 11" (1.803 m) and weight is 81.6 kg (180 lb). His temperature is 98.6 °F (37 °C). His blood pressure is 121/72 and his pulse is 85. His respiration is 20 and oxygen saturation is 98%.     Chief Complaint: Cough    Pt presents with complaint of cough, sore throat x3-4 days.  Pt states his wife tested positive for covid on Friday and says his symptoms started that Friday and Saturday.  Pt states he has not taken any medications for his symptoms    Cough  This is a new problem. The current episode started in the past 7 days. The problem has been unchanged. The problem occurs every few minutes. The cough is non-productive. Associated symptoms include headaches and a sore throat. Pertinent negatives include no shortness of breath. Nothing aggravates the symptoms. He has tried nothing for the symptoms. The treatment provided no relief.       HENT: Positive for sore throat. Negative for trouble swallowing.    Respiratory: Positive for cough. Negative for shortness of breath.    Neurological: Positive for headaches.       Objective:      Physical Exam   Constitutional: He is oriented to person, place, and time. He appears well-developed. He is cooperative.  Non-toxic appearance. He does not appear ill. No distress.   HENT:   Head: Normocephalic and atraumatic.   Ears:   Right Ear: Hearing, tympanic membrane, external ear and ear canal normal.   Left Ear: Hearing, tympanic membrane, external ear and ear canal normal.   Nose: Congestion present. No mucosal edema, rhinorrhea or nasal deformity. No epistaxis. Right sinus exhibits no maxillary sinus tenderness and no frontal sinus tenderness. Left sinus exhibits no maxillary sinus tenderness and no frontal sinus tenderness.   Mouth/Throat: Uvula is midline and mucous membranes are normal. Mucous membranes are moist. No trismus in the jaw. Normal dentition. No uvula swelling. Posterior " oropharyngeal erythema (mild erythema) present. No oropharyngeal exudate or posterior oropharyngeal edema.   Eyes: Conjunctivae and lids are normal. No scleral icterus.   Neck: Trachea normal and phonation normal. Neck supple. No edema present. No erythema present. No neck rigidity present.   Cardiovascular: Normal rate, regular rhythm, normal heart sounds and normal pulses.   Pulmonary/Chest: Effort normal and breath sounds normal. No respiratory distress. He has no decreased breath sounds. He has no wheezes. He has no rhonchi.         Comments: Delay in expiration    Abdominal: Normal appearance.   Musculoskeletal: Normal range of motion.         General: No deformity. Normal range of motion.   Neurological: He is alert and oriented to person, place, and time. He exhibits normal muscle tone. Coordination normal.   Skin: Skin is warm, dry, intact, not diaphoretic and not pale.   Psychiatric: His speech is normal and behavior is normal. Judgment and thought content normal.   Nursing note and vitals reviewed.      covid risk score  2  Assessment:       1. Encounter for screening examination for infectious disease    2. Mild intermittent asthma without complication    3. Close exposure to COVID-19 virus          Plan:         Encounter for screening examination for infectious disease  -     POCT COVID-19 Rapid Screening    Mild intermittent asthma without complication  -     albuterol (VENTOLIN HFA) 90 mcg/actuation inhaler; Inhale 2 puffs into the lungs daily as needed.  Dispense: 18 g; Refill: 1    Close exposure to COVID-19 virus    Other orders  -     beclomethasone (QVAR REDIHALER) 80 mcg/actuation inhaler; Inhale 1 puff into the lungs 2 (two) times a day.  Dispense: 10.6 g; Refill: 1    Pt or guardian provided educational materials and instructions regarding their visit diagnosis.

## 2022-07-12 LAB — SARS-COV-2 RNA RESP QL NAA+PROBE: NOT DETECTED

## 2022-07-13 ENCOUNTER — TELEPHONE (OUTPATIENT)
Dept: URGENT CARE | Facility: CLINIC | Age: 57
End: 2022-07-13
Payer: COMMERCIAL

## 2022-07-13 NOTE — TELEPHONE ENCOUNTER
No answer. Left message on answering machine for pt to call back for test results. He has viewed his test results on the portal already but left a message that if he has any questions he should call us back

## 2022-07-15 ENCOUNTER — OFFICE VISIT (OUTPATIENT)
Dept: URGENT CARE | Facility: CLINIC | Age: 57
End: 2022-07-15
Payer: COMMERCIAL

## 2022-07-15 VITALS
BODY MASS INDEX: 25.2 KG/M2 | OXYGEN SATURATION: 96 % | HEIGHT: 71 IN | WEIGHT: 180 LBS | TEMPERATURE: 98 F | HEART RATE: 101 BPM | RESPIRATION RATE: 16 BRPM | DIASTOLIC BLOOD PRESSURE: 73 MMHG | SYSTOLIC BLOOD PRESSURE: 127 MMHG

## 2022-07-15 DIAGNOSIS — R06.02 SHORTNESS OF BREATH: Primary | ICD-10-CM

## 2022-07-15 DIAGNOSIS — R35.0 URINE FREQUENCY: ICD-10-CM

## 2022-07-15 DIAGNOSIS — R82.90 URINE ABNORMALITY: ICD-10-CM

## 2022-07-15 DIAGNOSIS — U07.1 COVID-19 VIRUS DETECTED: ICD-10-CM

## 2022-07-15 DIAGNOSIS — Z11.9 SCREENING EXAMINATION FOR UNSPECIFIED INFECTIOUS DISEASE: ICD-10-CM

## 2022-07-15 DIAGNOSIS — U07.1 COVID-19: ICD-10-CM

## 2022-07-15 LAB
BILIRUB UR QL STRIP: NEGATIVE
CTP QC/QA: YES
GLUCOSE UR QL STRIP: NEGATIVE
KETONES UR QL STRIP: NEGATIVE
LEUKOCYTE ESTERASE UR QL STRIP: NEGATIVE
PH, POC UA: 6 (ref 5–8)
POC BLOOD, URINE: NEGATIVE
POC NITRATES, URINE: NEGATIVE
PROT UR QL STRIP: NEGATIVE
SARS-COV-2 RDRP RESP QL NAA+PROBE: POSITIVE
SP GR UR STRIP: 1.01 (ref 1–1.03)
UROBILINOGEN UR STRIP-ACNC: NORMAL (ref 0.3–2.2)

## 2022-07-15 PROCEDURE — 3008F BODY MASS INDEX DOCD: CPT | Mod: CPTII,S$GLB,,

## 2022-07-15 PROCEDURE — 3074F PR MOST RECENT SYSTOLIC BLOOD PRESSURE < 130 MM HG: ICD-10-PCS | Mod: CPTII,S$GLB,,

## 2022-07-15 PROCEDURE — U0002 COVID-19 LAB TEST NON-CDC: HCPCS | Mod: QW,S$GLB,,

## 2022-07-15 PROCEDURE — 71046 XR CHEST PA AND LATERAL: ICD-10-PCS | Mod: S$GLB,,, | Performed by: RADIOLOGY

## 2022-07-15 PROCEDURE — 81003 URINALYSIS AUTO W/O SCOPE: CPT | Mod: QW,S$GLB,,

## 2022-07-15 PROCEDURE — 81003 POCT URINALYSIS, DIPSTICK, AUTOMATED, W/O SCOPE: ICD-10-PCS | Mod: QW,S$GLB,,

## 2022-07-15 PROCEDURE — 71046 X-RAY EXAM CHEST 2 VIEWS: CPT | Mod: S$GLB,,, | Performed by: RADIOLOGY

## 2022-07-15 PROCEDURE — 3008F PR BODY MASS INDEX (BMI) DOCUMENTED: ICD-10-PCS | Mod: CPTII,S$GLB,,

## 2022-07-15 PROCEDURE — 3078F DIAST BP <80 MM HG: CPT | Mod: CPTII,S$GLB,,

## 2022-07-15 PROCEDURE — 87086 URINE CULTURE/COLONY COUNT: CPT

## 2022-07-15 PROCEDURE — 3074F SYST BP LT 130 MM HG: CPT | Mod: CPTII,S$GLB,,

## 2022-07-15 PROCEDURE — U0002: ICD-10-PCS | Mod: QW,S$GLB,,

## 2022-07-15 PROCEDURE — 1160F RVW MEDS BY RX/DR IN RCRD: CPT | Mod: CPTII,S$GLB,,

## 2022-07-15 PROCEDURE — 1159F MED LIST DOCD IN RCRD: CPT | Mod: CPTII,S$GLB,,

## 2022-07-15 PROCEDURE — 99214 PR OFFICE/OUTPT VISIT, EST, LEVL IV, 30-39 MIN: ICD-10-PCS | Mod: S$GLB,,,

## 2022-07-15 PROCEDURE — 1160F PR REVIEW ALL MEDS BY PRESCRIBER/CLIN PHARMACIST DOCUMENTED: ICD-10-PCS | Mod: CPTII,S$GLB,,

## 2022-07-15 PROCEDURE — 1159F PR MEDICATION LIST DOCUMENTED IN MEDICAL RECORD: ICD-10-PCS | Mod: CPTII,S$GLB,,

## 2022-07-15 PROCEDURE — 99214 OFFICE O/P EST MOD 30 MIN: CPT | Mod: S$GLB,,,

## 2022-07-15 PROCEDURE — 3078F PR MOST RECENT DIASTOLIC BLOOD PRESSURE < 80 MM HG: ICD-10-PCS | Mod: CPTII,S$GLB,,

## 2022-07-15 NOTE — PROGRESS NOTES
"Subjective:       Patient ID: Arpan Gamboa is a 56 y.o. male.    Vitals:  height is 5' 11" (1.803 m) and weight is 81.6 kg (180 lb). His temperature is 98.4 °F (36.9 °C). His blood pressure is 127/73 and his pulse is 101. His respiration is 16 and oxygen saturation is 96%.     Chief Complaint: Cough    Patient wife says she tested positive a week ago  And he started having symptoms  Body aches, sore throat, headaches, fever  As high as 102 .0 frequency in urination patient is also complaining of groin area pain on the left side and he is concerns with his heart rate     Provider note starts below:  Patient presents with CC of worsening URI sx (body aches, sore throat, headache, fever (102), sore throat and fatigue). He was here on 7/11/2022 and tested negative for COVID. He has been using his inhaler 1x/day and has felt more shortness of breath, but not currently. He is concerned because his HR at home has been 90s to 100. He has hx of WPW but denies sx feeling similar to this. He also reports a pain in his left groin that he felt today and rates is a 3/10. Comes and goes. He has a hx of hernia and is worried it could be from that. Cannot remember where the hernia was. He also reports urinary frequency since last night. He states he went to the bathroom every 10 minutes, but has been drinking a lot of water. Today his symptoms have improved. Denies nausea, vomiting, abdominal pain, blood in stool, dysuria, penile pain/swelling, testicular pain/swelling. He had a normal BM today around lunch. Last took tylenol at lunch.     Cough  This is a new problem. The current episode started in the past 7 days. The problem has been unchanged. The cough is non-productive. Associated symptoms include chills, a fever, headaches, postnasal drip, rhinorrhea, a sore throat and shortness of breath. Pertinent negatives include no chest pain, ear congestion, ear pain, heartburn, hemoptysis, myalgias, nasal congestion, rash, sweats, " weight loss or wheezing.       Constitution: Positive for chills, fatigue and fever.   HENT: Positive for congestion, postnasal drip and sore throat. Negative for ear pain, sinus pain and sinus pressure.    Cardiovascular: Negative for chest pain.   Respiratory: Positive for cough and shortness of breath. Negative for bloody sputum and wheezing.    Gastrointestinal: Negative for abdominal trauma, abdominal pain, history of abdominal surgery, nausea, vomiting, constipation, diarrhea, bright red blood in stool, dark colored stools, rectal pain and heartburn.   Genitourinary: Positive for frequency. Negative for dysuria, flank pain, hematuria, painful intercourse, genital sore, penile discharge, painful ejaculation, penile pain, penile swelling, scrotal swelling and testicular pain.   Musculoskeletal: Negative for muscle ache.   Skin: Negative for rash.   Neurological: Positive for headaches. Negative for dizziness and light-headedness.       Objective:      Physical Exam   Constitutional: He is oriented to person, place, and time. He appears well-developed. He is cooperative.  Non-toxic appearance. He does not appear ill. No distress.   HENT:   Head: Normocephalic and atraumatic.   Ears:   Right Ear: Hearing, tympanic membrane, external ear and ear canal normal.   Left Ear: Hearing, tympanic membrane, external ear and ear canal normal.   Nose: Nose normal. No mucosal edema, rhinorrhea or nasal deformity. No epistaxis. Right sinus exhibits no maxillary sinus tenderness and no frontal sinus tenderness. Left sinus exhibits no maxillary sinus tenderness and no frontal sinus tenderness.   Mouth/Throat: Uvula is midline and mucous membranes are normal. Mucous membranes are moist. No trismus in the jaw. Normal dentition. No uvula swelling. Posterior oropharyngeal erythema (diffuse) present. No oropharyngeal exudate or posterior oropharyngeal edema.   Eyes: Conjunctivae and lids are normal. No scleral icterus. Extraocular  movement intact   Neck: Trachea normal and phonation normal. Neck supple. No edema present. No erythema present. No neck rigidity present.   Cardiovascular: Normal rate, regular rhythm, normal heart sounds and normal pulses.   Pulmonary/Chest: Effort normal and breath sounds normal. No respiratory distress. He has no decreased breath sounds. He has no rhonchi.         Comments: No audible wheezing. Lungs CTA. No increased work of breathing    Abdominal: Normal appearance and bowel sounds are normal. Soft. There is no abdominal tenderness. No hernia.   Musculoskeletal: Normal range of motion.         General: No deformity. Normal range of motion.   Lymphadenopathy:     He has no cervical adenopathy.   Neurological: He is alert and oriented to person, place, and time. He exhibits normal muscle tone. Coordination normal.   Skin: Skin is warm, dry, intact, not diaphoretic and not pale.   Psychiatric: His speech is normal and behavior is normal. Judgment and thought content normal.   Nursing note and vitals reviewed.        Results for orders placed or performed in visit on 07/15/22   POCT COVID-19 Rapid Screening   Result Value Ref Range    POC Rapid COVID Positive (A) Negative     Acceptable Yes    POCT Urinalysis, Dipstick, Automated, W/O Scope   Result Value Ref Range    POC Blood, Urine Negative Negative    POC Bilirubin, Urine Negative Negative    POC Urobilinogen, Urine normal 0.3 - 2.2    POC Ketones, Urine Negative Negative    POC Protein, Urine Negative Negative    POC Nitrates, Urine Negative Negative    POC Glucose, Urine Negative Negative    pH, UA 6.0 5 - 8    POC Specific Gravity, Urine 1.010 1.003 - 1.029    POC Leukocytes, Urine Negative Negative       Assessment:       1. Shortness of breath    2. COVID-19    3. Urine frequency    4. Screening examination for unspecified infectious disease    5. Urine abnormality          Plan:     labs reviewed. Advised patient to follow up with  specialist regarding urinary frequency and groin pain. No acute findings on exam to indicate incarcerated hernia, testicular torsion. No sign of infection found in UA. Likely due to hx of BPH/urinary frequency. No acute resp distress on exam. Imaging reviewed. Normal. Advised to continue inhaler more regularly and sent in maintenance inhaler. Not eligible for paxlovid due to sx duration of 7 days. ED precautions reviewed. All questions answered. Patient discharged in NAD.     Shortness of breath  -     XR CHEST PA AND LATERAL; Future; Expected date: 07/15/2022  -     budesonide (PULMICORT FLEXHALER) 90 mcg/actuation AePB; Inhale 1 puff (90 mcg total) into the lungs every 12 (twelve) hours. Controller for 10 days  Dispense: 1 each; Refill: 0    COVID-19    Urine frequency  -     CULTURE, URINE    Screening examination for unspecified infectious disease  -     Cancel: POCT COVID-19 Rapid Screening  -     POCT COVID-19 Rapid Screening  -     POCT Urinalysis, Dipstick, Automated, W/O Scope    Urine abnormality  -     POCT Urinalysis, Dipstick, Automated, W/O Scope  -     CULTURE, URINE      Patient Instructions   PLEASE READ ALL DISCHARGE INSTRUCTIONS    You will be called with results of urine culture    You have tested positive for COVID-19 today.      ISOLATION  If you tested positive and do not have symptoms, you must isolate for 5 days starting on the day of the positive test. I    If you tested positive and have symptoms, you must isolate for 5 days starting on the day of the first symptoms,  not the day of the positive test.     This is the most important part, both the CDC and the [facility] emphasize that you do not test out of isolation.     After 5 days, if your symptoms have improved and you have not had fever on day 5, you can return to the community on day 6- NO TESTING REQUIRED!      In fact, we do not retest if you were positive in the last 90 days.    After your 5 days of isolation are completed, the CDC  recommends strict mask use for the first 5 days that you come out of isolation.     - Rest.    - Drink plenty of fluids.  - Use inhaler as prescribed for maintenance  - Use albuterol inhaler every 4-6 hrs for wheezing/shortness of breath     - You can take over-the-counter claritin, zyrtec, allegra, or xyzal as directed. These are antihistamines that can help with runny nose, nasal congestion, sneezing, and helps to dry up post-nasal drip, which usually causes sore throat and cough.               - If you do NOT have high blood pressure, you may use a decongestant form (D)  of this medication (ie. Claritin- D, zyrtec-D, allegra-D) or if you do not take the D form, you can take sudafed (pseudoephedrine) over the counter, which is a decongestant. Do NOT take two decongestant (D) medications at the same time (such as mucinex-D and claritin-D or plain sudafed and claritin D). Dextromethorphan (DM) is a cough suppressant over the counter (ie. mucinex DM, robitussin, delsym; dayquil/nyquil has DM as well.)    -If you DO have high blood pressure, you may take Coricidin HBP for sinus congestion and Delsym for cough.      - You can use Flonase (fluticasone) nasal spray as directed for sinus congestion and postnasal drip. This is a steroid nasal spray that works locally over time to decrease the inflammation in your nose/sinuses and help with allergic symptoms. This is not an quick- relief spray like afrin, but it works well if used daily.  Discontinue if you develop nose bleed  - Use nasal saline prior to Flonase.  - Use Ocean Spray Nasal Saline 1-3 puffs each nostril every 2-3 hours then blow out onto tissue. This is to irrigate the nasal passage way to clear the sinus openings. Use until sinus problem resolved.     - You can take plain Mucinex (guaifenesin) 1200 mg twice a day to help loosen mucous.      - Warm salt water gargles can help with sore throat     - Warm tea with honey can help with cough. Honey is a natural  cough suppressant.    - Acetaminophen (tylenol) or Ibuprofen (advil,motrin) as directed as needed for fever/pain. Avoid tylenol if you have a history of liver disease. Do not take ibuprofen if you have a history of GI bleeding, kidney disease, or if you take blood thinners.     For groin pain and urinary frequency:  -Follow up with GI doctor and Urologist for persistent sx    - You must understand that you have received an Urgent Care treatment only and that you may be released before all of your medical problems are known or treated.   - You, the patient, will arrange for follow up care as instructed.   - If your condition worsens or fails to improve we recommend that you receive another evaluation at the ER immediately or contact your PCP to discuss your concerns or return here.   - Follow up with your PCP or specialty clinic as directed in the next 1-2 weeks if not improved or as needed.  You can call [phone] to schedule an appointment with the appropriate provider.

## 2022-07-15 NOTE — PATIENT INSTRUCTIONS
PLEASE READ ALL DISCHARGE INSTRUCTIONS    You will be called with results of urine culture    You have tested positive for COVID-19 today.      ISOLATION  If you tested positive and do not have symptoms, you must isolate for 5 days starting on the day of the positive test. I    If you tested positive and have symptoms, you must isolate for 5 days starting on the day of the first symptoms,  not the day of the positive test.     This is the most important part, both the CDC and the [facility] emphasize that you do not test out of isolation.     After 5 days, if your symptoms have improved and you have not had fever on day 5, you can return to the community on day 6- NO TESTING REQUIRED!      In fact, we do not retest if you were positive in the last 90 days.    After your 5 days of isolation are completed, the CDC recommends strict mask use for the first 5 days that you come out of isolation.    - Rest.    - Drink plenty of fluids.  - Use inhaler as prescribed for maintenance  - Use albuterol inhaler every 4-6 hrs for wheezing/shortness of breath     - You can take over-the-counter claritin, zyrtec, allegra, or xyzal as directed. These are antihistamines that can help with runny nose, nasal congestion, sneezing, and helps to dry up post-nasal drip, which usually causes sore throat and cough.               - If you do NOT have high blood pressure, you may use a decongestant form (D)  of this medication (ie. Claritin- D, zyrtec-D, allegra-D) or if you do not take the D form, you can take sudafed (pseudoephedrine) over the counter, which is a decongestant. Do NOT take two decongestant (D) medications at the same time (such as mucinex-D and claritin-D or plain sudafed and claritin D). Dextromethorphan (DM) is a cough suppressant over the counter (ie. mucinex DM, robitussin, delsym; dayquil/nyquil has DM as well.)    -If you DO have high blood pressure, you may take Coricidin HBP for sinus congestion and Delsym for cough.      -  You can use Flonase (fluticasone) nasal spray as directed for sinus congestion and postnasal drip. This is a steroid nasal spray that works locally over time to decrease the inflammation in your nose/sinuses and help with allergic symptoms. This is not an quick- relief spray like afrin, but it works well if used daily.  Discontinue if you develop nose bleed  - Use nasal saline prior to Flonase.  - Use Ocean Spray Nasal Saline 1-3 puffs each nostril every 2-3 hours then blow out onto tissue. This is to irrigate the nasal passage way to clear the sinus openings. Use until sinus problem resolved.     - You can take plain Mucinex (guaifenesin) 1200 mg twice a day to help loosen mucous.      - Warm salt water gargles can help with sore throat     - Warm tea with honey can help with cough. Honey is a natural cough suppressant.    - Acetaminophen (tylenol) or Ibuprofen (advil,motrin) as directed as needed for fever/pain. Avoid tylenol if you have a history of liver disease. Do not take ibuprofen if you have a history of GI bleeding, kidney disease, or if you take blood thinners.     For groin pain and urinary frequency:  -Follow up with GI doctor and Urologist for persistent sx    - You must understand that you have received an Urgent Care treatment only and that you may be released before all of your medical problems are known or treated.   - You, the patient, will arrange for follow up care as instructed.   - If your condition worsens or fails to improve we recommend that you receive another evaluation at the ER immediately or contact your PCP to discuss your concerns or return here.   - Follow up with your PCP or specialty clinic as directed in the next 1-2 weeks if not improved or as needed.  You can call [phone] to schedule an appointment with the appropriate provider.

## 2022-07-16 LAB — BACTERIA UR CULT: NO GROWTH

## 2022-07-17 ENCOUNTER — TELEPHONE (OUTPATIENT)
Dept: URGENT CARE | Facility: CLINIC | Age: 57
End: 2022-07-17
Payer: COMMERCIAL

## 2022-07-20 ENCOUNTER — NURSE TRIAGE (OUTPATIENT)
Dept: ADMINISTRATIVE | Facility: CLINIC | Age: 57
End: 2022-07-20
Payer: COMMERCIAL

## 2022-07-20 ENCOUNTER — HOSPITAL ENCOUNTER (EMERGENCY)
Facility: OTHER | Age: 57
Discharge: HOME | End: 2022-07-20
Attending: EMERGENCY MEDICINE
Payer: COMMERCIAL

## 2022-07-20 VITALS
TEMPERATURE: 99 F | HEIGHT: 71 IN | HEART RATE: 81 BPM | SYSTOLIC BLOOD PRESSURE: 130 MMHG | BODY MASS INDEX: 23.8 KG/M2 | OXYGEN SATURATION: 95 % | WEIGHT: 170 LBS | DIASTOLIC BLOOD PRESSURE: 70 MMHG | RESPIRATION RATE: 18 BRPM

## 2022-07-20 DIAGNOSIS — U07.1 LAB TEST POSITIVE FOR DETECTION OF COVID-19 VIRUS: Primary | ICD-10-CM

## 2022-07-20 DIAGNOSIS — R06.02 SOB (SHORTNESS OF BREATH): ICD-10-CM

## 2022-07-20 DIAGNOSIS — K21.9 GASTROESOPHAGEAL REFLUX DISEASE, UNSPECIFIED WHETHER ESOPHAGITIS PRESENT: ICD-10-CM

## 2022-07-20 PROCEDURE — 99284 EMERGENCY DEPT VISIT MOD MDM: CPT | Mod: 25

## 2022-07-20 PROCEDURE — 93010 EKG 12-LEAD: ICD-10-PCS | Mod: ,,, | Performed by: INTERNAL MEDICINE

## 2022-07-20 PROCEDURE — 93005 ELECTROCARDIOGRAM TRACING: CPT

## 2022-07-20 PROCEDURE — 93010 ELECTROCARDIOGRAM REPORT: CPT | Mod: ,,, | Performed by: INTERNAL MEDICINE

## 2022-07-20 NOTE — TELEPHONE ENCOUNTER
[facility] call.   Attempted x2. LVMx2.     Reason for Disposition   No answer.  First attempt to contact caller.  Follow-up call scheduled within 15 minutes.   Message left on unidentified voice mail. Phone number verified.   Second attempt to contact caller AND no contact made. Phone number verified.    Protocols used: NO CONTACT OR DUPLICATE CONTACT CALL-A-OH

## 2022-07-20 NOTE — TELEPHONE ENCOUNTER
"    Reason for Disposition   SEVERE or constant chest pain or pressure  (Exception: Mild central chest pain, present only when coughing.)     Pain is between his shoulder blades, the back side of his chest   MODERATE difficulty breathing (e.g., speaks in phrases, SOB even at rest, pulse 100-120)    Additional Information   Negative: SEVERE difficulty breathing (e.g., struggling for each breath, speaks in single words)   Negative: Difficult to awaken or acting confused (e.g., disoriented, slurred speech)   Negative: Bluish (or gray) lips or face now   Negative: Shock suspected (e.g., cold/pale/clammy skin, too weak to stand, low BP, rapid pulse)   Negative: Sounds like a life-threatening emergency to the triager    Protocols used: CORONAVIRUS (COVID-19) DIAGNOSED OR [id number]      Arpan's wife Vanna called to say his is Covid positive since 07/15, and today he is having SOB, which is worsening for the last two days. Vanna states that he has difficulty speaking and he is not able to get in enough air.  He states he is also having pain, tightness in between his shoulder blades.  Coughs frequently during this call, reports fatigue is worsening.  He has history of asthma, and prior to Covid + he needed his albuterol inhaler only a couple of times a month, at most.  Since diagnosed with Covid, has been using inhaler every 4 hours, and it is not helping him appreciably, per Vanna, who also states "big dark circles under his eyes".  History of WPW, ablation in 2020 at [facility], per Vanna.  His cardiologist here is Dr Garcia.  [facility], recommend ED now for evaluation. Vanna will bring him now.  Message to Angeles Johnson MD, pcp, and Dr Garcia. Please contact caller directly with any additional care advice.    "

## 2022-07-20 NOTE — ED NOTES
Pt to ED with SOB, recent COVID Dx and asthma Hx. No respiratory distress noted. Concerned d/t home meds not working. Will continue to monitor.

## 2022-07-20 NOTE — FIRST PROVIDER EVALUATION
" Emergency Department TeleTriage Encounter Note      CHIEF COMPLAINT    Chief Complaint   Patient presents with    Shortness of Breath    Headache    Back Pain    Cough     Pt tested COVID positive x4 days ago. Pt given inhaler and has been using it without improvement. Hx of asthma. 96% on RA.       VITAL SIGNS   Initial Vitals [07/20/22 1229]   BP Pulse Resp Temp SpO2   130/70 81 18 99 °F (37.2 °C) 95 %      MAP       --            ALLERGIES    Review of patient's allergies indicates:   Allergen Reactions    Allergenic extracts Shortness Of Breath     - allergy barriers for mattress & pillows 09/16/2020    Cat hair standardized allergenic extract Shortness Of Breath     - allergy barriers for mattress & pillows 09/16/2020    Dog hair standardized allergenic extract      - allergy barriers for mattress & pillows 09/16/2020       PROVIDER TRIAGE NOTE  This is a teletriage evaluation of a 56 y.o. male presenting to the ED with c/o progressed SOB over past few days since being Dx Covid. SOB not worse with ambulation, not improved with albuterol. No hypoxemia at home.  Hx "SOB issue over past year- Dr thinks it may be vocal cord issue- feel like my throat is tight".      PE: no facial swelling, clear speech. Non-toxic/well-appearing. No respiratory distress, speaks in full sentences without issue. No active emesis nor cough. Normal eye contact and mentation.     Plan: EKG, CXR, amb pulse ox. Further/augmented workup at discretion of examining provider.     All ED beds are full at present; patient notified of this status.  Patient seen and medically screened by [facility] via teletriage. Orders initiated at triage to expedite care.  Patient is stable and will be placed in an ED bed when available.  Care will be transferred to an alternate provider when patient has been placed in an Exam Room further exam, additional orders, and disposition.         ORDERS  Labs Reviewed - No data to display    ED Orders (720h ago, " onward)    Start Ordered     Status Ordering Provider    07/20/22 1358 07/20/22 1357  EKG 12-lead  Once         Ordered BERKLEY HAWLYE ANGEL    07/20/22 1358 07/20/22 1357  X-Ray Chest AP Portable  1 time imaging         Ordered BERKLEY HAWLEY ANGEL    07/20/22 1358 07/20/22 1357  Ambulate  Once         Ordered BERKLEY HAWLEY            Virtual Visit Note: The provider triage portion of this emergency department evaluation and documentation was performed via [facility], a HIPAA-compliant telemedicine application, in concert with a tele-presenter in the room. A face to face patient evaluation with one of my colleagues will occur once the patient is placed in an emergency department room.      DISCLAIMER: This note was prepared with [employer] voice recognition transcription software. Garbled syntax, mangled pronouns, and other bizarre constructions may be attributed to that software system.

## 2022-07-21 NOTE — ED PROVIDER NOTES
Encounter Date: 2022       History     Chief Complaint   Patient presents with    Shortness of Breath    Headache    Back Pain    Cough     Pt tested COVID positive x4 days ago. Pt given inhaler and has been using it without improvement. Hx of asthma. 96% on RA.     55 y/o male which presents with SOB since being diagnosed with Covid on Friday. Pt denies chest pain or any other symptoms. He states his symptoms for COVID started  before last but he tested negative until last Friday.     The history is provided by the patient.     Review of patient's allergies indicates:   Allergen Reactions    Allergenic extracts Shortness Of Breath     - allergy barriers for mattress & pillows 2020    Cat hair standardized allergenic extract Shortness Of Breath     - allergy barriers for mattress & pillows 2020    Dog hair standardized allergenic extract      - allergy barriers for mattress & pillows 2020     Past Medical History:   Diagnosis Date    Allergy     Asthma     Sleep apnea     Tachycardia     WPW     Past Surgical History:   Procedure Laterality Date    CARDIAC ELECTROPHYSIOLOGY STUDY AND [facility]BLATION  2020    [facility]    COLONOSCOPY N/A 2022    Procedure: COLONOSCOPY;  Surgeon: Casimiro Tyson MD;  Location: [facility];  Service: Endoscopy;  Laterality: N/A;  covid test 22 [employer], prep instr portal -ml    HERNIA REPAIR  approx. in     hernia repair at 3 yrs old       Family History   Problem Relation Age of Onset    Hypertension Mother     Hyperlipidemia Mother     Diabetes Mother     Prostatitis Father     COPD Father     Lupus Sister     Hypertension Sister     Asthma Sister     Heart attack Other     Early death Sister          at 37 years old of Lupus    Melanoma Neg Hx     Heart disease Neg Hx      Social History     Tobacco Use    Smoking status: Passive Smoke Exposure - Never Smoker    Smokeless tobacco: Never Used   Substance  Use Topics    Alcohol use: Not Currently     Comment: Less than once per week 1 glass of wine    Drug use: Never     Review of Systems   Constitutional: Negative for fever.   HENT: Negative for sore throat.    Respiratory: Positive for shortness of breath.    Cardiovascular: Negative for chest pain.   Gastrointestinal: Negative for nausea.   Genitourinary: Negative for dysuria.   Musculoskeletal: Negative for back pain.   Skin: Negative for rash.   Neurological: Negative for weakness.   Hematological: Does not bruise/bleed easily.   All other systems reviewed and are negative.      Physical Exam     Initial Vitals [07/20/22 1229]   BP Pulse Resp Temp SpO2   130/70 81 18 99 °F (37.2 °C) 95 %      MAP       --         Physical Exam    Nursing note and vitals reviewed.  Constitutional: He appears well-developed and well-nourished.   HENT:   Head: Normocephalic and atraumatic.   Eyes: Conjunctivae and EOM are normal. Pupils are equal, round, and reactive to light.   Neck:   Normal range of motion.  Cardiovascular: Normal rate, regular rhythm, normal heart sounds and intact distal pulses. Exam reveals no gallop and no friction rub.    No murmur heard.  Pulmonary/Chest: Breath sounds normal. No respiratory distress. He has no wheezes. He has no rhonchi. He has no rales. He exhibits no tenderness.   Musculoskeletal:         General: No tenderness or edema. Normal range of motion.      Cervical back: Normal range of motion.     Neurological: He is alert and oriented to person, place, and time. He has normal strength. GCS score is 15. GCS eye subscore is 4. GCS verbal subscore is 5. GCS motor subscore is 6.   Skin: Skin is warm. Capillary refill takes less than 2 seconds.         ED Course   Procedures  Labs Reviewed - No data to display       Imaging Results          X-Ray Chest AP Portable (Final result)  Result time 07/20/22 14:32:41    Final result by Louis Wooten MD (07/20/22 14:32:41)                  Impression:      1. No acute cardiopulmonary process.      Electronically signed by: Louis Wooten MD  Date:    07/20/2022  Time:    14:32             Narrative:    EXAMINATION:  XR CHEST AP PORTABLE    CLINICAL HISTORY:  Shortness of breath    TECHNIQUE:  Single frontal view of the chest was performed.    COMPARISON:  07/15/2022    FINDINGS:  The cardiomediastinal silhouette is not enlarged.  There is no pleural effusion.  The trachea is midline.  The lungs are symmetrically expanded bilaterally without evidence of acute parenchymal process. No large focal consolidation seen.  There is no pneumothorax.  The osseous structures are remarkable for degenerative changes..                                 Medications - No data to display  Medical Decision Making:   Initial Assessment:   56-year-old male with presents to the emergency room with concerns of shortness of breath since being diagnosed with COVID on Friday.  COVID test pending along with chest x-ray.  Differential Diagnosis:   Pneumonia, COVID symptoms, hypoxia  Clinical Tests:   Lab Tests: Ordered and Reviewed       <> Summary of Lab: COVID positive  Radiological Study: Reviewed and Ordered  ED Management:  Patient examined and has a normal exam.  He does not have any respiratory distress.  After further discussion with the patient, his shortness of breath has been going on for several months and he has seen a pulmonologist which told him he either had asthma or vocal cord dysfunction.  Patient does not feel like it asthma because his shortness of breath does not improve with the albuterol inhaler.  The patient has been advised to follow-up with his primary care provider for further evaluation of his shortness of breath. Patient given strict return precautions and voiced understanding of all discharge instructions. Pt was stable at discharge.                           Clinical Impression:   Final diagnoses:  [R06.02] SOB (shortness of breath)  [U07.1] Lab  test positive for detection of COVID-19 virus (Primary)  [K21.9] Gastroesophageal reflux disease, unspecified whether esophagitis present          ED Disposition Condition    Discharge Stable        ED Prescriptions     None        Follow-up Information     Follow up With Specialties Details Why Contact Info    Angeles Johnson MD Internal Medicine  call to have GERD medication changed [address]  [address]  [place] [address]  [phone]             Marquita Bonilla, AUGUSTO  07/20/22 2025

## 2022-11-16 NOTE — ED NOTES
"Pt resting comfortably in bed, wife at bedside. Pt states, "I am ready to go home."  " Patient's last office note has been updated. Patient is officially cleared to proceed with proposed left foot surgery. Note will be sent directly to Dr. Jose Costa regarding this.

## 2023-03-17 ENCOUNTER — OFFICE VISIT (OUTPATIENT)
Dept: INFECTIOUS DISEASES | Facility: CLINIC | Age: 58
End: 2023-03-17
Payer: COMMERCIAL

## 2023-03-17 VITALS
TEMPERATURE: 98 F | HEART RATE: 77 BPM | WEIGHT: 177.25 LBS | SYSTOLIC BLOOD PRESSURE: 119 MMHG | DIASTOLIC BLOOD PRESSURE: 70 MMHG | HEIGHT: 71 IN | BODY MASS INDEX: 24.81 KG/M2

## 2023-03-17 DIAGNOSIS — H10.9 CONJUNCTIVITIS, UNSPECIFIED CONJUNCTIVITIS TYPE, UNSPECIFIED LATERALITY: ICD-10-CM

## 2023-03-17 DIAGNOSIS — L25.9 CONTACT DERMATITIS, UNSPECIFIED CONTACT DERMATITIS TYPE, UNSPECIFIED TRIGGER: Primary | ICD-10-CM

## 2023-03-17 PROCEDURE — 3008F PR BODY MASS INDEX (BMI) DOCUMENTED: ICD-10-PCS | Mod: CPTII,S$GLB,, | Performed by: INTERNAL MEDICINE

## 2023-03-17 PROCEDURE — 3078F PR MOST RECENT DIASTOLIC BLOOD PRESSURE < 80 MM HG: ICD-10-PCS | Mod: CPTII,S$GLB,, | Performed by: INTERNAL MEDICINE

## 2023-03-17 PROCEDURE — 3008F BODY MASS INDEX DOCD: CPT | Mod: CPTII,S$GLB,, | Performed by: INTERNAL MEDICINE

## 2023-03-17 PROCEDURE — 99204 PR OFFICE/OUTPT VISIT, NEW, LEVL IV, 45-59 MIN: ICD-10-PCS | Mod: S$GLB,,, | Performed by: INTERNAL MEDICINE

## 2023-03-17 PROCEDURE — 99999 PR PBB SHADOW E&M-EST. PATIENT-LVL III: ICD-10-PCS | Mod: PBBFAC,,, | Performed by: INTERNAL MEDICINE

## 2023-03-17 PROCEDURE — 99204 OFFICE O/P NEW MOD 45 MIN: CPT | Mod: S$GLB,,, | Performed by: INTERNAL MEDICINE

## 2023-03-17 PROCEDURE — 3074F SYST BP LT 130 MM HG: CPT | Mod: CPTII,S$GLB,, | Performed by: INTERNAL MEDICINE

## 2023-03-17 PROCEDURE — 3078F DIAST BP <80 MM HG: CPT | Mod: CPTII,S$GLB,, | Performed by: INTERNAL MEDICINE

## 2023-03-17 PROCEDURE — 1159F PR MEDICATION LIST DOCUMENTED IN MEDICAL RECORD: ICD-10-PCS | Mod: CPTII,S$GLB,, | Performed by: INTERNAL MEDICINE

## 2023-03-17 PROCEDURE — 99999 PR PBB SHADOW E&M-EST. PATIENT-LVL III: CPT | Mod: PBBFAC,,, | Performed by: INTERNAL MEDICINE

## 2023-03-17 PROCEDURE — 3074F PR MOST RECENT SYSTOLIC BLOOD PRESSURE < 130 MM HG: ICD-10-PCS | Mod: CPTII,S$GLB,, | Performed by: INTERNAL MEDICINE

## 2023-03-17 PROCEDURE — 1159F MED LIST DOCD IN RCRD: CPT | Mod: CPTII,S$GLB,, | Performed by: INTERNAL MEDICINE

## 2023-03-17 RX ORDER — CARBOXYMETHYLCELLULOSE SODIUM AND GLYCERIN 5; 9 MG/ML; MG/ML
1 SOLUTION/ DROPS OPHTHALMIC 4 TIMES DAILY
COMMUNITY
Start: 2023-03-08

## 2023-03-17 RX ORDER — VALACYCLOVIR HYDROCHLORIDE 1 G/1
1000 TABLET, FILM COATED ORAL 2 TIMES DAILY
COMMUNITY

## 2023-03-17 RX ORDER — POLYMYXIN B SULFATE AND TRIMETHOPRIM 1; 10000 MG/ML; [USP'U]/ML
1 SOLUTION OPHTHALMIC
COMMUNITY
Start: 2023-02-20

## 2023-03-17 RX ORDER — OFLOXACIN 3 MG/ML
1 SOLUTION/ DROPS OPHTHALMIC 3 TIMES DAILY
COMMUNITY
Start: 2023-02-27

## 2023-03-17 RX ORDER — TOBRAMYCIN AND DEXAMETHASONE 3; 1 MG/ML; MG/ML
1 SUSPENSION/ DROPS OPHTHALMIC 4 TIMES DAILY
COMMUNITY
Start: 2023-03-09

## 2023-03-17 NOTE — PROGRESS NOTES
Subjective:     Patient ID: Arpan Gamboa is a 57 y.o. male    Chief Complaint: LE rash and conjunctivitis    HPI: 57M presents today for evaluation of rash on lower extremities. He endorses developing rash that has been pruritic on both lower legs. His wife has issues w/ recurrent HSV, so he started taking valtrex because he thought the rash may be related to HSV or VZV. Did not notice significant improvement. Rash is not in dermatomal distribution. No pain. Lesions are now resolving. Denies any new soaps, detergents or lotions. Wife has photos of rash when it first occurred. Pt notes he did recently go [place] a walk [place] and the rash occurred afterwards. On review of photos, noted that there were multiple area of rash in linear distribution most consistent w/ poison ivy contact dermatitis. He also notes recent development of conjunctivitis, for which he has seen ophthalmology and been prescribed drops. This is improving.     Immunization History   Administered Date(s) Administered    COVID-19, MRNA, LN-S, PF (Pfizer) (Gray Cap) 06/05/2022    COVID-19, MRNA, LN-S, PF (Pfizer) (Purple Cap) 03/12/2021, 04/03/2021    Influenza - Quadrivalent - PF *Preferred* (6 months and older) 12/13/2019, 10/23/2020, 11/23/2021    Influenza - Trivalent (ADULT) 12/02/2019        Review of Systems   All other systems reviewed and are negative.     Past Medical History:   Diagnosis Date    Allergy     Asthma     Sleep apnea     Tachycardia     WPW     Past Surgical History:   Procedure Laterality Date    CARDIAC ELECTROPHYSIOLOGY STUDY AND ABLATION  01/06/2020    [facility]    COLONOSCOPY N/A 4/25/2022    Procedure: COLONOSCOPY;  Surgeon: Casimiro Tyson MD;  Location: [facility];  Service: Endoscopy;  Laterality: N/A;  covid test 4/22/22 [employer], prep instr portal -ml    HERNIA REPAIR  approx. in 1969    hernia repair at 3 yrs old       Family History   Problem Relation Age of Onset    Hypertension Mother     Hyperlipidemia  Mother     Diabetes Mother     Prostatitis Father     COPD Father     Lupus Sister     Hypertension Sister     Asthma Sister     Heart attack Other     Early death Sister          at 37 years old of Lupus    Melanoma Neg Hx     Heart disease Neg Hx      Social History     Tobacco Use    Smoking status: Passive Smoke Exposure - Never Smoker    Smokeless tobacco: Never   Substance Use Topics    Alcohol use: Not Currently     Comment: Less than once per week 1 glass of wine    Drug use: Never       Objective:     Physical Exam  Constitutional:       General: He is not in acute distress.     Appearance: Normal appearance. He is well-developed. He is not ill-appearing or diaphoretic.   HENT:      Head: Normocephalic and atraumatic.      Right Ear: External ear normal.      Left Ear: External ear normal.      Nose: Nose normal.   Eyes:      General: No scleral icterus.        Right eye: No discharge.         Left eye: No discharge.      Extraocular Movements: Extraocular movements intact.      Conjunctiva/sclera: Conjunctivae normal.   Pulmonary:      Effort: Pulmonary effort is normal. No respiratory distress.      Breath sounds: No stridor.   Skin:     General: Skin is dry.      Coloration: Skin is not jaundiced or pale.      Findings: No erythema.      Comments: Resolving lower extremity papular rash   Neurological:      General: No focal deficit present.      Mental Status: He is alert and oriented to person, place, and time. Mental status is at baseline.   Psychiatric:         Mood and Affect: Mood normal.         Behavior: Behavior normal.         Thought Content: Thought content normal.         Judgment: Judgment normal.       Data:    All data, including recent labs, radiology, and pathology, has been independently reviewed.    Labs:    No results found in the last 24 hours.     Radiology:    No results found in the last 24 hours.     Assessment:    1. Contact dermatitis, unspecified contact dermatitis type,  unspecified trigger  Appearance of rash in photos is more consistent w/ poison ivy contact dermatitis, possibly exposed during his walk in the park recently  Overall improving  Advised patient to stop valtrex      2. Conjunctivitis, unspecified conjunctivitis type, unspecified laterality  Seen by ophtho, prescribed drops and is improving  Do not feel this is associated with rash on legs  If symptoms worsen after completing drops, recommend follow up w/ ophtho for further recommendations.            Follow up as needed    The total time for evaluation and management services performed on 3/17/23 was greater than 45 minutes.     Tammy Gan DO  Infectious Disease

## 2023-04-21 ENCOUNTER — OFFICE VISIT (OUTPATIENT)
Dept: INTERNAL MEDICINE | Facility: CLINIC | Age: 58
End: 2023-04-21
Payer: COMMERCIAL

## 2023-04-21 ENCOUNTER — LAB VISIT (OUTPATIENT)
Dept: LAB | Facility: OTHER | Age: 58
End: 2023-04-21
Attending: INTERNAL MEDICINE
Payer: COMMERCIAL

## 2023-04-21 VITALS
HEART RATE: 87 BPM | WEIGHT: 176.38 LBS | OXYGEN SATURATION: 97 % | HEIGHT: 71 IN | DIASTOLIC BLOOD PRESSURE: 64 MMHG | BODY MASS INDEX: 24.69 KG/M2 | SYSTOLIC BLOOD PRESSURE: 124 MMHG

## 2023-04-21 DIAGNOSIS — E55.9 VITAMIN D DEFICIENCY: ICD-10-CM

## 2023-04-21 DIAGNOSIS — E78.00 ELEVATED CHOLESTEROL: ICD-10-CM

## 2023-04-21 DIAGNOSIS — N32.81 OAB (OVERACTIVE BLADDER): ICD-10-CM

## 2023-04-21 DIAGNOSIS — L21.9 SEBORRHEIC DERMATITIS: ICD-10-CM

## 2023-04-21 DIAGNOSIS — Z00.00 ANNUAL PHYSICAL EXAM: Primary | ICD-10-CM

## 2023-04-21 DIAGNOSIS — Z23 NEED FOR ZOSTER VACCINE: ICD-10-CM

## 2023-04-21 DIAGNOSIS — Z23 NEED FOR PNEUMOCOCCAL VACCINE: ICD-10-CM

## 2023-04-21 DIAGNOSIS — R03.0 ELEVATED BLOOD PRESSURE READING WITHOUT DIAGNOSIS OF HYPERTENSION: ICD-10-CM

## 2023-04-21 DIAGNOSIS — Z00.00 ANNUAL PHYSICAL EXAM: ICD-10-CM

## 2023-04-21 LAB
25(OH)D3+25(OH)D2 SERPL-MCNC: 30 NG/ML (ref 30–96)
ALBUMIN SERPL BCP-MCNC: 4 G/DL (ref 3.5–5.2)
ALP SERPL-CCNC: 70 U/L (ref 55–135)
ALT SERPL W/O P-5'-P-CCNC: 25 U/L (ref 10–44)
ANION GAP SERPL CALC-SCNC: 7 MMOL/L (ref 8–16)
AST SERPL-CCNC: 23 U/L (ref 10–40)
BASOPHILS # BLD AUTO: 0.04 K/UL (ref 0–0.2)
BASOPHILS NFR BLD: 0.8 % (ref 0–1.9)
BILIRUB SERPL-MCNC: 0.6 MG/DL (ref 0.1–1)
BUN SERPL-MCNC: 14 MG/DL (ref 6–20)
CALCIUM SERPL-MCNC: 9.7 MG/DL (ref 8.7–10.5)
CHLORIDE SERPL-SCNC: 108 MMOL/L (ref 95–110)
CHOLEST SERPL-MCNC: 193 MG/DL (ref 120–199)
CHOLEST/HDLC SERPL: 3.9 {RATIO} (ref 2–5)
CO2 SERPL-SCNC: 27 MMOL/L (ref 23–29)
CREAT SERPL-MCNC: 0.8 MG/DL (ref 0.5–1.4)
DIFFERENTIAL METHOD: ABNORMAL
EOSINOPHIL # BLD AUTO: 0.2 K/UL (ref 0–0.5)
EOSINOPHIL NFR BLD: 3.9 % (ref 0–8)
ERYTHROCYTE [DISTWIDTH] IN BLOOD BY AUTOMATED COUNT: 12.1 % (ref 11.5–14.5)
EST. GFR  (NO RACE VARIABLE): >60 ML/MIN/1.73 M^2
ESTIMATED AVG GLUCOSE: 97 MG/DL (ref 68–131)
GLUCOSE SERPL-MCNC: 97 MG/DL (ref 70–110)
HBA1C MFR BLD: 5 % (ref 4–5.6)
HCT VFR BLD AUTO: 45.5 % (ref 40–54)
HDLC SERPL-MCNC: 50 MG/DL (ref 40–75)
HDLC SERPL: 25.9 % (ref 20–50)
HGB BLD-MCNC: 15.2 G/DL (ref 14–18)
IMM GRANULOCYTES # BLD AUTO: 0.01 K/UL (ref 0–0.04)
IMM GRANULOCYTES NFR BLD AUTO: 0.2 % (ref 0–0.5)
LDLC SERPL CALC-MCNC: 123.6 MG/DL (ref 63–159)
LYMPHOCYTES # BLD AUTO: 1.3 K/UL (ref 1–4.8)
LYMPHOCYTES NFR BLD: 26.3 % (ref 18–48)
MCH RBC QN AUTO: 31.4 PG (ref 27–31)
MCHC RBC AUTO-ENTMCNC: 33.4 G/DL (ref 32–36)
MCV RBC AUTO: 94 FL (ref 82–98)
MONOCYTES # BLD AUTO: 0.4 K/UL (ref 0.3–1)
MONOCYTES NFR BLD: 8.1 % (ref 4–15)
NEUTROPHILS # BLD AUTO: 2.9 K/UL (ref 1.8–7.7)
NEUTROPHILS NFR BLD: 60.7 % (ref 38–73)
NONHDLC SERPL-MCNC: 143 MG/DL
NRBC BLD-RTO: 0 /100 WBC
PLATELET # BLD AUTO: 174 K/UL (ref 150–450)
PMV BLD AUTO: 10.6 FL (ref 9.2–12.9)
POTASSIUM SERPL-SCNC: 4 MMOL/L (ref 3.5–5.1)
PROT SERPL-MCNC: 7.2 G/DL (ref 6–8.4)
RBC # BLD AUTO: 4.84 M/UL (ref 4.6–6.2)
SODIUM SERPL-SCNC: 142 MMOL/L (ref 136–145)
TRIGL SERPL-MCNC: 97 MG/DL (ref 30–150)
TSH SERPL DL<=0.005 MIU/L-ACNC: 0.48 UIU/ML (ref 0.4–4)
WBC # BLD AUTO: 4.83 K/UL (ref 3.9–12.7)

## 2023-04-21 PROCEDURE — 99396 PR PREVENTIVE VISIT,EST,40-64: ICD-10-PCS | Mod: S$GLB,,, | Performed by: INTERNAL MEDICINE

## 2023-04-21 PROCEDURE — 3078F PR MOST RECENT DIASTOLIC BLOOD PRESSURE < 80 MM HG: ICD-10-PCS | Mod: CPTII,S$GLB,, | Performed by: INTERNAL MEDICINE

## 2023-04-21 PROCEDURE — 85025 COMPLETE CBC W/AUTO DIFF WBC: CPT | Performed by: INTERNAL MEDICINE

## 2023-04-21 PROCEDURE — 3044F PR MOST RECENT HEMOGLOBIN A1C LEVEL <7.0%: ICD-10-PCS | Mod: CPTII,S$GLB,, | Performed by: INTERNAL MEDICINE

## 2023-04-21 PROCEDURE — 80061 LIPID PANEL: CPT | Performed by: INTERNAL MEDICINE

## 2023-04-21 PROCEDURE — 82306 VITAMIN D 25 HYDROXY: CPT | Performed by: INTERNAL MEDICINE

## 2023-04-21 PROCEDURE — 99999 PR PBB SHADOW E&M-EST. PATIENT-LVL IV: CPT | Mod: PBBFAC,,, | Performed by: INTERNAL MEDICINE

## 2023-04-21 PROCEDURE — 3074F SYST BP LT 130 MM HG: CPT | Mod: CPTII,S$GLB,, | Performed by: INTERNAL MEDICINE

## 2023-04-21 PROCEDURE — 1159F PR MEDICATION LIST DOCUMENTED IN MEDICAL RECORD: ICD-10-PCS | Mod: CPTII,S$GLB,, | Performed by: INTERNAL MEDICINE

## 2023-04-21 PROCEDURE — 1160F RVW MEDS BY RX/DR IN RCRD: CPT | Mod: CPTII,S$GLB,, | Performed by: INTERNAL MEDICINE

## 2023-04-21 PROCEDURE — 99396 PREV VISIT EST AGE 40-64: CPT | Mod: S$GLB,,, | Performed by: INTERNAL MEDICINE

## 2023-04-21 PROCEDURE — 36415 COLL VENOUS BLD VENIPUNCTURE: CPT | Performed by: INTERNAL MEDICINE

## 2023-04-21 PROCEDURE — 84443 ASSAY THYROID STIM HORMONE: CPT | Performed by: INTERNAL MEDICINE

## 2023-04-21 PROCEDURE — 1160F PR REVIEW ALL MEDS BY PRESCRIBER/CLIN PHARMACIST DOCUMENTED: ICD-10-PCS | Mod: CPTII,S$GLB,, | Performed by: INTERNAL MEDICINE

## 2023-04-21 PROCEDURE — 99999 PR PBB SHADOW E&M-EST. PATIENT-LVL IV: ICD-10-PCS | Mod: PBBFAC,,, | Performed by: INTERNAL MEDICINE

## 2023-04-21 PROCEDURE — 3078F DIAST BP <80 MM HG: CPT | Mod: CPTII,S$GLB,, | Performed by: INTERNAL MEDICINE

## 2023-04-21 PROCEDURE — 80053 COMPREHEN METABOLIC PANEL: CPT | Performed by: INTERNAL MEDICINE

## 2023-04-21 PROCEDURE — 3008F PR BODY MASS INDEX (BMI) DOCUMENTED: ICD-10-PCS | Mod: CPTII,S$GLB,, | Performed by: INTERNAL MEDICINE

## 2023-04-21 PROCEDURE — 3074F PR MOST RECENT SYSTOLIC BLOOD PRESSURE < 130 MM HG: ICD-10-PCS | Mod: CPTII,S$GLB,, | Performed by: INTERNAL MEDICINE

## 2023-04-21 PROCEDURE — 3008F BODY MASS INDEX DOCD: CPT | Mod: CPTII,S$GLB,, | Performed by: INTERNAL MEDICINE

## 2023-04-21 PROCEDURE — 3044F HG A1C LEVEL LT 7.0%: CPT | Mod: CPTII,S$GLB,, | Performed by: INTERNAL MEDICINE

## 2023-04-21 PROCEDURE — 83036 HEMOGLOBIN GLYCOSYLATED A1C: CPT | Performed by: INTERNAL MEDICINE

## 2023-04-21 PROCEDURE — 1159F MED LIST DOCD IN RCRD: CPT | Mod: CPTII,S$GLB,, | Performed by: INTERNAL MEDICINE

## 2023-04-21 RX ORDER — OXYBUTYNIN CHLORIDE 5 MG/1
5 TABLET, EXTENDED RELEASE ORAL DAILY
Qty: 30 TABLET | Refills: 11 | Status: SHIPPED | OUTPATIENT
Start: 2023-04-21 | End: 2024-04-20

## 2023-04-21 RX ORDER — KETOCONAZOLE 20 MG/ML
SHAMPOO, SUSPENSION TOPICAL
Qty: 240 ML | Refills: 0 | Status: SHIPPED | OUTPATIENT
Start: 2023-04-21

## 2023-04-21 NOTE — PROGRESS NOTES
Subjective:       Patient ID: Arpan Gamboa is a 57 y.o. male who  has a past medical history of Allergy, Asthma, Sleep apnea, and Tachycardia.    Chief Complaint: Annual Exam and overactive bladder     History was obtained from the patient and supplemented through chart review.  Saw ID for LLE rash c/w contact derm/poison ivy.    Does office work.  Originally from [place] [place] [facility] (has green card here); his family moved to [facility].  His wife is Vanna Gonzales; she is from [place]. They are moving to [place] this summer.    [facility]    Has a form for his insurance.       Went to the dentist and BP was 140 which was unusual for him.  Clinic BPs 119-127/60-70.    BPH, OAB:  Has urgency, frequency. No constipation.  Nocturia 1/night.  No other urinary sx, such as urinary hesitancy, intermittency, weak stream, dysuria, hematuria, incomplete emptying of bladder.  Saw Urology.  Their exam:  Prostate: 45g no nodules; seminal vesicles not palpated  Rectum: normal rectal tone, no rectal mass, normal perineum  Never been on Flomax.   Took Ditropan for OAB x 2 mo with great improvement; was taking this temporarily d/t concern for dependence, med SE.    Uro discussed avoidance of dietary triggers.    Requesting refill of shampoo from Derm.    Elevated cholesterol:    Lab Results   Component Value Date    LDLCALC 123.6 04/21/2023     The 10-year ASCVD risk score (Bryson WILKERSON, et al., 2019) is: 5.6%    Values used to calculate the score:      Age: 57 years      Sex: Male      Is Non- : No      Diabetic: No      Tobacco smoker: No      Systolic Blood Pressure: 124 mmHg      Is BP treated: No      HDL Cholesterol: 50 mg/dL      Total Cholesterol: 193 mg/dL    Vitamin D deficiency:  taking supplement.  Lab Results   Component Value Date    NYGDXDLR27QH 30 04/21/2023    EXSOWSBA86HX 45 06/07/2021    GPTMHCMD04EM 27 (L) 12/20/2019     C scope  normal.  Repeat in 10 years.              Not addressed today.  WPW:  Dx  with WPW at age 16 and treated with propranolol.  Episodes of palpitations, lightheadedness 4 per year, lasting 10-20 minutes, often >200 HR.  No syncope, CP, SOB.  TTE WNL.  Holter in 2017 with many PVCs.  Required adenosine .  S/p ablation on 01/06/2020 [facility].  Stopped Verapamil.  No palpitations, CP, SOB, lightheadedness, dizziness, syncope.      Following with Cardiology at [facility] and [facility].  Recommended following up with EP p.r.n. and cardiology yearly.   Stress TTE 11/2020 w/o ischemia or accelerated conduction.  SOB thought to be due to mild asthma.   Doing well after ablation.  Following with cardiology yearly.    AMANDEEP:    Severe.  Diagnosed in 2017. On CPAP.  Following with sleep clinic    Migraine:  Following with Neurology.  Rx Flexeril p.r.n..  Stress contributing; he declined referral to therapy.  AMANDEEP as above.    AR:  Allergies to dust. Worse in the spring. Takes Claritin PRN. Restarted Flonase daily.  Seeing OSH allergist in [place], Dr. Mora, and ENT at [facility].  Seasonal; worsened lately. Continue antihistamine p.r.n., Flonase regularly.     Mild intermittent Asthma:    History of childhood that required several hospitalizations.  Following with OSH allergist virtually with allergy specialist in [place]. Doing well off Qvar. AR, GERD as below, improved. OSH allergist is concerned for vocal cord dysfunction. Has been coughing after drinking water. Following with ENT.    Mucocele, tonsil stone:  Had laryngoscopy with ENT.  Recommended saltwater gargling.    Tinnitus, SNHL:  Since 6/2021. Continuous b/l tinnitus with slight otalgia b/l.  Mostly occurs the entire day.  No vertigo, decreased hearing.  No fever, d/c.  Has been remotely working from home with many virtual meetings daily and is wearing a headset.   Saw ENT and reassurred. Recheck hearing in 1 year.  Persistent and he is trying to ignore the tinnitus.    GERD:    History of EGD 20 years ago.  Used to be on  "Protonix daily.  Did not have H pylori test done.  Working on dietary changes with improvement of sx.  Following with ENT for vocal cord evaluation.    Chronic R knee pain c Right meniscal tear:  Used to run marathons until chronic knee issues. MRI with meniscal tear.  Following with [facility]. Likes to jog, but hasn't been able to do so d/t chronic R knee pain.   Persistent. Reschedule c Sports Med. Exercise as tolerated.    Review of Systems   Constitutional:  Negative for activity change and appetite change.   Respiratory:  Negative for wheezing.    Cardiovascular:  Negative for leg swelling.   Gastrointestinal:  Negative for constipation.   Genitourinary:  Positive for frequency. Negative for difficulty urinating and dysuria.   Musculoskeletal:  Negative for gait problem.   Skin:  Negative for rash and wound.   Hematological:  Negative for adenopathy.   Psychiatric/Behavioral:  Negative for confusion. The patient is not nervous/anxious.        I personally reviewed Past Medical History, Past Surgical History, Social History, and Family History.    Objective:      Vitals:    04/21/23 0814   BP: 124/64   Pulse: 87   SpO2: 97%   Weight: 80 kg (176 lb 5.9 oz)   Height: 5' 11" (1.803 m)        Physical Exam  Constitutional:       General: He is not in acute distress.     Appearance: He is well-developed. He is not diaphoretic.   HENT:      Head: Normocephalic and atraumatic.      Nose: Nose normal.      Mouth/Throat:      Pharynx: No oropharyngeal exudate.   Eyes:      General: No scleral icterus.        Right eye: No discharge.         Left eye: No discharge.   Neck:      Thyroid: No thyromegaly.      Trachea: No tracheal deviation.   Cardiovascular:      Rate and Rhythm: Normal rate and regular rhythm.      Heart sounds: Normal heart sounds. No murmur heard.  Pulmonary:      Effort: Pulmonary effort is normal. No respiratory distress.      Breath sounds: Normal breath sounds. No wheezing.   Abdominal:      " General: Bowel sounds are normal. There is no distension.      Palpations: Abdomen is soft.      Tenderness: There is no abdominal tenderness.   Musculoskeletal:         General: No deformity.      Cervical back: Neck supple.      Right lower leg: No edema.      Left lower leg: No edema.   Lymphadenopathy:      Cervical: No cervical adenopathy.   Skin:     General: Skin is warm and dry.      Findings: No erythema.   Neurological:      Mental Status: He is alert.      Gait: Gait normal.   Psychiatric:         Behavior: Behavior normal.         Lab Results   Component Value Date    WBC 4.83 04/21/2023    HGB 15.2 04/21/2023    HCT 45.5 04/21/2023     04/21/2023    CHOL 193 04/21/2023    TRIG 97 04/21/2023    HDL 50 04/21/2023    ALT 25 04/21/2023    AST 23 04/21/2023     04/21/2023    K 4.0 04/21/2023     04/21/2023    CREATININE 0.8 04/21/2023    BUN 14 04/21/2023    CO2 27 04/21/2023    TSH 0.485 04/21/2023    PSA 0.37 06/07/2021    HGBA1C 5.0 04/21/2023       The 10-year ASCVD risk score (Bryson WILKERSON, et al., 2019) is: 5.6%    Values used to calculate the score:      Age: 57 years      Sex: Male      Is Non- : No      Diabetic: No      Tobacco smoker: No      Systolic Blood Pressure: 124 mmHg      Is BP treated: No      HDL Cholesterol: 50 mg/dL      Total Cholesterol: 193 mg/dL    (Imaging have been independently reviewed)  CXR without acute abnormality.    Assessment:       1. Annual physical exam    2. Elevated blood pressure reading without diagnosis of hypertension    3. OAB (overactive bladder)    4. Seborrheic dermatitis    5. Elevated cholesterol    6. Vitamin D deficiency    7. Need for zoster vaccine    8. Need for pneumococcal vaccine            Plan:       Arpan was seen today for annual exam and overactive bladder.    Diagnoses and all orders for this visit:    Annual physical exam  Comments:  Pt to complete labs and we can then complete his insurance  form.  Orders:  -     Hemoglobin A1C; Future  -     Comprehensive Metabolic Panel; Future  -     CBC Auto Differential; Future  -     Lipid Panel; Future  -     Vitamin D; Future  -     TSH; Future    Elevated blood pressure reading without diagnosis of hypertension  Comments:  at the dentist's office. Consistently wnl in clinic. Monitor.    OAB (overactive bladder)  Comments:  Persistent. Discussed SE, R/B. Restart Oxybutynin. F/u c Urology.  Orders:  -     oxybutynin (DITROPAN-XL) 5 MG TR24; Take 1 tablet (5 mg total) by mouth once daily.    Seborrheic dermatitis  Comments:  Refilled ketoconazole shampoo.  Orders:  -     ketoconazole (NIZORAL) 2 % shampoo; LATHER SCALP FOR 5-10 MIN, THEN RINSE. USE 2-3 TIMES PER WEEK    Elevated cholesterol  Comments:  FLP improved. ASCVD low.  Not on statin. Monitor FLP.    Vitamin D deficiency  Comments:  Improved.  Taking supplement. Recheck level.  Orders:  -     Vitamin D; Future    Need for zoster vaccine  Comments:  Advised to obtain vaccine at Pharmacy.    Need for pneumococcal vaccine  Comments:  PCV 20 d/t asthma. Advised to obtain vaccine at Pharmacy.         Health Maintenance   Topic Date Due    Lipid Panel  04/21/2028    TETANUS VACCINE  01/01/2029    Hepatitis C Screening  Completed     Side effects of medication(s) were discussed in detail and patient voiced understanding.  Patient will call back for any issues or complications.     RTC PRN. Moving to [place] this summer.

## 2023-05-19 ENCOUNTER — OFFICE VISIT (OUTPATIENT)
Dept: INTERNAL MEDICINE | Facility: CLINIC | Age: 58
End: 2023-05-19
Payer: COMMERCIAL

## 2023-05-19 VITALS — OXYGEN SATURATION: 97 % | SYSTOLIC BLOOD PRESSURE: 120 MMHG | HEART RATE: 80 BPM | DIASTOLIC BLOOD PRESSURE: 70 MMHG

## 2023-05-19 DIAGNOSIS — R05.8 OTHER COUGH: ICD-10-CM

## 2023-05-19 DIAGNOSIS — J06.9 VIRAL URI: Primary | ICD-10-CM

## 2023-05-19 LAB — SARS-COV-2 RNA RESP QL NAA+PROBE: NOT DETECTED

## 2023-05-19 PROCEDURE — 3044F PR MOST RECENT HEMOGLOBIN A1C LEVEL <7.0%: ICD-10-PCS | Mod: CPTII,S$GLB,, | Performed by: INTERNAL MEDICINE

## 2023-05-19 PROCEDURE — 1159F MED LIST DOCD IN RCRD: CPT | Mod: CPTII,S$GLB,, | Performed by: INTERNAL MEDICINE

## 2023-05-19 PROCEDURE — 99999 PR PBB SHADOW E&M-EST. PATIENT-LVL III: CPT | Mod: PBBFAC,,, | Performed by: INTERNAL MEDICINE

## 2023-05-19 PROCEDURE — 3074F SYST BP LT 130 MM HG: CPT | Mod: CPTII,S$GLB,, | Performed by: INTERNAL MEDICINE

## 2023-05-19 PROCEDURE — 3078F PR MOST RECENT DIASTOLIC BLOOD PRESSURE < 80 MM HG: ICD-10-PCS | Mod: CPTII,S$GLB,, | Performed by: INTERNAL MEDICINE

## 2023-05-19 PROCEDURE — 1159F PR MEDICATION LIST DOCUMENTED IN MEDICAL RECORD: ICD-10-PCS | Mod: CPTII,S$GLB,, | Performed by: INTERNAL MEDICINE

## 2023-05-19 PROCEDURE — 1160F PR REVIEW ALL MEDS BY PRESCRIBER/CLIN PHARMACIST DOCUMENTED: ICD-10-PCS | Mod: CPTII,S$GLB,, | Performed by: INTERNAL MEDICINE

## 2023-05-19 PROCEDURE — 87635 SARS-COV-2 COVID-19 AMP PRB: CPT | Performed by: INTERNAL MEDICINE

## 2023-05-19 PROCEDURE — 3074F PR MOST RECENT SYSTOLIC BLOOD PRESSURE < 130 MM HG: ICD-10-PCS | Mod: CPTII,S$GLB,, | Performed by: INTERNAL MEDICINE

## 2023-05-19 PROCEDURE — 1160F RVW MEDS BY RX/DR IN RCRD: CPT | Mod: CPTII,S$GLB,, | Performed by: INTERNAL MEDICINE

## 2023-05-19 PROCEDURE — 3044F HG A1C LEVEL LT 7.0%: CPT | Mod: CPTII,S$GLB,, | Performed by: INTERNAL MEDICINE

## 2023-05-19 PROCEDURE — 99999 PR PBB SHADOW E&M-EST. PATIENT-LVL III: ICD-10-PCS | Mod: PBBFAC,,, | Performed by: INTERNAL MEDICINE

## 2023-05-19 PROCEDURE — 3078F DIAST BP <80 MM HG: CPT | Mod: CPTII,S$GLB,, | Performed by: INTERNAL MEDICINE

## 2023-05-19 PROCEDURE — 99213 PR OFFICE/OUTPT VISIT, EST, LEVL III, 20-29 MIN: ICD-10-PCS | Mod: S$GLB,,, | Performed by: INTERNAL MEDICINE

## 2023-05-19 PROCEDURE — 99213 OFFICE O/P EST LOW 20 MIN: CPT | Mod: S$GLB,,, | Performed by: INTERNAL MEDICINE

## 2023-05-19 RX ORDER — BENZONATATE 200 MG/1
200 CAPSULE ORAL 3 TIMES DAILY PRN
Qty: 30 CAPSULE | Refills: 0 | Status: SHIPPED | OUTPATIENT
Start: 2023-05-19 | End: 2023-06-18

## 2023-05-19 NOTE — PROGRESS NOTES
Subjective     Patient ID: Arpan Gamboa is a 57 y.o. male.    Chief Complaint: Sore Throat, Hoarse (Sore throat x 11 day/Hoarse x 4 days), and Cough    Pt c/o 11 days of nasal congestion with green rhinorrhea. Cough is productive of green sputum; cough persists but not having sputum production. No fever. Sore throat which has improved. Taking antihistamine and PPI which he started 3 days ago. Never tested for covid. Tested neg for strep 1 week ago.   Review of Systems   Constitutional:  Negative for fever.   HENT:  Negative for nasal congestion, rhinorrhea and sore throat.    Respiratory:  Positive for cough. Negative for shortness of breath.    Cardiovascular:  Negative for chest pain.        Objective     Physical Exam  Constitutional:       Appearance: Normal appearance. He is well-developed.   HENT:      Right Ear: Tympanic membrane, ear canal and external ear normal.      Left Ear: Tympanic membrane, ear canal and external ear normal.      Nose: No mucosal edema or rhinorrhea.      Mouth/Throat:      Pharynx: No oropharyngeal exudate or posterior oropharyngeal erythema.   Eyes:      Extraocular Movements: Extraocular movements intact.      Pupils: Pupils are equal, round, and reactive to light.   Neck:      Thyroid: No thyromegaly.   Cardiovascular:      Rate and Rhythm: Normal rate and regular rhythm.      Heart sounds: Normal heart sounds.   Pulmonary:      Effort: Pulmonary effort is normal.      Breath sounds: Normal breath sounds.   Musculoskeletal:      Cervical back: Neck supple.      Right lower leg: No edema.      Left lower leg: No edema.   Lymphadenopathy:      Cervical: No cervical adenopathy.   Neurological:      Mental Status: He is alert and oriented to person, place, and time.   Psychiatric:         Mood and Affect: Mood normal.         Behavior: Behavior normal.        Assessment and Plan     Problem List Items Addressed This Visit    None  Visit Diagnoses       Viral URI    -  Primary     Cough        Relevant Orders    COVID-19 Routine Screening            Doubt covid but will swab; educated on cdc recommendations re: quarantine/isolation, home tx and ED/RTC prompts  Likely non-flu, non-covid viral URI that is resolving but with residual (yet improving) cough; tessalon perles prn  RTC prompts d/w pt

## 2023-08-11 ENCOUNTER — PATIENT MESSAGE (OUTPATIENT)
Dept: RESEARCH | Facility: HOSPITAL | Age: 58
End: 2023-08-11
Payer: COMMERCIAL